# Patient Record
Sex: FEMALE | Race: WHITE | NOT HISPANIC OR LATINO | Employment: UNEMPLOYED | RURAL
[De-identification: names, ages, dates, MRNs, and addresses within clinical notes are randomized per-mention and may not be internally consistent; named-entity substitution may affect disease eponyms.]

---

## 2017-04-18 ENCOUNTER — HISTORICAL (OUTPATIENT)
Dept: ADMINISTRATIVE | Facility: HOSPITAL | Age: 47
End: 2017-04-18

## 2017-04-19 LAB
LAB AP CLINICAL INFORMATION: NORMAL
LAB AP DIAGNOSIS - HISTORICAL: NORMAL
LAB AP GROSS PATHOLOGY - HISTORICAL: NORMAL
LAB AP SPECIMEN SUBMITTED - HISTORICAL: NORMAL

## 2018-12-10 ENCOUNTER — HISTORICAL (OUTPATIENT)
Dept: ADMINISTRATIVE | Facility: HOSPITAL | Age: 48
End: 2018-12-10

## 2018-12-11 LAB
LAB AP CLINICAL INFORMATION: NORMAL
LAB AP DIAGNOSIS - HISTORICAL: NORMAL
LAB AP MICROSCOPIC PATHOLOGY - HISTORICAL: NORMAL
LAB AP SPECIMEN SUBMITTED - HISTORICAL: NORMAL

## 2021-04-08 DIAGNOSIS — R60.9 EDEMA, UNSPECIFIED TYPE: ICD-10-CM

## 2021-04-08 DIAGNOSIS — E87.6 HYPOKALEMIA: ICD-10-CM

## 2021-04-08 DIAGNOSIS — M62.838 MUSCLE SPASM: ICD-10-CM

## 2021-04-08 DIAGNOSIS — T78.40XD ALLERGY, SUBSEQUENT ENCOUNTER: ICD-10-CM

## 2021-04-08 DIAGNOSIS — J31.0 RHINITIS, UNSPECIFIED TYPE: ICD-10-CM

## 2021-04-08 DIAGNOSIS — R11.0 NAUSEA: ICD-10-CM

## 2021-04-08 DIAGNOSIS — R09.81 CONGESTION OF NASAL SINUS: ICD-10-CM

## 2021-04-08 DIAGNOSIS — K21.9 GASTROESOPHAGEAL REFLUX DISEASE, UNSPECIFIED WHETHER ESOPHAGITIS PRESENT: Primary | ICD-10-CM

## 2021-04-08 RX ORDER — FLUTICASONE PROPIONATE 50 MCG
2 SPRAY, SUSPENSION (ML) NASAL DAILY PRN
COMMUNITY
End: 2021-04-08 | Stop reason: SDUPTHER

## 2021-04-08 RX ORDER — CETIRIZINE HYDROCHLORIDE 10 MG/1
10 TABLET ORAL DAILY
Qty: 30 TABLET | Refills: 5 | Status: SHIPPED | OUTPATIENT
Start: 2021-04-08 | End: 2021-10-12 | Stop reason: SDUPTHER

## 2021-04-08 RX ORDER — POTASSIUM CHLORIDE 600 MG/1
8 TABLET, FILM COATED, EXTENDED RELEASE ORAL DAILY
COMMUNITY
End: 2021-04-08 | Stop reason: SDUPTHER

## 2021-04-08 RX ORDER — BACLOFEN 10 MG/1
10 TABLET ORAL 3 TIMES DAILY
COMMUNITY
End: 2021-04-08 | Stop reason: SDUPTHER

## 2021-04-08 RX ORDER — FUROSEMIDE 20 MG/1
20 TABLET ORAL DAILY
COMMUNITY
End: 2021-04-08 | Stop reason: SDUPTHER

## 2021-04-08 RX ORDER — BACLOFEN 10 MG/1
10 TABLET ORAL 3 TIMES DAILY
Qty: 90 TABLET | Refills: 0 | Status: SHIPPED | OUTPATIENT
Start: 2021-04-08 | End: 2021-05-11 | Stop reason: SDUPTHER

## 2021-04-08 RX ORDER — DEXLANSOPRAZOLE 60 MG/1
60 CAPSULE, DELAYED RELEASE ORAL DAILY
COMMUNITY
End: 2021-04-08 | Stop reason: SDUPTHER

## 2021-04-08 RX ORDER — POTASSIUM CHLORIDE 600 MG/1
8 TABLET, FILM COATED, EXTENDED RELEASE ORAL DAILY
Qty: 30 TABLET | Refills: 2 | Status: SHIPPED | OUTPATIENT
Start: 2021-04-08 | End: 2021-07-20 | Stop reason: SDUPTHER

## 2021-04-08 RX ORDER — PROMETHAZINE HYDROCHLORIDE 25 MG/1
25 TABLET ORAL EVERY 8 HOURS PRN
Qty: 30 TABLET | Refills: 0 | Status: SHIPPED | OUTPATIENT
Start: 2021-04-08 | End: 2021-05-11 | Stop reason: SDUPTHER

## 2021-04-08 RX ORDER — FUROSEMIDE 20 MG/1
20 TABLET ORAL DAILY
Qty: 30 TABLET | Refills: 2 | Status: SHIPPED | OUTPATIENT
Start: 2021-04-08 | End: 2021-07-20 | Stop reason: SDUPTHER

## 2021-04-08 RX ORDER — PROMETHAZINE HYDROCHLORIDE 25 MG/1
25 TABLET ORAL EVERY 8 HOURS PRN
COMMUNITY
End: 2021-04-08 | Stop reason: SDUPTHER

## 2021-04-08 RX ORDER — DEXLANSOPRAZOLE 60 MG/1
60 CAPSULE, DELAYED RELEASE ORAL DAILY
Qty: 30 CAPSULE | Refills: 5 | Status: SHIPPED | OUTPATIENT
Start: 2021-04-08 | End: 2021-10-12 | Stop reason: SDUPTHER

## 2021-04-08 RX ORDER — CETIRIZINE HYDROCHLORIDE 10 MG/1
10 TABLET ORAL DAILY
COMMUNITY
End: 2021-04-08 | Stop reason: SDUPTHER

## 2021-04-08 RX ORDER — FLUTICASONE PROPIONATE 50 MCG
2 SPRAY, SUSPENSION (ML) NASAL DAILY PRN
Qty: 16 G | Refills: 5 | Status: SHIPPED | OUTPATIENT
Start: 2021-04-08 | End: 2021-10-12 | Stop reason: SDUPTHER

## 2021-05-06 ENCOUNTER — PATIENT MESSAGE (OUTPATIENT)
Dept: RESEARCH | Facility: HOSPITAL | Age: 51
End: 2021-05-06

## 2021-05-11 DIAGNOSIS — R11.0 NAUSEA: ICD-10-CM

## 2021-05-11 DIAGNOSIS — M62.838 MUSCLE SPASM: ICD-10-CM

## 2021-05-11 RX ORDER — PROMETHAZINE HYDROCHLORIDE 25 MG/1
25 TABLET ORAL EVERY 8 HOURS PRN
Qty: 30 TABLET | Refills: 0 | Status: SHIPPED | OUTPATIENT
Start: 2021-05-11 | End: 2021-06-07 | Stop reason: SDUPTHER

## 2021-05-11 RX ORDER — BACLOFEN 10 MG/1
10 TABLET ORAL 3 TIMES DAILY
Qty: 90 TABLET | Refills: 0 | Status: SHIPPED | OUTPATIENT
Start: 2021-05-11 | End: 2021-06-07 | Stop reason: SDUPTHER

## 2021-06-07 ENCOUNTER — OFFICE VISIT (OUTPATIENT)
Dept: FAMILY MEDICINE | Facility: CLINIC | Age: 51
End: 2021-06-07
Payer: MEDICARE

## 2021-06-07 VITALS
RESPIRATION RATE: 16 BRPM | WEIGHT: 272 LBS | HEIGHT: 69 IN | SYSTOLIC BLOOD PRESSURE: 159 MMHG | HEART RATE: 97 BPM | TEMPERATURE: 99 F | BODY MASS INDEX: 40.29 KG/M2 | DIASTOLIC BLOOD PRESSURE: 89 MMHG | OXYGEN SATURATION: 97 %

## 2021-06-07 DIAGNOSIS — R11.0 NAUSEA: ICD-10-CM

## 2021-06-07 DIAGNOSIS — M62.838 MUSCLE SPASM: ICD-10-CM

## 2021-06-07 DIAGNOSIS — J30.9 ALLERGIC RHINITIS, UNSPECIFIED SEASONALITY, UNSPECIFIED TRIGGER: Primary | ICD-10-CM

## 2021-06-07 PROCEDURE — 99214 OFFICE O/P EST MOD 30 MIN: CPT | Mod: ,,, | Performed by: FAMILY MEDICINE

## 2021-06-07 PROCEDURE — 99214 PR OFFICE/OUTPT VISIT, EST, LEVL IV, 30-39 MIN: ICD-10-PCS | Mod: ,,, | Performed by: FAMILY MEDICINE

## 2021-06-07 RX ORDER — BACLOFEN 10 MG/1
10 TABLET ORAL 3 TIMES DAILY
Qty: 90 TABLET | Refills: 0 | Status: SHIPPED | OUTPATIENT
Start: 2021-06-07 | End: 2021-07-20 | Stop reason: SDUPTHER

## 2021-06-07 RX ORDER — PROMETHAZINE HYDROCHLORIDE 25 MG/1
25 TABLET ORAL EVERY 8 HOURS PRN
Qty: 30 TABLET | Refills: 0 | Status: SHIPPED | OUTPATIENT
Start: 2021-06-07 | End: 2021-08-10 | Stop reason: SDUPTHER

## 2021-07-20 ENCOUNTER — OFFICE VISIT (OUTPATIENT)
Dept: FAMILY MEDICINE | Facility: CLINIC | Age: 51
End: 2021-07-20
Payer: MEDICARE

## 2021-07-20 VITALS
RESPIRATION RATE: 20 BRPM | OXYGEN SATURATION: 96 % | DIASTOLIC BLOOD PRESSURE: 99 MMHG | BODY MASS INDEX: 40.53 KG/M2 | HEIGHT: 69 IN | WEIGHT: 273.63 LBS | SYSTOLIC BLOOD PRESSURE: 152 MMHG | HEART RATE: 101 BPM

## 2021-07-20 DIAGNOSIS — M79.7 FIBROMYALGIA: ICD-10-CM

## 2021-07-20 DIAGNOSIS — R60.9 EDEMA, UNSPECIFIED TYPE: ICD-10-CM

## 2021-07-20 DIAGNOSIS — M54.50 LOW BACK PAIN, UNSPECIFIED BACK PAIN LATERALITY, UNSPECIFIED CHRONICITY, UNSPECIFIED WHETHER SCIATICA PRESENT: Primary | ICD-10-CM

## 2021-07-20 DIAGNOSIS — M62.838 MUSCLE SPASM: ICD-10-CM

## 2021-07-20 DIAGNOSIS — E87.6 HYPOKALEMIA: ICD-10-CM

## 2021-07-20 LAB
CTP QC/QA: YES
POC (AMP) AMPHETAMINE: NEGATIVE
POC (BAR) BARBITURATES: NEGATIVE
POC (BUP) BUPRENORPHINE: NEGATIVE
POC (BZO) BENZODIAZEPINES: NEGATIVE
POC (COC) COCAINE: NEGATIVE
POC (MDMA) METHYLENEDIOXYMETHAMPHETAMINE 3,4: NEGATIVE
POC (MET) METHAMPHETAMINE: NEGATIVE
POC (MOP) OPIATES: NEGATIVE
POC (MTD) METHADONE: NEGATIVE
POC (OXY) OXYCODONE: ABNORMAL
POC (PCP) PHENCYCLIDINE: NEGATIVE
POC (TCA) TRICYCLIC ANTIDEPRESSANTS: NEGATIVE
POC TEMPERATURE (URINE): 93
POC THC: NEGATIVE

## 2021-07-20 PROCEDURE — 99214 OFFICE O/P EST MOD 30 MIN: CPT | Mod: ,,, | Performed by: INTERNAL MEDICINE

## 2021-07-20 PROCEDURE — 80305 DRUG TEST PRSMV DIR OPT OBS: CPT | Mod: RHCUB | Performed by: INTERNAL MEDICINE

## 2021-07-20 PROCEDURE — 99214 PR OFFICE/OUTPT VISIT, EST, LEVL IV, 30-39 MIN: ICD-10-PCS | Mod: ,,, | Performed by: INTERNAL MEDICINE

## 2021-07-20 RX ORDER — POTASSIUM CHLORIDE 600 MG/1
8 TABLET, FILM COATED, EXTENDED RELEASE ORAL DAILY
Qty: 90 TABLET | Refills: 1 | Status: SHIPPED | OUTPATIENT
Start: 2021-07-20 | End: 2022-01-10 | Stop reason: SDUPTHER

## 2021-07-20 RX ORDER — ZIPRASIDONE HYDROCHLORIDE 60 MG/1
1 CAPSULE ORAL DAILY
COMMUNITY
Start: 2021-07-12 | End: 2023-01-05 | Stop reason: SDUPTHER

## 2021-07-20 RX ORDER — RISPERIDONE 2 MG/1
2 TABLET ORAL DAILY
COMMUNITY
Start: 2021-06-11 | End: 2021-10-20

## 2021-07-20 RX ORDER — HYDROXYZINE PAMOATE 50 MG/1
50 CAPSULE ORAL NIGHTLY
COMMUNITY
Start: 2021-06-11 | End: 2022-01-10 | Stop reason: SDUPTHER

## 2021-07-20 RX ORDER — BACLOFEN 10 MG/1
10 TABLET ORAL 3 TIMES DAILY
Qty: 90 TABLET | Refills: 5 | Status: SHIPPED | OUTPATIENT
Start: 2021-07-20 | End: 2022-02-08 | Stop reason: SDUPTHER

## 2021-07-20 RX ORDER — PREGABALIN 150 MG/1
2 CAPSULE ORAL DAILY
COMMUNITY
Start: 2021-07-12 | End: 2023-01-05 | Stop reason: SDUPTHER

## 2021-07-20 RX ORDER — FUROSEMIDE 20 MG/1
20 TABLET ORAL DAILY
Qty: 90 TABLET | Refills: 1 | Status: SHIPPED | OUTPATIENT
Start: 2021-07-20 | End: 2022-01-10 | Stop reason: SDUPTHER

## 2021-08-10 DIAGNOSIS — R11.0 NAUSEA: ICD-10-CM

## 2021-08-10 RX ORDER — PROMETHAZINE HYDROCHLORIDE 25 MG/1
25 TABLET ORAL EVERY 8 HOURS PRN
Qty: 30 TABLET | Refills: 0 | Status: SHIPPED | OUTPATIENT
Start: 2021-08-10 | End: 2021-10-12 | Stop reason: SDUPTHER

## 2021-10-12 DIAGNOSIS — R09.81 CONGESTION OF NASAL SINUS: ICD-10-CM

## 2021-10-12 DIAGNOSIS — T78.40XD ALLERGY, SUBSEQUENT ENCOUNTER: ICD-10-CM

## 2021-10-12 DIAGNOSIS — R11.0 NAUSEA: ICD-10-CM

## 2021-10-12 DIAGNOSIS — K21.9 GASTROESOPHAGEAL REFLUX DISEASE, UNSPECIFIED WHETHER ESOPHAGITIS PRESENT: ICD-10-CM

## 2021-10-12 RX ORDER — OMEPRAZOLE 40 MG/1
40 CAPSULE, DELAYED RELEASE ORAL EVERY MORNING
Qty: 30 CAPSULE | Refills: 5 | Status: SHIPPED | OUTPATIENT
Start: 2021-10-12 | End: 2021-10-20

## 2021-10-12 RX ORDER — DEXLANSOPRAZOLE 60 MG/1
60 CAPSULE, DELAYED RELEASE ORAL DAILY
Qty: 30 CAPSULE | Refills: 0 | Status: CANCELLED | OUTPATIENT
Start: 2021-10-12

## 2021-10-12 RX ORDER — PROMETHAZINE HYDROCHLORIDE 25 MG/1
25 TABLET ORAL EVERY 8 HOURS PRN
Qty: 30 TABLET | Refills: 0 | Status: SHIPPED | OUTPATIENT
Start: 2021-10-12 | End: 2021-11-15 | Stop reason: SDUPTHER

## 2021-10-12 RX ORDER — FLUTICASONE PROPIONATE 50 MCG
2 SPRAY, SUSPENSION (ML) NASAL DAILY PRN
Qty: 16 G | Refills: 2 | Status: SHIPPED | OUTPATIENT
Start: 2021-10-12 | End: 2021-11-15 | Stop reason: SDUPTHER

## 2021-10-12 RX ORDER — CETIRIZINE HYDROCHLORIDE 10 MG/1
10 TABLET ORAL DAILY
Qty: 30 TABLET | Refills: 0 | Status: SHIPPED | OUTPATIENT
Start: 2021-10-12 | End: 2021-11-15 | Stop reason: SDUPTHER

## 2021-10-20 ENCOUNTER — CLINICAL SUPPORT (OUTPATIENT)
Dept: FAMILY MEDICINE | Facility: CLINIC | Age: 51
End: 2021-10-20
Payer: MEDICARE

## 2021-10-20 VITALS
BODY MASS INDEX: 41.47 KG/M2 | OXYGEN SATURATION: 98 % | RESPIRATION RATE: 20 BRPM | HEIGHT: 69 IN | HEART RATE: 98 BPM | WEIGHT: 280 LBS | DIASTOLIC BLOOD PRESSURE: 103 MMHG | SYSTOLIC BLOOD PRESSURE: 159 MMHG

## 2021-10-20 DIAGNOSIS — M79.7 FIBROMYALGIA: Chronic | ICD-10-CM

## 2021-10-20 DIAGNOSIS — R05.9 COUGH: ICD-10-CM

## 2021-10-20 DIAGNOSIS — R07.9 CHEST PAIN, UNSPECIFIED TYPE: Primary | ICD-10-CM

## 2021-10-20 DIAGNOSIS — G89.4 CHRONIC PAIN SYNDROME: Chronic | ICD-10-CM

## 2021-10-20 DIAGNOSIS — R19.7 DIARRHEA, UNSPECIFIED TYPE: ICD-10-CM

## 2021-10-20 DIAGNOSIS — R11.2 NAUSEA AND VOMITING, INTRACTABILITY OF VOMITING NOT SPECIFIED, UNSPECIFIED VOMITING TYPE: ICD-10-CM

## 2021-10-20 DIAGNOSIS — K58.9 IRRITABLE BOWEL SYNDROME, UNSPECIFIED TYPE: Chronic | ICD-10-CM

## 2021-10-20 LAB
ANION GAP SERPL CALCULATED.3IONS-SCNC: 6 MMOL/L (ref 7–16)
BASOPHILS # BLD AUTO: 0.05 K/UL (ref 0–0.2)
BASOPHILS NFR BLD AUTO: 0.7 % (ref 0–1)
BUN SERPL-MCNC: 10 MG/DL (ref 7–18)
BUN/CREAT SERPL: 13 (ref 6–20)
CALCIUM SERPL-MCNC: 9.1 MG/DL (ref 8.5–10.1)
CHLORIDE SERPL-SCNC: 107 MMOL/L (ref 98–107)
CO2 SERPL-SCNC: 31 MMOL/L (ref 21–32)
CREAT SERPL-MCNC: 0.77 MG/DL (ref 0.55–1.02)
DIFFERENTIAL METHOD BLD: ABNORMAL
EOSINOPHIL # BLD AUTO: 0.22 K/UL (ref 0–0.5)
EOSINOPHIL NFR BLD AUTO: 3 % (ref 1–4)
ERYTHROCYTE [DISTWIDTH] IN BLOOD BY AUTOMATED COUNT: 14.4 % (ref 11.5–14.5)
GLUCOSE SERPL-MCNC: 127 MG/DL (ref 74–106)
HCT VFR BLD AUTO: 42.4 % (ref 38–47)
HGB BLD-MCNC: 13.5 G/DL (ref 12–16)
IMM GRANULOCYTES # BLD AUTO: 0.02 K/UL (ref 0–0.04)
IMM GRANULOCYTES NFR BLD: 0.3 % (ref 0–0.4)
LYMPHOCYTES # BLD AUTO: 2.28 K/UL (ref 1–4.8)
LYMPHOCYTES NFR BLD AUTO: 31.3 % (ref 27–41)
MCH RBC QN AUTO: 26.7 PG (ref 27–31)
MCHC RBC AUTO-ENTMCNC: 31.8 G/DL (ref 32–36)
MCV RBC AUTO: 83.8 FL (ref 80–96)
MONOCYTES # BLD AUTO: 0.64 K/UL (ref 0–0.8)
MONOCYTES NFR BLD AUTO: 8.8 % (ref 2–6)
MPC BLD CALC-MCNC: 9.4 FL (ref 9.4–12.4)
NEUTROPHILS # BLD AUTO: 4.08 K/UL (ref 1.8–7.7)
NEUTROPHILS NFR BLD AUTO: 55.9 % (ref 53–65)
NRBC # BLD AUTO: 0 X10E3/UL
NRBC, AUTO (.00): 0 %
PLATELET # BLD AUTO: 357 K/UL (ref 150–400)
POTASSIUM SERPL-SCNC: 3.8 MMOL/L (ref 3.5–5.1)
RBC # BLD AUTO: 5.06 M/UL (ref 4.2–5.4)
SODIUM SERPL-SCNC: 140 MMOL/L (ref 136–145)
WBC # BLD AUTO: 7.29 K/UL (ref 4.5–11)

## 2021-10-20 PROCEDURE — 99214 PR OFFICE/OUTPT VISIT, EST, LEVL IV, 30-39 MIN: ICD-10-PCS | Mod: ,,, | Performed by: INTERNAL MEDICINE

## 2021-10-20 PROCEDURE — 99214 OFFICE O/P EST MOD 30 MIN: CPT | Mod: ,,, | Performed by: INTERNAL MEDICINE

## 2021-10-20 PROCEDURE — 93005 ELECTROCARDIOGRAM TRACING: CPT | Mod: RHCUB | Performed by: INTERNAL MEDICINE

## 2021-10-20 PROCEDURE — 80048 BASIC METABOLIC PNL TOTAL CA: CPT | Mod: ,,, | Performed by: CLINICAL MEDICAL LABORATORY

## 2021-10-20 PROCEDURE — 85025 CBC WITH DIFFERENTIAL: ICD-10-PCS | Mod: ,,, | Performed by: CLINICAL MEDICAL LABORATORY

## 2021-10-20 PROCEDURE — 85025 COMPLETE CBC W/AUTO DIFF WBC: CPT | Mod: ,,, | Performed by: CLINICAL MEDICAL LABORATORY

## 2021-10-20 PROCEDURE — 80048 BASIC METABOLIC PANEL: ICD-10-PCS | Mod: ,,, | Performed by: CLINICAL MEDICAL LABORATORY

## 2021-10-20 RX ORDER — DEXLANSOPRAZOLE 60 MG/1
60 CAPSULE, DELAYED RELEASE ORAL DAILY
COMMUNITY
End: 2021-10-20 | Stop reason: SDUPTHER

## 2021-10-20 RX ORDER — DEXLANSOPRAZOLE 60 MG/1
60 CAPSULE, DELAYED RELEASE ORAL DAILY
Qty: 30 CAPSULE | Refills: 5 | Status: SHIPPED | OUTPATIENT
Start: 2021-10-20 | End: 2022-06-02 | Stop reason: SDUPTHER

## 2021-10-20 RX ORDER — OLANZAPINE 5 MG/1
TABLET ORAL
COMMUNITY
Start: 2021-10-13 | End: 2022-06-02

## 2021-10-20 RX ORDER — LAMOTRIGINE 200 MG/1
TABLET ORAL
COMMUNITY
Start: 2021-10-13

## 2021-10-26 ENCOUNTER — HOSPITAL ENCOUNTER (OUTPATIENT)
Dept: CARDIOLOGY | Facility: HOSPITAL | Age: 51
Discharge: HOME | End: 2021-10-26
Attending: INTERNAL MEDICINE
Payer: MEDICARE

## 2021-10-26 VITALS — BODY MASS INDEX: 41.47 KG/M2 | WEIGHT: 280 LBS | HEIGHT: 69 IN

## 2021-10-26 DIAGNOSIS — R07.9 CHEST PAIN, UNSPECIFIED TYPE: ICD-10-CM

## 2021-10-26 LAB
AORTIC VALVE CUSP SEPERATION: 2.06 CM
AV INDEX (PROSTH): 0.88
AV VALVE AREA: 2.92 CM2
BSA FOR ECHO PROCEDURE: 2.49 M2
CV ECHO LV RWT: 0.47 CM
DOP CALC AO VTI: 24.24 CM
DOP CALC LVOT AREA: 3.3 CM2
DOP CALC LVOT DIAMETER: 2.06 CM
DOP CALC LVOT STROKE VOLUME: 70.76 CM3
DOP CALCLVOT PEAK VEL VTI: 21.24 CM
E WAVE DECELERATION TIME: 192 MSEC
E/A RATIO: 0.98
ECHO LV POSTERIOR WALL: 1 CM (ref 0.6–1.1)
EJECTION FRACTION: 60 %
FRACTIONAL SHORTENING: 53 % (ref 28–44)
INTERVENTRICULAR SEPTUM: 1.04 CM (ref 0.6–1.1)
LEFT ATRIUM SIZE: 3.6 CM
LEFT INTERNAL DIMENSION IN SYSTOLE: 2.03 CM (ref 2.1–4)
LEFT VENTRICLE DIASTOLIC VOLUME INDEX: 28.4 ML/M2
LEFT VENTRICLE DIASTOLIC VOLUME: 67.6 ML
LEFT VENTRICLE MASS INDEX: 61 G/M2
LEFT VENTRICLE SYSTOLIC VOLUME INDEX: 10.9 ML/M2
LEFT VENTRICLE SYSTOLIC VOLUME: 25.9 ML
LEFT VENTRICULAR INTERNAL DIMENSION IN DIASTOLE: 4.28 CM (ref 3.5–6)
LEFT VENTRICULAR MASS: 145.4 G
LVOT MG: 2 MMHG
MV PEAK A VEL: 0.81 M/S
MV PEAK E VEL: 0.79 M/S
RA WIDTH: 3.08 CM
RIGHT VENTRICULAR END-DIASTOLIC DIMENSION: 2.93 CM
RIGHT VENTRICULAR LENGTH IN DIASTOLE (APICAL 4-CHAMBER VIEW): 6.29 CM
RV MID DIAMA: 2.24 CM
TRICUSPID ANNULAR PLANE SYSTOLIC EXCURSION: 2.04 CM

## 2021-10-26 PROCEDURE — 93306 ECHO (CUPID ONLY): ICD-10-PCS | Mod: 26,,, | Performed by: INTERNAL MEDICINE

## 2021-10-26 PROCEDURE — C8929 TTE W OR WO FOL WCON,DOPPLER: HCPCS

## 2021-10-26 PROCEDURE — 93306 TTE W/DOPPLER COMPLETE: CPT | Mod: 26,,, | Performed by: INTERNAL MEDICINE

## 2021-10-26 PROCEDURE — 25500020 PHARM REV CODE 255: Performed by: INTERNAL MEDICINE

## 2021-10-26 RX ADMIN — PERFLUTREN 1.5 ML: 6.52 INJECTION, SUSPENSION INTRAVENOUS at 01:10

## 2021-10-27 ENCOUNTER — APPOINTMENT (OUTPATIENT)
Dept: RADIOLOGY | Facility: CLINIC | Age: 51
End: 2021-10-27
Attending: INTERNAL MEDICINE
Payer: MEDICARE

## 2021-10-27 ENCOUNTER — OFFICE VISIT (OUTPATIENT)
Dept: FAMILY MEDICINE | Facility: CLINIC | Age: 51
End: 2021-10-27
Payer: MEDICARE

## 2021-10-27 VITALS
RESPIRATION RATE: 20 BRPM | HEIGHT: 69 IN | BODY MASS INDEX: 40.34 KG/M2 | OXYGEN SATURATION: 97 % | DIASTOLIC BLOOD PRESSURE: 97 MMHG | HEART RATE: 85 BPM | WEIGHT: 272.38 LBS | SYSTOLIC BLOOD PRESSURE: 137 MMHG

## 2021-10-27 DIAGNOSIS — R10.9 ABDOMINAL PAIN, UNSPECIFIED ABDOMINAL LOCATION: Chronic | ICD-10-CM

## 2021-10-27 DIAGNOSIS — K21.9 GASTROESOPHAGEAL REFLUX DISEASE, UNSPECIFIED WHETHER ESOPHAGITIS PRESENT: Chronic | ICD-10-CM

## 2021-10-27 DIAGNOSIS — Z12.31 ENCOUNTER FOR SCREENING MAMMOGRAM FOR MALIGNANT NEOPLASM OF BREAST: ICD-10-CM

## 2021-10-27 DIAGNOSIS — R11.0 NAUSEA: ICD-10-CM

## 2021-10-27 DIAGNOSIS — Z23 ENCOUNTER FOR IMMUNIZATION: ICD-10-CM

## 2021-10-27 DIAGNOSIS — M17.11 OSTEOARTHRITIS OF RIGHT KNEE, UNSPECIFIED OSTEOARTHRITIS TYPE: Primary | ICD-10-CM

## 2021-10-27 DIAGNOSIS — M17.11 OSTEOARTHRITIS OF RIGHT KNEE, UNSPECIFIED OSTEOARTHRITIS TYPE: ICD-10-CM

## 2021-10-27 PROCEDURE — 90686 IIV4 VACC NO PRSV 0.5 ML IM: CPT | Mod: ,,, | Performed by: INTERNAL MEDICINE

## 2021-10-27 PROCEDURE — 90686 FLU VACCINE (QUAD) GREATER THAN OR EQUAL TO 3YO PRESERVATIVE FREE IM: ICD-10-PCS | Mod: ,,, | Performed by: INTERNAL MEDICINE

## 2021-10-27 PROCEDURE — 73562 X-RAY EXAM OF KNEE 3: CPT | Mod: TC,RHCUB,RT | Performed by: INTERNAL MEDICINE

## 2021-10-27 PROCEDURE — G0008 ADMIN INFLUENZA VIRUS VAC: HCPCS | Mod: ,,, | Performed by: INTERNAL MEDICINE

## 2021-10-27 PROCEDURE — G0008 FLU VACCINE (QUAD) GREATER THAN OR EQUAL TO 3YO PRESERVATIVE FREE IM: ICD-10-PCS | Mod: ,,, | Performed by: INTERNAL MEDICINE

## 2021-10-27 PROCEDURE — 99214 PR OFFICE/OUTPT VISIT, EST, LEVL IV, 30-39 MIN: ICD-10-PCS | Mod: ,,, | Performed by: INTERNAL MEDICINE

## 2021-10-27 PROCEDURE — 99214 OFFICE O/P EST MOD 30 MIN: CPT | Mod: ,,, | Performed by: INTERNAL MEDICINE

## 2021-11-10 ENCOUNTER — OFFICE VISIT (OUTPATIENT)
Dept: GASTROENTEROLOGY | Facility: CLINIC | Age: 51
End: 2021-11-10
Payer: MEDICARE

## 2021-11-10 VITALS
WEIGHT: 270 LBS | DIASTOLIC BLOOD PRESSURE: 112 MMHG | OXYGEN SATURATION: 97 % | SYSTOLIC BLOOD PRESSURE: 172 MMHG | HEART RATE: 95 BPM | HEIGHT: 69 IN | RESPIRATION RATE: 14 BRPM | BODY MASS INDEX: 39.99 KG/M2

## 2021-11-10 DIAGNOSIS — R19.7 DIARRHEA, UNSPECIFIED TYPE: Primary | ICD-10-CM

## 2021-11-10 DIAGNOSIS — R11.14 BILIOUS VOMITING WITH NAUSEA: ICD-10-CM

## 2021-11-10 DIAGNOSIS — R14.0 BLOATING: ICD-10-CM

## 2021-11-10 DIAGNOSIS — K21.9 GASTROESOPHAGEAL REFLUX DISEASE, UNSPECIFIED WHETHER ESOPHAGITIS PRESENT: ICD-10-CM

## 2021-11-10 DIAGNOSIS — R10.9 ABDOMINAL PAIN, UNSPECIFIED ABDOMINAL LOCATION: ICD-10-CM

## 2021-11-10 PROCEDURE — 99204 PR OFFICE/OUTPT VISIT, NEW, LEVL IV, 45-59 MIN: ICD-10-PCS | Mod: ,,, | Performed by: NURSE PRACTITIONER

## 2021-11-10 PROCEDURE — 99204 OFFICE O/P NEW MOD 45 MIN: CPT | Mod: ,,, | Performed by: NURSE PRACTITIONER

## 2021-11-10 RX ORDER — HYDROXYZINE PAMOATE 50 MG/1
CAPSULE ORAL
COMMUNITY
Start: 2021-09-13 | End: 2022-06-02

## 2021-11-15 DIAGNOSIS — R11.0 NAUSEA: ICD-10-CM

## 2021-11-15 DIAGNOSIS — T78.40XD ALLERGY, SUBSEQUENT ENCOUNTER: ICD-10-CM

## 2021-11-15 DIAGNOSIS — R09.81 CONGESTION OF NASAL SINUS: ICD-10-CM

## 2021-11-15 RX ORDER — FLUTICASONE PROPIONATE 50 MCG
2 SPRAY, SUSPENSION (ML) NASAL DAILY PRN
Qty: 16 G | Refills: 2 | Status: SHIPPED | OUTPATIENT
Start: 2021-11-15 | End: 2022-02-08 | Stop reason: SDUPTHER

## 2021-11-15 RX ORDER — PROMETHAZINE HYDROCHLORIDE 25 MG/1
25 TABLET ORAL EVERY 8 HOURS PRN
Qty: 30 TABLET | Refills: 0 | Status: SHIPPED | OUTPATIENT
Start: 2021-11-15 | End: 2022-01-10 | Stop reason: SDUPTHER

## 2021-11-15 RX ORDER — CETIRIZINE HYDROCHLORIDE 10 MG/1
10 TABLET ORAL DAILY
Qty: 30 TABLET | Refills: 2 | Status: SHIPPED | OUTPATIENT
Start: 2021-11-15 | End: 2022-02-08 | Stop reason: SDUPTHER

## 2021-11-17 ENCOUNTER — HOSPITAL ENCOUNTER (OUTPATIENT)
Dept: RADIOLOGY | Facility: HOSPITAL | Age: 51
Discharge: HOME | End: 2021-11-17
Attending: NURSE PRACTITIONER
Payer: MEDICARE

## 2021-11-17 DIAGNOSIS — R14.0 BLOATING: ICD-10-CM

## 2021-11-17 DIAGNOSIS — R11.14 BILIOUS VOMITING WITH NAUSEA: ICD-10-CM

## 2021-11-17 DIAGNOSIS — K21.9 GASTROESOPHAGEAL REFLUX DISEASE, UNSPECIFIED WHETHER ESOPHAGITIS PRESENT: ICD-10-CM

## 2021-11-17 DIAGNOSIS — R10.9 ABDOMINAL PAIN, UNSPECIFIED ABDOMINAL LOCATION: ICD-10-CM

## 2021-11-17 DIAGNOSIS — R19.7 DIARRHEA, UNSPECIFIED TYPE: ICD-10-CM

## 2021-11-17 PROCEDURE — 76700 US ABDOMEN COMPLETE: ICD-10-PCS | Mod: 26,,, | Performed by: RADIOLOGY

## 2021-11-17 PROCEDURE — 76700 US EXAM ABDOM COMPLETE: CPT | Mod: 26,,, | Performed by: RADIOLOGY

## 2021-11-17 PROCEDURE — 76700 US EXAM ABDOM COMPLETE: CPT | Mod: TC

## 2021-11-18 ENCOUNTER — TELEPHONE (OUTPATIENT)
Dept: GASTROENTEROLOGY | Facility: CLINIC | Age: 51
End: 2021-11-18
Payer: MEDICARE

## 2022-01-10 DIAGNOSIS — R60.9 EDEMA, UNSPECIFIED TYPE: ICD-10-CM

## 2022-01-10 DIAGNOSIS — E87.6 HYPOKALEMIA: ICD-10-CM

## 2022-01-10 DIAGNOSIS — R11.0 NAUSEA: ICD-10-CM

## 2022-01-10 NOTE — TELEPHONE ENCOUNTER
----- Message from Lacey Rosas V sent at 1/6/2022 10:44 AM CST -----  REFILLS [phone]     pt says she needs refills on meds phenergan, hydroxyzine, furosemide, and potassium. She says she has nausea all the time and has an appt for feb. With a stomach doctor

## 2022-01-11 RX ORDER — POTASSIUM CHLORIDE 600 MG/1
8 TABLET, FILM COATED, EXTENDED RELEASE ORAL DAILY
Qty: 90 TABLET | Refills: 1 | Status: SHIPPED | OUTPATIENT
Start: 2022-01-11 | End: 2022-03-02 | Stop reason: SDUPTHER

## 2022-01-11 RX ORDER — HYDROXYZINE PAMOATE 50 MG/1
50 CAPSULE ORAL NIGHTLY
Qty: 30 CAPSULE | Refills: 0 | Status: SHIPPED | OUTPATIENT
Start: 2022-01-11 | End: 2022-02-08 | Stop reason: SDUPTHER

## 2022-01-11 RX ORDER — FUROSEMIDE 20 MG/1
20 TABLET ORAL DAILY
Qty: 90 TABLET | Refills: 1 | Status: SHIPPED | OUTPATIENT
Start: 2022-01-11 | End: 2022-06-02 | Stop reason: SDUPTHER

## 2022-01-11 RX ORDER — PROMETHAZINE HYDROCHLORIDE 25 MG/1
25 TABLET ORAL EVERY 8 HOURS PRN
Qty: 30 TABLET | Refills: 0 | Status: SHIPPED | OUTPATIENT
Start: 2022-01-11 | End: 2022-02-08 | Stop reason: SDUPTHER

## 2022-02-08 ENCOUNTER — OFFICE VISIT (OUTPATIENT)
Dept: FAMILY MEDICINE | Facility: CLINIC | Age: 52
End: 2022-02-08
Payer: MEDICARE

## 2022-02-08 DIAGNOSIS — R11.0 NAUSEA: ICD-10-CM

## 2022-02-08 DIAGNOSIS — M62.838 MUSCLE SPASM: ICD-10-CM

## 2022-02-08 DIAGNOSIS — T78.40XD ALLERGY, SUBSEQUENT ENCOUNTER: ICD-10-CM

## 2022-02-08 DIAGNOSIS — Z11.52 ENCOUNTER FOR SCREENING FOR COVID-19: Primary | ICD-10-CM

## 2022-02-08 DIAGNOSIS — R09.81 CONGESTION OF NASAL SINUS: ICD-10-CM

## 2022-02-08 LAB
CTP QC/QA: YES
SARS-COV-2 AG RESP QL IA.RAPID: NEGATIVE

## 2022-02-08 PROCEDURE — 99499 UNLISTED E&M SERVICE: CPT | Mod: ,,, | Performed by: INTERNAL MEDICINE

## 2022-02-08 PROCEDURE — 87426 SARSCOV CORONAVIRUS AG IA: CPT | Mod: RHCUB,CR | Performed by: INTERNAL MEDICINE

## 2022-02-08 PROCEDURE — 99499 NO LOS: ICD-10-PCS | Mod: ,,, | Performed by: INTERNAL MEDICINE

## 2022-02-08 RX ORDER — FLUTICASONE PROPIONATE 50 MCG
2 SPRAY, SUSPENSION (ML) NASAL DAILY PRN
Qty: 16 G | Refills: 0 | Status: SHIPPED | OUTPATIENT
Start: 2022-02-08 | End: 2022-03-02 | Stop reason: SDUPTHER

## 2022-02-08 RX ORDER — BACLOFEN 10 MG/1
10 TABLET ORAL 3 TIMES DAILY
Qty: 90 TABLET | Refills: 0 | Status: SHIPPED | OUTPATIENT
Start: 2022-02-08 | End: 2022-03-02 | Stop reason: SDUPTHER

## 2022-02-08 RX ORDER — HYDROXYZINE PAMOATE 50 MG/1
50 CAPSULE ORAL NIGHTLY
Qty: 30 CAPSULE | Refills: 0 | Status: SHIPPED | OUTPATIENT
Start: 2022-02-08 | End: 2022-03-02 | Stop reason: SDUPTHER

## 2022-02-08 RX ORDER — PROMETHAZINE HYDROCHLORIDE 25 MG/1
25 TABLET ORAL EVERY 8 HOURS PRN
Qty: 30 TABLET | Refills: 0 | Status: SHIPPED | OUTPATIENT
Start: 2022-02-08 | End: 2022-03-02 | Stop reason: SDUPTHER

## 2022-02-08 RX ORDER — CETIRIZINE HYDROCHLORIDE 10 MG/1
10 TABLET ORAL DAILY
Qty: 30 TABLET | Refills: 0 | Status: SHIPPED | OUTPATIENT
Start: 2022-02-08 | End: 2022-03-02 | Stop reason: SDUPTHER

## 2022-02-08 NOTE — PROGRESS NOTES
Did not see physician. Was tested for COVID with negative results and stated will come back in month for office visit.

## 2022-02-08 NOTE — TELEPHONE ENCOUNTER
Informed patient of negative covid results and that dr has refilled meds as requested. Also informed her it will be one month supply and she needs to make a one month follow up. Voiced understanding.

## 2022-02-09 NOTE — TELEPHONE ENCOUNTER
----- Message from Nae Hamilton sent at 2/9/2022  3:10 PM CST -----  Patient called and said one of her medications were not sent in. Patient prefers the pharmacy [facility] in [place].      The missing prescription is Baclofen     [phone]     Informed patient baclofen was received by her pharmacy at 1643 on 2-8-22. Voiced understanding.

## 2022-02-10 ENCOUNTER — TELEPHONE (OUTPATIENT)
Dept: FAMILY MEDICINE | Facility: CLINIC | Age: 52
End: 2022-02-10
Payer: MEDICARE

## 2022-02-10 NOTE — TELEPHONE ENCOUNTER
----- Message from Juany Mayberry sent at 2/10/2022  4:19 PM CST -----  PT SAID HER ESCRIPT ISN'T AT THE PHARMACY..CALL HER BACK AT  P#[phone]        Patient says she spoke with someone at her pharmacy and said they didn't receive her baclofen. Informed patient pharmacy received med script on 2-8-22 at 1643    1650-called, spoke with pharmacy and called in the prescription for baclofen 10mg TID-30 day supply, no refills.    1654-called patient back to inform her med-baclofen was called in. Voiced understanding

## 2022-03-02 ENCOUNTER — OFFICE VISIT (OUTPATIENT)
Dept: FAMILY MEDICINE | Facility: CLINIC | Age: 52
End: 2022-03-02
Payer: MEDICARE

## 2022-03-02 VITALS
OXYGEN SATURATION: 96 % | BODY MASS INDEX: 39.69 KG/M2 | HEART RATE: 87 BPM | TEMPERATURE: 98 F | WEIGHT: 268 LBS | SYSTOLIC BLOOD PRESSURE: 133 MMHG | RESPIRATION RATE: 20 BRPM | HEIGHT: 69 IN | DIASTOLIC BLOOD PRESSURE: 89 MMHG

## 2022-03-02 DIAGNOSIS — E87.6 HYPOKALEMIA: ICD-10-CM

## 2022-03-02 DIAGNOSIS — J30.89 SEASONAL ALLERGIC RHINITIS DUE TO OTHER ALLERGIC TRIGGER: ICD-10-CM

## 2022-03-02 DIAGNOSIS — G47.09 OTHER INSOMNIA: ICD-10-CM

## 2022-03-02 DIAGNOSIS — M62.838 MUSCLE SPASM: Primary | ICD-10-CM

## 2022-03-02 DIAGNOSIS — T78.40XD ALLERGY, SUBSEQUENT ENCOUNTER: ICD-10-CM

## 2022-03-02 DIAGNOSIS — R11.0 NAUSEA: ICD-10-CM

## 2022-03-02 DIAGNOSIS — R09.81 CONGESTION OF NASAL SINUS: ICD-10-CM

## 2022-03-02 PROCEDURE — 99214 OFFICE O/P EST MOD 30 MIN: CPT | Mod: ,,, | Performed by: INTERNAL MEDICINE

## 2022-03-02 PROCEDURE — 99214 PR OFFICE/OUTPT VISIT, EST, LEVL IV, 30-39 MIN: ICD-10-PCS | Mod: ,,, | Performed by: INTERNAL MEDICINE

## 2022-03-02 NOTE — PROGRESS NOTES
Subjective:       Patient ID: Shayy Hunter is a 51 y.o. female.    Chief Complaint: Follow-up, Spasms (C/o of spasms in back, buttocks, and legs. ), Eye Drainage (Lt. Eye - started today), and Medication Refill    Patient is here today for check up evaluation. Patient complains of muscle spasms in her lower back, bottom, and bilateral legs. Patient also complaining of left eye drainage. States feels it is her allergies. Has a slight headache and nose running. Will order Tobradex eye drops, 1 drop in left eye QD x 5 days. Patient states she is having surgery on her right wrist tomorrow for arthritis and tendinitis. Will follow in 2 months.       Current Medications:    Current Outpatient Medications:     baclofen (LIORESAL) 10 MG tablet, Take 1 tablet (10 mg total) by mouth 3 (three) times daily., Disp: 90 tablet, Rfl: 0    cetirizine (ZYRTEC) 10 MG tablet, Take 1 tablet (10 mg total) by mouth once daily., Disp: 30 tablet, Rfl: 0    dexlansoprazole (DEXILANT) 60 mg capsule, Take 1 capsule (60 mg total) by mouth once daily., Disp: 30 capsule, Rfl: 5    fluticasone propionate (FLONASE) 50 mcg/actuation nasal spray, 2 sprays (100 mcg total) by Each Nostril route daily as needed for Rhinitis., Disp: 16 g, Rfl: 0    furosemide (LASIX) 20 MG tablet, Take 1 tablet (20 mg total) by mouth once daily., Disp: 90 tablet, Rfl: 1    hydrOXYzine pamoate (VISTARIL) 50 MG Cap, , Disp: , Rfl:     hydrOXYzine pamoate (VISTARIL) 50 MG Cap, Take 1 capsule (50 mg total) by mouth every evening., Disp: 30 capsule, Rfl: 0    lamoTRIgine (LAMICTAL) 200 MG tablet, , Disp: , Rfl:     OLANZapine (ZYPREXA) 5 MG tablet, , Disp: , Rfl:     oxycodone-acetaminophen  mg (PERCOCET)  mg per tablet, Take 1 tablet by mouth daily as needed., Disp: , Rfl:     potassium chloride (KLOR-CON) 8 MEQ TbSR, Take 1 tablet (8 mEq total) by mouth once daily., Disp: 90 tablet, Rfl: 1    pregabalin (LYRICA) 150 MG capsule, Take 2 capsules by  mouth once daily., Disp: , Rfl:     promethazine (PHENERGAN) 25 MG tablet, Take 1 tablet (25 mg total) by mouth every 8 (eight) hours as needed for Nausea., Disp: 30 tablet, Rfl: 0    topiramate (TOPAMAX) 100 MG tablet, Take 100 mg by mouth every evening., Disp: , Rfl:     ziprasidone (GEODON) 60 MG Cap, Take 1 capsule by mouth once daily., Disp: , Rfl:     Current Facility-Administered Medications:     triamcinolone acetonide injection 80 mg, 80 mg, INTRABURSAL, 1 time in Clinic/HOD, Bertram Gibson MD    Last Labs:     Office Visit on 02/08/2022   Component Date Value    SARS Coronavirus 2 Antig* 02/08/2022 Negative      Acceptab* 02/08/2022 Yes        Last Imaging:  US Abdomen Complete  Narrative: EXAMINATION:  US ABDOMEN COMPLETE    CLINICAL HISTORY:  Diarrhea, unspecified    COMPARISON:  Abdominal ultrasound July 10, 2008    TECHNIQUE:  Multiple longitudinal and transverse real-time sonographic images of the abdomen are obtained.    FINDINGS:  The liver measures 16.9 cm and demonstrates increased echogenicity without focal abnormality on submitted images. Prior cholecystectomy.  The common duct measures 0.85 cm in diameter. There is no evidence of intrahepatic ductal dilatation.    The right and left kidneys measure 11.2 cm and 11.1 cm, respectively. There is no hydronephrosis.    The spleen measures 10.7 cm without focal abnormality.    Evaluation of the pancreas limited secondary to bowel gas.    IVC and aorta: Visualized portions grossly unremarkable.    No evidence of ascites.  Impression: Increased echogenicity of the liver suggestive of steatosis.  Prior cholecystectomy.    Point of Service: [facility]    Electronically signed by: Tyrone Asher  Date:    11/17/2021  Time:    08:54         Review of Systems   Musculoskeletal: Positive for arthralgias and back pain.   All other systems reviewed and are negative.        Objective:      Physical Exam  Vitals reviewed.    Constitutional:       Appearance: Normal appearance.   Cardiovascular:      Rate and Rhythm: Normal rate and regular rhythm.      Pulses: Normal pulses.      Heart sounds: Normal heart sounds.   Pulmonary:      Effort: Pulmonary effort is normal.      Breath sounds: Normal breath sounds.   Abdominal:      General: Abdomen is flat. Bowel sounds are normal.      Palpations: Abdomen is soft.   Musculoskeletal:         General: Normal range of motion.      Cervical back: Normal range of motion and neck supple.   Skin:     General: Skin is warm and dry.   Neurological:      General: No focal deficit present.      Mental Status: She is alert and oriented to person, place, and time. Mental status is at baseline.         Assessment:       1. Other insomnia     2. Muscle spasm     3. Allergy, subsequent encounter     4. Congestion of nasal sinus     5. Hypokalemia     6. Nausea     7. Seasonal allergic rhinitis due to other allergic trigger          Plan:         Shayy was seen today for follow-up, spasms, eye drainage and medication refill.    Diagnoses and all orders for this visit:    Other insomnia    Muscle spasm  The following orders have not been finalized:  -     baclofen (LIORESAL) 10 MG tablet    Allergy, subsequent encounter  The following orders have not been finalized:  -     cetirizine (ZYRTEC) 10 MG tablet    Congestion of nasal sinus  The following orders have not been finalized:  -     fluticasone propionate (FLONASE) 50 mcg/actuation nasal spray    Hypokalemia  The following orders have not been finalized:  -     potassium chloride (KLOR-CON) 8 MEQ TbSR    Nausea  The following orders have not been finalized:  -     promethazine (PHENERGAN) 25 MG tablet    Seasonal allergic rhinitis due to other allergic trigger    Other orders  The following orders have not been finalized:  -     hydrOXYzine pamoate (VISTARIL) 50 MG Cap  -     tobramycin-dexamethasone 0.3-0.1% (TOBRADEX) 0.3-0.1 % DrpS

## 2022-03-03 RX ORDER — POTASSIUM CHLORIDE 600 MG/1
8 TABLET, FILM COATED, EXTENDED RELEASE ORAL DAILY
Qty: 90 TABLET | Refills: 1 | Status: SHIPPED | OUTPATIENT
Start: 2022-03-03 | End: 2022-07-11 | Stop reason: SDUPTHER

## 2022-03-03 RX ORDER — BACLOFEN 10 MG/1
10 TABLET ORAL 3 TIMES DAILY
Qty: 90 TABLET | Refills: 0 | Status: SHIPPED | OUTPATIENT
Start: 2022-03-03 | End: 2022-04-08 | Stop reason: SDUPTHER

## 2022-03-03 RX ORDER — CETIRIZINE HYDROCHLORIDE 10 MG/1
10 TABLET ORAL DAILY
Qty: 30 TABLET | Refills: 0 | Status: SHIPPED | OUTPATIENT
Start: 2022-03-03 | End: 2022-04-08 | Stop reason: SDUPTHER

## 2022-03-03 RX ORDER — BACLOFEN 10 MG/1
10 TABLET ORAL 3 TIMES DAILY
Qty: 90 TABLET | Refills: 0 | Status: SHIPPED | OUTPATIENT
Start: 2022-03-03 | End: 2022-03-03 | Stop reason: SDUPTHER

## 2022-03-03 RX ORDER — TOBRAMYCIN AND DEXAMETHASONE 3; 1 MG/ML; MG/ML
1 SUSPENSION/ DROPS OPHTHALMIC DAILY
Qty: 2.5 ML | Refills: 0 | Status: SHIPPED | OUTPATIENT
Start: 2022-03-03 | End: 2022-03-08

## 2022-03-03 RX ORDER — PROMETHAZINE HYDROCHLORIDE 25 MG/1
25 TABLET ORAL EVERY 8 HOURS PRN
Qty: 30 TABLET | Refills: 0 | Status: SHIPPED | OUTPATIENT
Start: 2022-03-03 | End: 2022-04-08 | Stop reason: SDUPTHER

## 2022-03-03 RX ORDER — HYDROXYZINE PAMOATE 50 MG/1
50 CAPSULE ORAL NIGHTLY
Qty: 30 CAPSULE | Refills: 0 | Status: SHIPPED | OUTPATIENT
Start: 2022-03-03 | End: 2022-06-02

## 2022-03-03 RX ORDER — FLUTICASONE PROPIONATE 50 MCG
2 SPRAY, SUSPENSION (ML) NASAL DAILY PRN
Qty: 16 G | Refills: 0 | Status: SHIPPED | OUTPATIENT
Start: 2022-03-03 | End: 2022-05-10 | Stop reason: SDUPTHER

## 2022-03-03 NOTE — PATIENT INSTRUCTIONS
Shayy was seen today for follow-up, spasms, eye drainage and medication refill.    Diagnoses and all orders for this visit:    Other insomnia    Muscle spasm  The following orders have not been finalized:  -     baclofen (LIORESAL) 10 MG tablet    Allergy, subsequent encounter  The following orders have not been finalized:  -     cetirizine (ZYRTEC) 10 MG tablet    Congestion of nasal sinus  The following orders have not been finalized:  -     fluticasone propionate (FLONASE) 50 mcg/actuation nasal spray    Hypokalemia  The following orders have not been finalized:  -     potassium chloride (KLOR-CON) 8 MEQ TbSR    Nausea  The following orders have not been finalized:  -     promethazine (PHENERGAN) 25 MG tablet    Seasonal allergic rhinitis due to other allergic trigger    Other orders  The following orders have not been finalized:  -     hydrOXYzine pamoate (VISTARIL) 50 MG Cap  -     tobramycin-dexamethasone 0.3-0.1% (TOBRADEX) 0.3-0.1 % pS

## 2022-03-11 DIAGNOSIS — Z71.89 COMPLEX CARE COORDINATION: ICD-10-CM

## 2022-04-08 DIAGNOSIS — T78.40XD ALLERGY, SUBSEQUENT ENCOUNTER: ICD-10-CM

## 2022-04-08 DIAGNOSIS — R11.0 NAUSEA: ICD-10-CM

## 2022-04-08 DIAGNOSIS — M62.838 MUSCLE SPASM: ICD-10-CM

## 2022-04-08 RX ORDER — PROMETHAZINE HYDROCHLORIDE 25 MG/1
25 TABLET ORAL EVERY 8 HOURS PRN
Qty: 30 TABLET | Refills: 0 | Status: SHIPPED | OUTPATIENT
Start: 2022-04-08 | End: 2022-05-10 | Stop reason: SDUPTHER

## 2022-04-08 RX ORDER — BACLOFEN 10 MG/1
10 TABLET ORAL 3 TIMES DAILY
Qty: 90 TABLET | Refills: 0 | Status: SHIPPED | OUTPATIENT
Start: 2022-04-08 | End: 2022-05-10 | Stop reason: SDUPTHER

## 2022-04-08 RX ORDER — CETIRIZINE HYDROCHLORIDE 10 MG/1
10 TABLET ORAL DAILY
Qty: 30 TABLET | Refills: 0 | Status: SHIPPED | OUTPATIENT
Start: 2022-04-08 | End: 2022-05-10 | Stop reason: SDUPTHER

## 2022-04-08 NOTE — TELEPHONE ENCOUNTER
----- Message from Devaughn Humphrey sent at 4/7/2022  2:40 PM CDT -----  Refill on baclofen (LIORESAL) 10 MG tablet,promethazine (PHENERGAN) 25 MG tablet,furosemide (LASIX) 20 MG tablet,potassium chloride (KLOR-CON) 8 MEQ TbSR and cetirizine (ZYRTEC) 10 MG tablet. [facility] - [place] - [address] [facility]     Spoke with patient about refills...  Informed her that she has refill on lasix and potassium-to check with her pharmacy. Zyrtec, phenergan, baclofen (not enough sent-pt takes med TID) can be sent to doctor to send to pharmacy. Voiced understanding

## 2022-04-15 DIAGNOSIS — Z98.890 STATUS POST DE QUERVAIN'S RELEASE SURGERY: Primary | ICD-10-CM

## 2022-04-18 ENCOUNTER — CLINICAL SUPPORT (OUTPATIENT)
Dept: REHABILITATION | Facility: HOSPITAL | Age: 52
End: 2022-04-18
Payer: MEDICARE

## 2022-04-18 DIAGNOSIS — M25.631 DECREASED RANGE OF MOTION OF RIGHT WRIST: ICD-10-CM

## 2022-04-18 DIAGNOSIS — R27.8 DECREASED COORDINATION: ICD-10-CM

## 2022-04-18 DIAGNOSIS — M25.531 PAIN IN RIGHT WRIST: ICD-10-CM

## 2022-04-18 DIAGNOSIS — Z98.890 STATUS POST DE QUERVAIN'S RELEASE SURGERY: ICD-10-CM

## 2022-04-18 DIAGNOSIS — R29.898 RIGHT HAND WEAKNESS: ICD-10-CM

## 2022-04-18 PROCEDURE — 97165 OT EVAL LOW COMPLEX 30 MIN: CPT

## 2022-04-18 NOTE — PLAN OF CARE
Patient: Shayy Hunter  Date of Evaluation: 04/18/2022  Referring Physician: Syd Piña MD  Diagnosis:   Encounter Diagnoses   Name Primary?    Status post de Quervain's release surgery     Right hand weakness     Decreased coordination     Decreased range of motion of right wrist     Pain in right wrist        Referral Orders: Eval and treat    Start Time: 1100  End Time: 1124  Total Time: 24 min  Group Time: 0    Age: 51 y.o.  Sex: female  Hand dominance: right    Occupation: Disabled  Working presently: No  Last time worked: N/A  Workmen's Compensation: No    Date of Injury: September 2021  Date of Surgery: 3/30/22  Involved areas: right wrist    Past Medical History:   Diagnosis Date    Depression 7/31/2012    GERD (gastroesophageal reflux disease)     Herniation of intervertebral disc between L4 and L5     L4, L5, S1    LBP (low back pain) 7/31/2012    Sciatica      Current Outpatient Medications   Medication Sig    baclofen (LIORESAL) 10 MG tablet Take 1 tablet (10 mg total) by mouth 3 (three) times daily.    cetirizine (ZYRTEC) 10 MG tablet Take 1 tablet (10 mg total) by mouth once daily.    dexlansoprazole (DEXILANT) 60 mg capsule Take 1 capsule (60 mg total) by mouth once daily.    fluticasone propionate (FLONASE) 50 mcg/actuation nasal spray 2 sprays (100 mcg total) by Each Nostril route daily as needed for Rhinitis.    furosemide (LASIX) 20 MG tablet Take 1 tablet (20 mg total) by mouth once daily.    hydrOXYzine pamoate (VISTARIL) 50 MG Cap     hydrOXYzine pamoate (VISTARIL) 50 MG Cap Take 1 capsule (50 mg total) by mouth every evening.    lamoTRIgine (LAMICTAL) 200 MG tablet     OLANZapine (ZYPREXA) 5 MG tablet     oxycodone-acetaminophen  mg (PERCOCET)  mg per tablet Take 1 tablet by mouth daily as needed.    potassium chloride (KLOR-CON) 8 MEQ TbSR Take 1 tablet (8 mEq total) by mouth once daily.    pregabalin (LYRICA) 150 MG capsule Take 2 capsules by mouth once  "daily.    promethazine (PHENERGAN) 25 MG tablet Take 1 tablet (25 mg total) by mouth every 8 (eight) hours as needed for Nausea.    topiramate (TOPAMAX) 100 MG tablet Take 100 mg by mouth every evening.    ziprasidone (GEODON) 60 MG Cap Take 1 capsule by mouth once daily.     Current Facility-Administered Medications   Medication    triamcinolone acetonide injection 80 mg     Review of patient's allergies indicates:   Allergen Reactions    Definity [perflutren lipid microspheres] Shortness Of Breath, Nausea Only and Rash     Red faced, short of breath and nauseated.     Amoxicillin Rash     X-Ray Results: See chart.   MRI Results: See chart.   EMG Results: See chart.       Subjective  Shayy reports "I've just been hurting for a while, and it got to the point that I couldn't stand it so I had surgery. It's finally starting to feel some better now."     Functional Pain Scale Rating 0-10: 8/10 with movement and functional use of dominant R hand  Location: R wrist  Description: Aching and Throbbing  Activities which increase pain: Squeezing, grasping, pinching, forearm/wrist mobility.   Activities which decrease pain: rest    Shayy's goals for therapy are: Decrease pain, Improve ROM, Improve strenght, Improve , Improve pinch and Improve functional hand use      Objective    Observation: Skin intact and Hypersensitive scarring    Sensation: Intact R hand    Wound Assessment: No wound present, scar healed at surgical site.     Range of Motion: Right Active  1st Digit: WFL  2nd Digit: WFL  3rd Digit: WFL  4th Digit: WFL  Thumb IP Flexion/Extension: WFL  Thumb MC Flexion/Extension: 10*/Neutral  Thumb Opposition: Limited, 5 cm from full contact of palm for full ROM opposition  Palmar Abduction: Limited, contact-30*  Radial Abduction: Limited, contact-35*    Wrist Ext/Flex: 35*/50*  Wrist RD/UD: Neutral/15*  Supination/Pronation: WFL/WFL  Elbow extension/flexion: WFL/WFL     Strength: (MARY KATE Dynamometer in " lbs.) Average 3 trials, Position II  Right: 0#  Left: 45#    Pinch Strength: (Pinch Gauge in lbs), Average 3 trials  Portillo Pinch R) 4#    L) 9#  3pt Pinch   R) 4#   L) 10#  2pt Pinch   R) 3#   L) 10#    Functional Limitations: Patient presents with the following functional Limitations:   Self Care / ADL: Dressing, Bathing, Grooming, Hygiene, Tying shoes and Buttons/Fastners    Work/Activities: Lifting, Carrying, Handwriting, Grasping, Pinching, Squeezing    Leisure: Crafts    Treatment included: OT evaluation and instruction in HEP including (Hand Therapy/Finger Exercises) Radial Deviation, Ulnar Deviation, Thumb Flexion, Thumb Palmar AB, Thumb Radial AB, Wrist flexion/extension Active, Opposition    Repetitions 1 set x 10 reps each   Frequency 1 per day      Assessment  Treatment Diagnosis/ Problem List RUE Decreased ROM, Decreased  strength, Decreased pinch strength, Decreased muscle strength, Decreased functional hand use, Increased pain and Diminished/Impaired Coordination    Short Term Goals: Patient to be IND with HEP and modalities for pain/edema managment., Increase ROM 3-5 degrees to increase functional hand use for ADLs/work/leisure activities., Increase  strength 3-5 lbs. to improve functional grasp for ADLs/work/leisure activities. , Increase pinch 1-3 psi's to increase IND wiht button and FM Coordination. and Decrease complaints of pain to  4 out of 10 in R wrist/hand to increase functional hand use for ADL/work/leisure activities.    Long Term Goals: Patient will perform ADLs and IADLs including fasteners, opening containers/bottles/jars Tammy with dominant R hand with pain rated 2/10 or less.       Plan  Shayy Hunter to be treated by Occupational Therapy 2 times per week for 6 weeks during the certification period from 4/18/22 to 5/30/22 to achieve the established goals.     Treatment to include: Modalities for pain management, Therapeutic exercises/activities. and Strengthening, as well as any  other treatments deemed necessary based on the patient's needs or progress.     I certify the need for these services furnished under this plan of treatment and while under my care.     ____________________________________                         __________________  Physician/Referring Practitioner                                               Date of Signature

## 2022-04-20 ENCOUNTER — CLINICAL SUPPORT (OUTPATIENT)
Dept: REHABILITATION | Facility: HOSPITAL | Age: 52
End: 2022-04-20
Payer: MEDICARE

## 2022-04-20 DIAGNOSIS — R27.8 DECREASED COORDINATION: ICD-10-CM

## 2022-04-20 DIAGNOSIS — M25.531 PAIN IN RIGHT WRIST: ICD-10-CM

## 2022-04-20 DIAGNOSIS — R29.898 RIGHT HAND WEAKNESS: Primary | ICD-10-CM

## 2022-04-20 DIAGNOSIS — M25.631 DECREASED RANGE OF MOTION OF RIGHT WRIST: ICD-10-CM

## 2022-04-20 PROCEDURE — 97110 THERAPEUTIC EXERCISES: CPT

## 2022-04-20 NOTE — PROGRESS NOTES
Patient:  Shayy Hunter  Clinic #:  [id number]   Date of Note: 04/20/2022   Referring Physician:  Syd Olmos MD  Diagnosis:    Encounter Diagnoses   Name Primary?    Right hand weakness Yes    Decreased coordination     Decreased range of motion of right wrist     Pain in right wrist         Start Time: 1107  End Time: 1157  1157Total Time: 50 mins  Visit #: 2/13      Subjective:   Pt reported significant anxiety on this day, unable to tolerate sitting in therapy gym, with OTR offering private treatment room to ease anxiety. Per pt report, pt becomes increasingly anxious with other people around/increased noise level. Pt agreeable to participation in private treatment room. Pt reported pain in R wrist 4/10.     Pain: 4 out of 10     Objective:   Patient seen by OT this session. Treatment  consist of the following:  Therapeutic exercises    Treatment: Therapeutic exercises x 50 min  - R wrist flexion AROM 3 sets x 10 reps, pain rated 4/10.   - R wrist extension AROM 2 sets x 10 reps, pain rated 5/10  - R  strengthening with yellow putty, gross grasp 1 set x 10 reps, pain rated 4/10.   - R pinch strengthening with yellow putty, pincer & tripod, 1 set x 10 reps, pain rated 5/10.   - Ice to R wrist x 10 mins at end of therex due to reported pain/discomfort rated 4-5/10.      Assessment:  Pt will continue to benefit from skilled OT intervention.    Patient continues to demonstrate limitation  with  ROM, Joint mobility, Stiffness, Decreased fine motor coordination, Decreased gross motor coordination, Decreased functional use, Decreased strength and Continued pain. Pt limited significantly this treatment session by burning discomfort at site of surgery R wrist. Pt reported having Fibromyalgia, potentially contributing to level of burning discomfort. Reported decreased discomfort with use of ice. Educated pt on home ice use, verbalizing good understanding.       New/Revised Goals: Continue POC  1.   2.   3.   4.         Plan:  Continue 2x week x 6 weeks  during the certification period from  4/18/22 to 5/30/22  in pursuit of  OT goals.      Gena Banerjee, OTKARISSA, OTR/L  4/20/2022

## 2022-04-26 ENCOUNTER — CLINICAL SUPPORT (OUTPATIENT)
Dept: REHABILITATION | Facility: HOSPITAL | Age: 52
End: 2022-04-26
Payer: MEDICARE

## 2022-04-26 DIAGNOSIS — R29.898 RIGHT HAND WEAKNESS: Primary | ICD-10-CM

## 2022-04-26 DIAGNOSIS — M25.631 DECREASED RANGE OF MOTION OF RIGHT WRIST: ICD-10-CM

## 2022-04-26 DIAGNOSIS — M25.531 PAIN IN RIGHT WRIST: ICD-10-CM

## 2022-04-26 DIAGNOSIS — R27.8 DECREASED COORDINATION: ICD-10-CM

## 2022-04-26 PROCEDURE — 97110 THERAPEUTIC EXERCISES: CPT

## 2022-04-26 NOTE — PROGRESS NOTES
"Patient:  Shayy Hunter  Clinic #:  [id number]   Date of Note: 04/26/2022   Referring Physician:  Syd Olmos MD  Diagnosis:    Encounter Diagnoses   Name Primary?    Right hand weakness Yes    Decreased coordination     Decreased range of motion of right wrist     Pain in right wrist         Start Time: 1104  End Time: 1155  1157Total Time: 51 mins  Visit #: 3/13      Subjective:   Pt reported "it's feeling a little better. I was hurting pretty good over the weekend, but it's better now." Reports pain 3/10 L wrist/thumb. Pt reports scar has "a little bump" with pt reporting sensitivity to touch. Described sensation as burning/uncomfortable.     Pain: 3 out of 10     Objective:   Patient seen by OT this session. Treatment  consist of the following:  Therapeutic exercises    Treatment: Therapeutic exercises x 50 min  - R wrist flexion AROM 3 sets x 10 reps, pain rated 3/10.   - R wrist extension AROM 3 sets x 10 reps, pain rated 4/10  - R  strengthening with green putty, gross grasp with pincer grasp x 5 mins, pain rated 4/10.   - R pinch strengthening with green putty, tripod, 1 set x 10 reps, pain rated 5/10.   - Provided extensive education in pathology of DeQuervain's Tendonitis, surgical repair, and possible neurological effects associated with nerve healing including sensitivity to surgical site. Also educated extensively in need for scar desensitization within pt's realm of comfort due to h/o high anxiety as well as fibromyalgia, further complicating healing/pain management.   - Educated also in HEP including addition of pinching with theraputty at home, scar desensitization and scar massage to pt's comfort level.       Assessment:  Pt will continue to benefit from skilled OT intervention.    Patient continues to demonstrate limitation  with  ROM, Joint mobility, Stiffness, Decreased fine motor coordination, Decreased gross motor coordination, Decreased functional use, Decreased strength and " Continued pain. Pt presents with significant improvement in ROM and strength of R wrist and grasp/pinch. Pt reported less discomfort this tx day in comparison to previous tx. Demonstrated good carryover with HEP from previous session, evident in improved ROM and strength as well as comfort level with movement of R hand/wrist. Pt continues to report discomfort and sensitivity on surgical site, describing pain as burning. Provided limited scar desensitization, educating pt in how to perform own desensitization to her tolerance at home. Demonstrated good carryover. Pt has high anxiety and fibromyalgia, benefiting most from self-led desensitization. Addition of pinching exercise with yellow theraputty at home. Good understanding demonstrated.       New/Revised Goals: Continue POC  1.   2.   3.   4.        Plan:  Continue 2x week x 6 weeks  during the certification period from  4/18/22 to 5/30/22  in pursuit of  OT goals.      FELICIA Taylor, OTR/L  4/26/2022

## 2022-04-28 ENCOUNTER — CLINICAL SUPPORT (OUTPATIENT)
Dept: REHABILITATION | Facility: HOSPITAL | Age: 52
End: 2022-04-28
Payer: MEDICARE

## 2022-04-28 DIAGNOSIS — M25.631 DECREASED RANGE OF MOTION OF RIGHT WRIST: ICD-10-CM

## 2022-04-28 DIAGNOSIS — M25.531 PAIN IN RIGHT WRIST: ICD-10-CM

## 2022-04-28 DIAGNOSIS — R29.898 RIGHT HAND WEAKNESS: Primary | ICD-10-CM

## 2022-04-28 DIAGNOSIS — R27.8 DECREASED COORDINATION: ICD-10-CM

## 2022-04-28 PROCEDURE — 97110 THERAPEUTIC EXERCISES: CPT

## 2022-04-28 PROCEDURE — 97124 MASSAGE THERAPY: CPT

## 2022-04-28 NOTE — PROGRESS NOTES
"Patient:  Shayy Hunter  [facility] #:  [id number]   Date of Note: 04/28/2022   Referring Physician:  Syd Olmos MD  Diagnosis:    Encounter Diagnoses   Name Primary?    Right hand weakness Yes    Decreased coordination     Decreased range of motion of right wrist     Pain in right wrist         Start Time: 1020  End Time: 1100  1157Total Time: 40 mins  Visit #: 4/13      Subjective:   Pt reported "it's feeling better and stronger but I'm still having a little hump on the scar." Reports pain 3/10 L wrist/thumb. Described sensation as burning/uncomfortable.     Pain: 3 out of 10     Objective:   Patient seen by OT this session. Treatment  consist of the following: Massage and Therapeutic exercises     Treatment:   Massage  - Performed retrograde massage of R wrist, hand, and forearm due to slight edema at surgical site. Tolerated volar/dorsal massage, avoiding massage of actual surgical site due to significant discomfort/sensitivity. With massage, edema decreased significantly.     Therapeutic exercises   - R wrist flexion AROM 3 sets x 10 reps, pain rated 3/10.   - R wrist extension AROM 1 set x 10 reps, pain rated 5/10  - Ice application at end x 10 minutes, tolerating well with pain 5/10.   - Educated also in HEP including limiting pinching/gripping with theraputty at home, continue scar desensitization and scar massage to pt's comfort level as well as ice application for edema.       Assessment:  Pt will continue to benefit from skilled OT intervention.    Patient continues to demonstrate limitation  with  ROM, Joint mobility, Stiffness, Decreased fine motor coordination, Decreased gross motor coordination, Decreased functional use, Decreased strength and Continued pain. Pt presents with significant improvement in ROM and strength of R wrist and grasp/pinch. Demonstrated good carryover with HEP from previous session, evident in improved ROM and strength as well as comfort level with movement of R hand/wrist. " Pt continues to report discomfort and sensitivity on surgical site, describing pain as burning. Provided limited scar desensitization, educating pt in how to perform own desensitization to her tolerance at home. Demonstrated good carryover.    New/Revised Goals: Continue POC  1.   2.   3.   4.        Plan:  Continue 2x week x 6 weeks  during the certification period from  4/18/22 to 5/30/22  in pursuit of  OT goals.      Gena Banerjee, FELICIA, OTR/L  4/28/2022

## 2022-05-06 ENCOUNTER — CLINICAL SUPPORT (OUTPATIENT)
Dept: REHABILITATION | Facility: HOSPITAL | Age: 52
End: 2022-05-06
Payer: MEDICARE

## 2022-05-06 DIAGNOSIS — M25.631 DECREASED RANGE OF MOTION OF RIGHT WRIST: ICD-10-CM

## 2022-05-06 DIAGNOSIS — M25.531 PAIN IN RIGHT WRIST: ICD-10-CM

## 2022-05-06 DIAGNOSIS — R27.8 DECREASED COORDINATION: ICD-10-CM

## 2022-05-06 DIAGNOSIS — R29.898 RIGHT HAND WEAKNESS: Primary | ICD-10-CM

## 2022-05-06 PROCEDURE — 97110 THERAPEUTIC EXERCISES: CPT

## 2022-05-06 NOTE — PROGRESS NOTES
"Patient:  Shayy Hunter  Clinic #:  [id number]   Date of Note: 05/06/2022   Referring Physician:  Syd Olmos MD  Diagnosis:    Encounter Diagnoses   Name Primary?    Right hand weakness Yes    Decreased coordination     Decreased range of motion of right wrist     Pain in right wrist         Start Time: 1100  End Time: 1139  1157Total Time: 39 mins  Visit #: 5/13      Subjective:   Pt reported "I haven't been feeling too good, so I haven't really focused much on my hand." Pt reports having GI illness resulting in need to cancel previous OT appt this week. Pt willing to participate but "take it slow."     Pain: 4 out of 10     Objective:   Patient seen by OT this session. Treatment  consist of the following: Therapeutic exercises     Treatment:     Therapeutic exercises   - R wrist flexion with 1# weight, 3 sets x 12 reps, pain rated 4/10.   - R wrist extension with 1# weight, 3 sets x 12 reps, pain rated 4/10.  - R supination/pronation AROM, 3 sets x 12 reps, pain rated 4/10.  - R  strengthening with 3# digiflex, 3 sets x 12 reps, pain rated 4/10.   - R tripod pinch strengthening with 1.5# resistive clothespin, 3 sets x 12 reps, pain rated 3/10.     Assessment:  Pt will continue to benefit from skilled OT intervention.    Patient continues to demonstrate limitation  with  ROM, Joint mobility, Stiffness, Decreased fine motor coordination, Decreased gross motor coordination, Decreased functional use, Decreased strength and Continued pain. Pt presents with significant improvement in ROM and strength of R wrist and grasp/pinch. Demonstrated good carryover with HEP from previous session, evident in improved ROM and strength as well as comfort level with movement of R hand/wrist. Tolerated increased strength resistance on this day, greatest discomfort with supination/pronation, unable to tolerate weighted resistance of that motion. Good performance noted. Continue HEP with theraputty.     New/Revised Goals: " Continue POC  1.   2.   3.   4.        Plan:  Continue 2x week x 6 weeks  during the certification period from  4/18/22 to 5/30/22  in pursuit of  OT goals.      FELICIA Taylor, OTR/L  5/6/2022

## 2022-05-10 DIAGNOSIS — R11.0 NAUSEA: ICD-10-CM

## 2022-05-10 DIAGNOSIS — M62.838 MUSCLE SPASM: ICD-10-CM

## 2022-05-10 DIAGNOSIS — R09.81 CONGESTION OF NASAL SINUS: ICD-10-CM

## 2022-05-10 DIAGNOSIS — T78.40XD ALLERGY, SUBSEQUENT ENCOUNTER: ICD-10-CM

## 2022-05-11 ENCOUNTER — CLINICAL SUPPORT (OUTPATIENT)
Dept: REHABILITATION | Facility: HOSPITAL | Age: 52
End: 2022-05-11
Payer: MEDICARE

## 2022-05-11 DIAGNOSIS — M25.531 PAIN IN RIGHT WRIST: ICD-10-CM

## 2022-05-11 DIAGNOSIS — R29.898 RIGHT HAND WEAKNESS: Primary | ICD-10-CM

## 2022-05-11 DIAGNOSIS — M25.631 DECREASED RANGE OF MOTION OF RIGHT WRIST: ICD-10-CM

## 2022-05-11 DIAGNOSIS — R27.8 DECREASED COORDINATION: ICD-10-CM

## 2022-05-11 PROCEDURE — 97110 THERAPEUTIC EXERCISES: CPT

## 2022-05-11 NOTE — PROGRESS NOTES
"Patient:  Shayy Hunter  [facility] #:  [id number]   Date of Note: 05/11/2022   Referring Physician:  Syd Olmos MD  Diagnosis:    No diagnosis found.     Start Time: 1315  End Time: 1402  Total Time: 47 mins  Visit #: 6/13      Subjective:   Pt reported "I'm feeling much better. My thumb and wrist aren't nearly as painful." Pt also reports trying to use R hand at home more with improved discomfort. Reports exercises are going well at home. No increase pain, noted improved function.      Pain: 3 out of 10     Objective:   Patient seen by OT this session. Treatment  consist of the following: Therapeutic exercises     Treatment:     Therapeutic exercises   - R wrist flexion with 1# weight, 3 sets x 12 reps, pain rated 3/10.   - R wrist extension with 1# weight, 3 sets x 12 reps, pain rated 6/10 initially, improving to 4/10.  - R wrist ulnar/radial deviation AROM, 3 sets x 10 reps, pain rated 6/10.   - R supination/pronation AROM, 2 sets x 12 reps, pain rated 3/10.  - R  strengthening with 3# digiflex, 2 sets x 12 reps, pain rated 5/10.   - R tripod pinch strengthening with 1.5# resistive clothespin, 3 sets x 12 reps, pain rated 0/10.  - R wrist ice application x 5 mins, pain 5/10 at end of tx.      Assessment:  Pt will continue to benefit from skilled OT intervention.  Patient continues to demonstrate limitation  with  ROM, Joint mobility, Stiffness, Decreased fine motor coordination, Decreased gross motor coordination, Decreased functional use, Decreased strength and Continued pain. Pt presents with significant improvement in ROM and strength of R wrist and grasp/pinch. Demonstrated good carryover with HEP from previous session, evident in improved ROM and strength as well as comfort level with movement of R hand/wrist. Tolerated increased strength resistance on this day, greatest discomfort with supination/pronation and ulnar/radial deviation. Pain 5/10 at end of tx after ice application. Pt reports "it's sore " "but it's good." Good performance noted. Continue HEP with theraputty.     New/Revised Goals: Continue POC  1.   2.   3.   4.        Plan:  Continue 2x week x 6 weeks  during the certification period from  4/18/22 to 5/30/22  in pursuit of  OT goals.      FELICIA Taylor, OTR/L  5/11/2022    "

## 2022-05-11 NOTE — TELEPHONE ENCOUNTER
----- Message from Nae Hamilton sent at 5/11/2022 12:25 PM CDT -----  PT called requesting a refill Cetirizine, baclofen, FLONASE, promethazine    [facility]   [phone]

## 2022-05-12 RX ORDER — BACLOFEN 10 MG/1
10 TABLET ORAL 3 TIMES DAILY
Qty: 90 TABLET | Refills: 0 | Status: SHIPPED | OUTPATIENT
Start: 2022-05-12 | End: 2022-06-02 | Stop reason: SDUPTHER

## 2022-05-12 RX ORDER — PROMETHAZINE HYDROCHLORIDE 25 MG/1
25 TABLET ORAL EVERY 8 HOURS PRN
Qty: 30 TABLET | Refills: 0 | Status: SHIPPED | OUTPATIENT
Start: 2022-05-12 | End: 2022-06-02 | Stop reason: SDUPTHER

## 2022-05-12 RX ORDER — FLUTICASONE PROPIONATE 50 MCG
2 SPRAY, SUSPENSION (ML) NASAL DAILY PRN
Qty: 16 G | Refills: 0 | Status: SHIPPED | OUTPATIENT
Start: 2022-05-12 | End: 2022-07-11 | Stop reason: SDUPTHER

## 2022-05-12 RX ORDER — CETIRIZINE HYDROCHLORIDE 10 MG/1
10 TABLET ORAL DAILY
Qty: 30 TABLET | Refills: 0 | Status: SHIPPED | OUTPATIENT
Start: 2022-05-12 | End: 2022-06-02 | Stop reason: SDUPTHER

## 2022-05-13 ENCOUNTER — CLINICAL SUPPORT (OUTPATIENT)
Dept: REHABILITATION | Facility: HOSPITAL | Age: 52
End: 2022-05-13
Payer: MEDICARE

## 2022-05-13 DIAGNOSIS — M25.631 DECREASED RANGE OF MOTION OF RIGHT WRIST: ICD-10-CM

## 2022-05-13 DIAGNOSIS — R27.8 DECREASED COORDINATION: ICD-10-CM

## 2022-05-13 DIAGNOSIS — M25.531 PAIN IN RIGHT WRIST: ICD-10-CM

## 2022-05-13 DIAGNOSIS — R29.898 RIGHT HAND WEAKNESS: Primary | ICD-10-CM

## 2022-05-13 PROCEDURE — 97110 THERAPEUTIC EXERCISES: CPT

## 2022-05-13 NOTE — PROGRESS NOTES
"Patient:  Shayy Hunter  Clinic #:  [id number]   Date of Note: 05/13/2022   Referring Physician:  Syd Olmos MD  Diagnosis:    No diagnosis found.     Start Time: 1317  End Time: 1357  Total Time: 40 mins  Visit #: 7/13      Subjective:   Pt reported "I'm pretty good. I was pretty sore after the last session but I still did my exercises at home and it feels good." My thumb and wrist aren't nearly as painful." Pt also reports trying to use R hand at home more with improved discomfort. Reports exercises are going well at home. No increase pain, noted improved function.      Pain: 3 out of 10     Objective:   Patient seen by OT this session. Treatment  consist of the following: Therapeutic exercises     Treatment:     Therapeutic exercises   - R wrist flexion with 1# weight, 3 sets x 12 reps, pain rated 4/10.   - R wrist extension with 1# weight, 3 sets x 12 reps, pain rated 3/10.  - R wrist ulnar/radial deviation AROM, 3 sets x 10 reps, pain rated 4/10.   - R supination/pronation AROM, 3 sets x 12 reps, pain rated 0/10.  - R  strengthening with 3# digiflex, 2 sets x 12 reps, pain rated 3/10.   - R wrist gentle rotation 1 set x 5 reps clockwise and counterclockwise, pain 0/10 increasing to 4/10 when rotating counterclockwise.      Assessment:  Pt will continue to benefit from skilled OT intervention.  Patient continues to demonstrate limitation  with  ROM, Joint mobility, Stiffness, Decreased fine motor coordination, Decreased gross motor coordination, Decreased functional use, Decreased strength and Continued pain. Pt presents with significant improvement in ROM and strength of R wrist and grasp/pinch. Demonstrated good carryover with HEP from previous session, evident in improved ROM and strength as well as comfort level with movement of R hand/wrist. Tolerated addition of R wrist rotation to exercises, as pt reports significant difficulty at home with ADLs requiring wrist rotation. Educated in addition of " gentle rotation in HEP, specifically in warm water, verbalizing good understanding. Continue HEP with theraputty, also.     New/Revised Goals: Continue POC  1.   2.   3.   4.        Plan:  Continue 2x week x 6 weeks  during the certification period from  4/18/22 to 5/30/22  in pursuit of  OT goals.      Gena Banerjee, FELICIA, OTR/L  5/13/2022

## 2022-05-18 ENCOUNTER — CLINICAL SUPPORT (OUTPATIENT)
Dept: REHABILITATION | Facility: HOSPITAL | Age: 52
End: 2022-05-18
Payer: MEDICARE

## 2022-05-18 DIAGNOSIS — R27.8 DECREASED COORDINATION: ICD-10-CM

## 2022-05-18 DIAGNOSIS — R29.898 RIGHT HAND WEAKNESS: Primary | ICD-10-CM

## 2022-05-18 DIAGNOSIS — M25.531 PAIN IN RIGHT WRIST: ICD-10-CM

## 2022-05-18 DIAGNOSIS — M25.631 DECREASED RANGE OF MOTION OF RIGHT WRIST: ICD-10-CM

## 2022-05-18 PROCEDURE — 97110 THERAPEUTIC EXERCISES: CPT

## 2022-05-18 NOTE — PROGRESS NOTES
"Patient:  Shayy Hunter  [facility] #:  [id number]   Date of Note: 05/18/2022   Referring Physician:  Syd Olmos MD  Diagnosis:    Encounter Diagnoses   Name Primary?    Right hand weakness Yes    Decreased coordination     Decreased range of motion of right wrist     Pain in right wrist         Start Time: 1100  End Time: 1200  Total Time:  60 mins  Visit #: 8/13      Subjective:   Pt reported "It's definitely getting better. I tried the rotating exercises and could do that better with less pain." Pt also reports trying to use R hand at home more with improved performance. Reports exercises are going well at home. No increase pain, noted improved function.      Pain: 2 out of 10     Objective:   Patient seen by OT this session. Treatment  consist of the following: Therapeutic exercises     Treatment:     Therapeutic exercises   - R heat pack x 10 mins  - R wrist gentle rotation 2 set x 10 reps clockwise and counterclockwise, pain 2/10.  - R wrist flexion with 1# weight, 3 sets x 12 reps, pain rated 2/10.   - R wrist extension with 1# weight, 3 sets x 12 reps, pain rated 2/10.  - R wrist ulnar/radial deviation AROM, 3 sets x 10 reps, pain rated /10.   - R supination/pronation AROM, 3 sets x 12 reps, pain rated 2/10.  - R  strengthening with 3# digiflex, 2 sets x 12 reps, pain rated 3/10.     Measurements:   Thumb IP Flexion/Extension: WFL  Thumb MC Flexion/Extension: 40*  Thumb Opposition: Limited, 3 cm from full contact of palm for full ROM opposition  Palmar Abduction: Limited, contact-50*  Radial Abduction: Limited, contact-60*     Wrist Ext/Flex: 45*/75*  Wrist RD/UD: Neutral/15*  Supination/Pronation: WFL/WFL  Elbow extension/flexion: WFL/WFL      Strength: (MARY KATE Dynamometer in lbs.) Average 3 trials, Position II  Right: 10#  Left: 45#     Pinch Strength: (Pinch Gauge in lbs), Average 3 trials  Portillo Pinch R) 6#    L) 9#  3pt Pinch   R) 7#   L) 10#  2pt Pinch   R) 4#   L) 10#    Assessment:  Pt will " continue to benefit from skilled OT intervention.  Patient continues to demonstrate limitation  with  ROM, Joint mobility, Stiffness, Decreased fine motor coordination, Decreased gross motor coordination, Decreased functional use, Decreased strength and Continued pain. Pt presents with significant improvement in ROM and strength of R wrist and grasp/pinch. Demonstrated good carryover with HEP from previous session, evident in improved ROM and strength as well as comfort level with movement of R hand/wrist. Tolerated addition of R wrist rotation to exercises. Significant improvement noted in ROM and strength since SOC. Educated in continued gentle rotation in HEP, specifically in warm water, verbalizing good understanding. Continue HEP with theraputty, also.     New/Revised Goals: Continue POC  1.   2.   3.   4.        Plan:  Continue 2x week x 6 weeks  during the certification period from  4/18/22 to 5/30/22  in pursuit of  OT goals.      Gena Banerjee, FELICIA, OTR/L  5/18/2022

## 2022-05-19 NOTE — PLAN OF CARE
Occupational Therapy Upper Extremity Progress Note    Patient:  Shayy Hunter  [facility] #:  [id number]   Date of Note: 05/18/2022   Referring Physician:  Syd Olmos MD  Diagnosis:    Encounter Diagnoses   Name Primary?    Right hand weakness Yes    Decreased coordination      Decreased range of motion of right wrist      Pain in right wrist         Subjective   Pt reports pain 2/10 R wrist/thumb.   Patient's response to therapy: Pt reports significant improvement in use of R hand and decrease in pain/discomfort with use and movement. Pt reports improvement in functional use during ADLs and IADLs.       Objective     Test measurements:      Thumb IP Flexion/Extension: WFL  Thumb MC Flexion/Extension: 40*  Thumb Opposition: Limited, 3 cm from full contact of palm for full ROM opposition  Palmar Abduction: Limited, contact-50*  Radial Abduction: Limited, contact-60*     Wrist Ext/Flex: 45*/75*  Wrist RD/UD: Neutral/15*  Supination/Pronation: WFL/WFL  Elbow extension/flexion: WFL/WFL      Strength: (MARY KATE Dynamometer in lbs.) Average 3 trials, Position II  Right: 10#  Left: 45#     Pinch Strength: (Pinch Gauge in lbs), Average 3 trials  Portillo Pinch R) 6#    L) 9#  3pt Pinch   R) 7#   L) 10#  2pt Pinch   R) 4#   L) 10#    Assessment     - Summary: Pt progressing well. Significant improvement in R thumb/wrist ROM and strength evident in measurements stated above. Pt demonstrated ability to open bottles and small packages with decreased pain. Continued OT services needed to improve R hand strength and function to return to dominant R hand use. Pt continues to have difficulty and unable to perform hair grooming due to pain and limited strength as well as difficulty with toileting saúl care due to limited strength, ROM and pain in dominant R hand.     Progress toward previous goals: Continue STG/LTG      Plan     TREATMENT  Principal of treatment/treatment today: Instructed patient in:  HEP for improved R wrist/thumb  ROM and strength  Therapeutic Exercises: see daily note  Range of Motion Exercises: see daily note  Strengthening Exercises: see daily note    PLAN  Recommendations: Continue current OT POC.           Gena Banerjee, FELICIA, OTR/L  5/18/22    *No MD signature required.*

## 2022-05-20 ENCOUNTER — CLINICAL SUPPORT (OUTPATIENT)
Dept: REHABILITATION | Facility: HOSPITAL | Age: 52
End: 2022-05-20
Payer: MEDICARE

## 2022-05-20 DIAGNOSIS — R29.898 RIGHT HAND WEAKNESS: Primary | ICD-10-CM

## 2022-05-20 DIAGNOSIS — M25.631 DECREASED RANGE OF MOTION OF RIGHT WRIST: ICD-10-CM

## 2022-05-20 DIAGNOSIS — M25.531 PAIN IN RIGHT WRIST: ICD-10-CM

## 2022-05-20 DIAGNOSIS — R27.8 DECREASED COORDINATION: ICD-10-CM

## 2022-05-20 PROCEDURE — 97110 THERAPEUTIC EXERCISES: CPT

## 2022-05-20 NOTE — PROGRESS NOTES
"Patient:  Shayy Hunter  [facility] #:  [id number]   Date of Note: 05/20/2022   Referring Physician:  Syd Olmos MD  Diagnosis:    Encounter Diagnoses   Name Primary?    Right hand weakness Yes    Decreased coordination     Decreased range of motion of right wrist     Pain in right wrist         Start Time: 1101  End Time: 1142  Total Time:  41 mins  Visit #: 9/13      Subjective:   Pt reported "It's definitely doing better." Pt saw surgeon 5/19/22 with pt reporting surgeon was happy with results, indicating pt to continue therapy services. Pt reports pain rated 3/10 R thumb/wrist.     Pain: 3 out of 10     Objective:   Patient seen by OT this session. Treatment  consist of the following: Therapeutic exercises     Treatment:     Therapeutic exercises   - R heat pack x 10 mins  - R wrist gentle rotation 2 set x 10 reps clockwise and counterclockwise, pain 3-4/10.  - R wrist flexion with 1# weight, 3 sets x 12 reps, pain rated 3/10.   - R wrist extension with 1# weight, 3 sets x 12 reps, pain rated 3/10.   - R  strengthening with 3# digiflex, 2 sets x 12 reps, pain rated 3/10.     Assessment:  Pt will continue to benefit from skilled OT intervention.  Patient continues to demonstrate limitation  with  ROM, Joint mobility, Stiffness, Decreased fine motor coordination, Decreased gross motor coordination, Decreased functional use, Decreased strength and Continued pain. Pt presents with significant improvement in ROM and strength of R wrist and grasp/pinch. Demonstrated good carryover with HEP from previous session, evident in improved ROM and strength as well as comfort level with movement of R hand/wrist.  Educated in continued gentle rotation in HEP, specifically in warm water, verbalizing good understanding. Continue HEP with theraputty, also.     New/Revised Goals: Continue POC  1.   2.   3.   4.        Plan:  Continue 2x week x 6 weeks  during the certification period from  4/18/22 to 5/30/22  in pursuit of  " OT goals.      Gena Banerjee, FELICIA, OTR/L  5/20/2022

## 2022-05-25 ENCOUNTER — CLINICAL SUPPORT (OUTPATIENT)
Dept: REHABILITATION | Facility: HOSPITAL | Age: 52
End: 2022-05-25
Payer: MEDICARE

## 2022-05-25 DIAGNOSIS — M25.631 DECREASED RANGE OF MOTION OF RIGHT WRIST: ICD-10-CM

## 2022-05-25 DIAGNOSIS — R27.8 DECREASED COORDINATION: ICD-10-CM

## 2022-05-25 DIAGNOSIS — M25.531 PAIN IN RIGHT WRIST: ICD-10-CM

## 2022-05-25 DIAGNOSIS — R29.898 RIGHT HAND WEAKNESS: Primary | ICD-10-CM

## 2022-05-25 PROCEDURE — 97110 THERAPEUTIC EXERCISES: CPT

## 2022-05-25 NOTE — PROGRESS NOTES
"Patient:  Shayy Hunter  [facility] #:  [id number]   Date of Note: 05/25/2022   Referring Physician:  Syd Olmos MD  Diagnosis:    Encounter Diagnoses   Name Primary?    Right hand weakness Yes    Decreased coordination     Decreased range of motion of right wrist     Pain in right wrist         Start Time: 1121  End Time: 1155  Total Time: 34 mins  Visit #: 10/13      Subjective:   Pt reported "It's doing better." Pt not wearing brace as much at home and trying to use it more with good success.     Pain: 3 out of 10     Objective:   Patient seen by OT this session. Treatment  consist of the following: Therapeutic exercises     Treatment:     Therapeutic exercises   - R wrist gentle rotation 3 set x 10 reps clockwise and counterclockwise, pain 3/10.  - R wrist flexion with 1# weight, 3 sets x 12 reps, pain rated 3/10.   - R wrist extension with 1# weight, 3 sets x 12 reps, pain rated 3/10.   - R  strengthening with 5# digiflex, 3 sets x 12 reps, pain rated 3/10.   - R pincer strengthening with 5# clothespin, 3 sets x 12 reps, pain rated 3/10.  - Ice pack applied x 5 mins at end of tx, tolerating well.     Assessment:  Pt will continue to benefit from skilled OT intervention.  Patient continues to demonstrate limitation  with  ROM, Joint mobility, Stiffness, Decreased fine motor coordination, Decreased gross motor coordination, Decreased functional use, Decreased strength and Continued pain. Pt presents with significant improvement in ROM and strength of R wrist and grasp/pinch. Tolerated increase in resistance this day, no increase in pain. Demonstrated good carryover with HEP from previous session, evident in improved ROM and strength as well as comfort level with movement of R hand/wrist.  Educated in continued gentle rotation in HEP, specifically in warm water, verbalizing good understanding. Continue HEP with theraputty, also.     New/Revised Goals: Continue POC  1.   2.   3.   4.        Plan:  Continue " 2x week x 6 weeks  during the certification period from  4/18/22 to 5/30/22  in pursuit of  OT goals.      Gena Banerjee, FELICIA, OTR/L  5/25/2022

## 2022-05-27 ENCOUNTER — CLINICAL SUPPORT (OUTPATIENT)
Dept: REHABILITATION | Facility: HOSPITAL | Age: 52
End: 2022-05-27
Payer: MEDICARE

## 2022-05-27 DIAGNOSIS — M25.631 DECREASED RANGE OF MOTION OF RIGHT WRIST: ICD-10-CM

## 2022-05-27 DIAGNOSIS — R27.8 DECREASED COORDINATION: ICD-10-CM

## 2022-05-27 DIAGNOSIS — R29.898 RIGHT HAND WEAKNESS: Primary | ICD-10-CM

## 2022-05-27 DIAGNOSIS — M25.531 PAIN IN RIGHT WRIST: ICD-10-CM

## 2022-05-27 PROCEDURE — 97110 THERAPEUTIC EXERCISES: CPT

## 2022-05-27 NOTE — PROGRESS NOTES
"Patient:  Shayy Hunter  [facility] #:  [id number]   Date of Note: 05/27/2022   Referring Physician:  Syd Olmos MD  Diagnosis:    Encounter Diagnoses   Name Primary?    Right hand weakness Yes    Decreased coordination     Decreased range of motion of right wrist     Pain in right wrist         Start Time: 1106  End Time: 1144  Total Time: 38 mins  Visit #: 11/13      Subjective:   Pt reported "It's doing ok today, but Wednesday night after I left here, I was hurting pretty good, probably 8 or 9/10." Pt reported more pain the next day but more improved on this day.     Pain: 3 out of 10     Objective:   Patient seen by OT this session. Treatment  consist of the following: Therapeutic exercises     Treatment:     Therapeutic exercises   - R wrist flexion with 1# weight, 2 sets x 10 reps, pain rated 3/10.   - R wrist extension with 1# weight, 2 sets x 10 reps, pain rated 3/10.   - R  strengthening with 5# digiflex, 2 sets x 10 reps, pain rated 0/10.   - R pincer strengthening with 5# clothespin, 2 sets x 10 reps, pain rated 3/10.  - Ice pack applied x 10 mins at end of tx, tolerating well.     Assessment:  Pt will continue to benefit from skilled OT intervention.  Patient continues to demonstrate limitation  with  ROM, Joint mobility, Stiffness, Decreased fine motor coordination, Decreased gross motor coordination, Decreased functional use, Decreased strength and Continued pain. Pt presents with significant improvement in ROM and strength of R wrist and grasp/pinch, but presents with increased pain after previous tx session. OTR adjusted therex this day, decreasing repetitions while maintaining same level of resistance. Tolerated well with manageable discomfort. Pain 3/10 at end of tx after ice application. Educated in continued gentle rotation in R wrist, specifically in warm water, verbalizing good understanding. Continue HEP with theraputty.     New/Revised Goals: Continue POC  1.   2.   3.   4.  "       Plan:  Continue 2x week x 6 weeks  during the certification period from  4/18/22 to 5/30/22  in pursuit of  OT goals.      Gena Banerjee, OTKARISSA, OTR/L  5/27/2022

## 2022-06-01 ENCOUNTER — CLINICAL SUPPORT (OUTPATIENT)
Dept: REHABILITATION | Facility: HOSPITAL | Age: 52
End: 2022-06-01
Payer: MEDICARE

## 2022-06-01 DIAGNOSIS — M25.631 DECREASED RANGE OF MOTION OF RIGHT WRIST: ICD-10-CM

## 2022-06-01 DIAGNOSIS — R27.8 DECREASED COORDINATION: ICD-10-CM

## 2022-06-01 DIAGNOSIS — M25.531 PAIN IN RIGHT WRIST: ICD-10-CM

## 2022-06-01 DIAGNOSIS — R29.898 RIGHT HAND WEAKNESS: Primary | ICD-10-CM

## 2022-06-01 PROCEDURE — 97110 THERAPEUTIC EXERCISES: CPT

## 2022-06-01 NOTE — PROGRESS NOTES
"Patient:  Shayy Hunter  Clinic #:  [id number]   Date of Note: 06/01/2022   Referring Physician:  Syd Olmos MD  Diagnosis:    Encounter Diagnoses   Name Primary?    Right hand weakness Yes    Decreased coordination     Decreased range of motion of right wrist     Pain in right wrist         Start Time: 1106  End Time: 1148  Total Time: 42 mins  Visit #: 12/13      Subjective:   Pt reported "It's doing ok today, but it was hurting pretty bad again after the last session. I had to take a Meloxicam again." Pt describes pain as throbbing and aching at base of thumb, mainly at night.     Pain: 4 out of 10     Objective:   Patient seen by OT this session. Treatment  consist of the following: Therapeutic exercises     Treatment:     Therapeutic exercises   - R moist heat pack x 10 mins at start of tx, pain decreased to 0/10.   - R wrist flexion with 1# weight, 2 sets x 10 reps, pain rated 3/10.   - R wrist extension with 1# weight, 2 sets x 10 reps, pain rated 3/10.   - R  strengthening with 3# digiflex, 2 sets x 10 reps, pain rated 0/10.   - R pincer strengthening with 3# clothespin, 1 set x 5 reps, pain rated 6/10.  - R thumb AROM extension 1 set x 10 reps, pain 0/10.   - R thumb IP AROM flexion 1 set x 10 reps, pain 0/10.  - R thumb opposition AROM with hand in fisted position, pain 0/10.  - Pt refused ice pack at end of tx this day stating "no I think it's fine, I don't need any ice." Pain rated 4/10 at end of tx.      Assessment:  Pt will continue to benefit from skilled OT intervention.  Patient continues to demonstrate limitation  with  ROM, Joint mobility, Stiffness, Decreased fine motor coordination, Decreased gross motor coordination, Decreased functional use, Decreased strength and Continued pain. Pt presents with significant improvement in ROM and strength of R wrist and grasp/pinch, but presents with increased pain after previous 2 tx sessions. OTR continued to adjust therex this day, decreasing " repetitions and resistance. Tolerated well with manageable discomfort. Pain 4/10 at end of tx, with pt refusing use of ice application. Educated in continued gentle rotation in R wrist, specifically in warm water, verbalizing good understanding. Instructed pt to discontinue theraputty use at this time and focus mainly on wrist ROM in warm water and AROM thumb. Verbalized and demonstrated good understanding/carryover.      New/Revised Goals: Continue POC  1.   2.   3.   4.        Plan:  Continue 2x week x 6 weeks  during the certification period from  4/18/22 to 5/30/22  in pursuit of  OT goals.      FELICIA Taylor, OTR/L  6/1/2022

## 2022-06-02 ENCOUNTER — OFFICE VISIT (OUTPATIENT)
Dept: FAMILY MEDICINE | Facility: CLINIC | Age: 52
End: 2022-06-02
Payer: MEDICARE

## 2022-06-02 VITALS
DIASTOLIC BLOOD PRESSURE: 90 MMHG | HEIGHT: 69 IN | OXYGEN SATURATION: 99 % | WEIGHT: 271.81 LBS | HEART RATE: 93 BPM | RESPIRATION RATE: 20 BRPM | TEMPERATURE: 97 F | BODY MASS INDEX: 40.26 KG/M2 | SYSTOLIC BLOOD PRESSURE: 140 MMHG

## 2022-06-02 DIAGNOSIS — R09.81 CONGESTION OF NASAL SINUS: ICD-10-CM

## 2022-06-02 DIAGNOSIS — R60.9 EDEMA, UNSPECIFIED TYPE: ICD-10-CM

## 2022-06-02 DIAGNOSIS — M62.838 MUSCLE SPASM: ICD-10-CM

## 2022-06-02 DIAGNOSIS — M47.892 OTHER OSTEOARTHRITIS OF SPINE, CERVICAL REGION: Primary | ICD-10-CM

## 2022-06-02 DIAGNOSIS — F33.0 MILD EPISODE OF RECURRENT MAJOR DEPRESSIVE DISORDER: ICD-10-CM

## 2022-06-02 DIAGNOSIS — T78.40XD ALLERGY, SUBSEQUENT ENCOUNTER: ICD-10-CM

## 2022-06-02 DIAGNOSIS — R11.0 NAUSEA: ICD-10-CM

## 2022-06-02 PROCEDURE — 99214 OFFICE O/P EST MOD 30 MIN: CPT | Mod: ,,, | Performed by: INTERNAL MEDICINE

## 2022-06-02 PROCEDURE — 99214 PR OFFICE/OUTPT VISIT, EST, LEVL IV, 30-39 MIN: ICD-10-PCS | Mod: ,,, | Performed by: INTERNAL MEDICINE

## 2022-06-02 RX ORDER — PROMETHAZINE HYDROCHLORIDE 25 MG/1
25 TABLET ORAL EVERY 8 HOURS PRN
Qty: 30 TABLET | Refills: 0 | Status: SHIPPED | OUTPATIENT
Start: 2022-06-02 | End: 2022-07-11 | Stop reason: SDUPTHER

## 2022-06-02 RX ORDER — BACLOFEN 10 MG/1
10 TABLET ORAL 3 TIMES DAILY
Qty: 90 TABLET | Refills: 0 | Status: SHIPPED | OUTPATIENT
Start: 2022-06-02 | End: 2022-07-11 | Stop reason: SDUPTHER

## 2022-06-02 RX ORDER — CETIRIZINE HYDROCHLORIDE 10 MG/1
10 TABLET ORAL DAILY
Qty: 90 TABLET | Refills: 1 | Status: SHIPPED | OUTPATIENT
Start: 2022-06-02 | End: 2022-12-12 | Stop reason: SDUPTHER

## 2022-06-02 RX ORDER — FLUTICASONE PROPIONATE 50 MCG
2 SPRAY, SUSPENSION (ML) NASAL DAILY PRN
Qty: 16 G | Refills: 2 | Status: CANCELLED | OUTPATIENT
Start: 2022-06-02

## 2022-06-02 RX ORDER — QUETIAPINE FUMARATE 100 MG/1
100-200 TABLET, FILM COATED ORAL NIGHTLY
COMMUNITY
Start: 2022-05-12 | End: 2022-07-11 | Stop reason: SDUPTHER

## 2022-06-02 RX ORDER — DEXLANSOPRAZOLE 60 MG/1
60 CAPSULE, DELAYED RELEASE ORAL DAILY
Qty: 90 CAPSULE | Refills: 1 | Status: SHIPPED | OUTPATIENT
Start: 2022-06-02 | End: 2023-01-05 | Stop reason: SDUPTHER

## 2022-06-02 RX ORDER — FUROSEMIDE 20 MG/1
20 TABLET ORAL DAILY
Qty: 90 TABLET | Refills: 1 | Status: SHIPPED | OUTPATIENT
Start: 2022-06-02 | End: 2023-01-05 | Stop reason: SDUPTHER

## 2022-06-02 RX ORDER — MELOXICAM 15 MG/1
15 TABLET ORAL DAILY
Qty: 30 TABLET | Refills: 3 | Status: SHIPPED | OUTPATIENT
Start: 2022-06-02 | End: 2022-10-05 | Stop reason: SDUPTHER

## 2022-06-02 RX ORDER — MELOXICAM 15 MG/1
TABLET ORAL
COMMUNITY
Start: 2022-05-11 | End: 2022-06-02 | Stop reason: SDUPTHER

## 2022-06-02 NOTE — PATIENT INSTRUCTIONS
Shayy was seen today for follow-up, hypertension and medication refill.    Diagnoses and all orders for this visit:    Mild episode of recurrent major depressive disorder    Muscle spasm  The following orders have not been finalized:  -     baclofen (LIORESAL) 10 MG tablet    Allergy, subsequent encounter  The following orders have not been finalized:  -     cetirizine (ZYRTEC) 10 MG tablet    Congestion of nasal sinus  The following orders have not been finalized:  -     fluticasone propionate (FLONASE) 50 mcg/actuation nasal spray    Edema, unspecified type  The following orders have not been finalized:  -     furosemide (LASIX) 20 MG tablet    Nausea  The following orders have not been finalized:  -     promethazine (PHENERGAN) 25 MG tablet    Other osteoarthritis of spine, cervical region    Other orders  The following orders have not been finalized:  -     meloxicam (MOBIC) 15 MG tablet  -     dexlansoprazole (DEXILANT) 60 mg capsule

## 2022-06-02 NOTE — PROGRESS NOTES
Subjective:       Patient ID: Shayy Hunter is a 51 y.o. female.    Chief Complaint: Follow-up, Hypertension, and Medication Refill    Patient is here today for a follow up evaluation. Patient pressure is increased today on intake. Patient states she has back and neck pain. Patient states the pain is 9/10. Will order Mobic 15mg PO qAM #30 RF3. Will refer to Ortho. Will refill medications today. Will follow in 3 months.       Current Medications:    Current Outpatient Medications:     baclofen (LIORESAL) 10 MG tablet, Take 1 tablet (10 mg total) by mouth 3 (three) times daily., Disp: 90 tablet, Rfl: 0    cetirizine (ZYRTEC) 10 MG tablet, Take 1 tablet (10 mg total) by mouth once daily., Disp: 30 tablet, Rfl: 0    dexlansoprazole (DEXILANT) 60 mg capsule, Take 1 capsule (60 mg total) by mouth once daily., Disp: 30 capsule, Rfl: 5    fluticasone propionate (FLONASE) 50 mcg/actuation nasal spray, 2 sprays (100 mcg total) by Each Nostril route daily as needed for Rhinitis., Disp: 16 g, Rfl: 0    furosemide (LASIX) 20 MG tablet, Take 1 tablet (20 mg total) by mouth once daily., Disp: 90 tablet, Rfl: 1    lamoTRIgine (LAMICTAL) 200 MG tablet, , Disp: , Rfl:     meloxicam (MOBIC) 15 MG tablet, , Disp: , Rfl:     oxycodone-acetaminophen  mg (PERCOCET)  mg per tablet, Take 1 tablet by mouth daily as needed., Disp: , Rfl:     potassium chloride (KLOR-CON) 8 MEQ TbSR, Take 1 tablet (8 mEq total) by mouth once daily., Disp: 90 tablet, Rfl: 1    pregabalin (LYRICA) 150 MG capsule, Take 2 capsules by mouth once daily., Disp: , Rfl:     promethazine (PHENERGAN) 25 MG tablet, Take 1 tablet (25 mg total) by mouth every 8 (eight) hours as needed for Nausea., Disp: 30 tablet, Rfl: 0    SEROQUEL 100 mg Tab, Take 100-200 mg by mouth nightly., Disp: , Rfl:     topiramate (TOPAMAX) 100 MG tablet, Take 100 mg by mouth every evening., Disp: , Rfl:     ziprasidone (GEODON) 60 MG Cap, Take 1 capsule by mouth once  daily., Disp: , Rfl:     Current Facility-Administered Medications:     triamcinolone acetonide injection 80 mg, 80 mg, INTRABURSAL, 1 time in Clinic/HOD, Bertram Gibson MD    Last Labs:     No visits with results within 1 Month(s) from this visit.   Latest known visit with results is:   Office Visit on 02/08/2022   Component Date Value    SARS Coronavirus 2 Antig* 02/08/2022 Negative      Acceptab* 02/08/2022 Yes        Last Imaging:  US Abdomen Complete  Narrative: EXAMINATION:  US ABDOMEN COMPLETE    CLINICAL HISTORY:  Diarrhea, unspecified    COMPARISON:  Abdominal ultrasound July 10, 2008    TECHNIQUE:  Multiple longitudinal and transverse real-time sonographic images of the abdomen are obtained.    FINDINGS:  The liver measures 16.9 cm and demonstrates increased echogenicity without focal abnormality on submitted images. Prior cholecystectomy.  The common duct measures 0.85 cm in diameter. There is no evidence of intrahepatic ductal dilatation.    The right and left kidneys measure 11.2 cm and 11.1 cm, respectively. There is no hydronephrosis.    The spleen measures 10.7 cm without focal abnormality.    Evaluation of the pancreas limited secondary to bowel gas.    IVC and aorta: Visualized portions grossly unremarkable.    No evidence of ascites.  Impression: Increased echogenicity of the liver suggestive of steatosis.  Prior cholecystectomy.    Point of Service: [facility]    Electronically signed by: Tyrone Asher  Date:    11/17/2021  Time:    08:54         Review of Systems   Musculoskeletal: Positive for back pain and neck pain.   All other systems reviewed and are negative.        Objective:      Physical Exam  Vitals reviewed.   Constitutional:       Appearance: Normal appearance. She is normal weight.   Cardiovascular:      Rate and Rhythm: Normal rate and regular rhythm.      Pulses: Normal pulses.      Heart sounds: Normal heart sounds.   Pulmonary:      Effort:  Pulmonary effort is normal.      Breath sounds: Normal breath sounds.   Abdominal:      General: Abdomen is flat. Bowel sounds are normal.      Palpations: Abdomen is soft.   Musculoskeletal:         General: Normal range of motion.      Cervical back: Normal range of motion and neck supple.   Skin:     General: Skin is warm and dry.   Neurological:      General: No focal deficit present.      Mental Status: She is alert and oriented to person, place, and time. Mental status is at baseline.         Assessment:       1. Mild episode of recurrent major depressive disorder     2. Muscle spasm     3. Allergy, subsequent encounter     4. Congestion of nasal sinus     5. Edema, unspecified type Active    6. Nausea     7. Other osteoarthritis of spine, cervical region          Plan:         Shayy was seen today for follow-up, hypertension and medication refill.    Diagnoses and all orders for this visit:    Mild episode of recurrent major depressive disorder    Muscle spasm  The following orders have not been finalized:  -     baclofen (LIORESAL) 10 MG tablet    Allergy, subsequent encounter  The following orders have not been finalized:  -     cetirizine (ZYRTEC) 10 MG tablet    Congestion of nasal sinus  The following orders have not been finalized:  -     fluticasone propionate (FLONASE) 50 mcg/actuation nasal spray    Edema, unspecified type  The following orders have not been finalized:  -     furosemide (LASIX) 20 MG tablet    Nausea  The following orders have not been finalized:  -     promethazine (PHENERGAN) 25 MG tablet    Other osteoarthritis of spine, cervical region    Other orders  The following orders have not been finalized:  -     meloxicam (MOBIC) 15 MG tablet  -     dexlansoprazole (DEXILANT) 60 mg capsule

## 2022-06-06 ENCOUNTER — CLINICAL SUPPORT (OUTPATIENT)
Dept: REHABILITATION | Facility: HOSPITAL | Age: 52
End: 2022-06-06
Payer: MEDICARE

## 2022-06-06 DIAGNOSIS — R27.8 DECREASED COORDINATION: ICD-10-CM

## 2022-06-06 DIAGNOSIS — R29.898 RIGHT HAND WEAKNESS: Primary | ICD-10-CM

## 2022-06-06 DIAGNOSIS — M25.531 PAIN IN RIGHT WRIST: ICD-10-CM

## 2022-06-06 DIAGNOSIS — M25.631 DECREASED RANGE OF MOTION OF RIGHT WRIST: ICD-10-CM

## 2022-06-06 PROCEDURE — 97110 THERAPEUTIC EXERCISES: CPT

## 2022-06-06 NOTE — PROGRESS NOTES
"Patient:  Shayy Hunter  Clinic #:  [id number]   Date of Note: 06/06/2022   Referring Physician:  Syd Olmos MD  Diagnosis:    Encounter Diagnoses   Name Primary?    Right hand weakness Yes    Decreased coordination     Decreased range of motion of right wrist     Pain in right wrist         Start Time: 1102  End Time: 1150  Total Time: 48 mins  Visit #: 13/21      Subjective:   Pt reported "I haven't had that bad pain anymore, just achy and throbbing at times." Pt describes pain as throbbing and aching at base of thumb, mainly at night. Pt reports hurting "all over," attributed to Fibromyalgia pain/discomfort. Pt reported taken pain meds prior to coming to each therapy appointment for back pain but the pain meds "don't affect the thumb or wrist pain." Pt reports having to take Meloxicam for wrist/thumb pain.     Pain: 4 out of 10     Objective:   Patient seen by OT this session. Treatment  consist of the following: Therapeutic exercises     Treatment:     Therapeutic exercises   - R moist heat pack x 10 mins at start of tx, pain decreased to 3/10.   - R wrist flexion with 1# weight, 3 sets x 10 reps, pain rated 3/10.   - R wrist extension with 1# weight, 3 sets x 10 reps, pain rated 4/10.   - R  strengthening with 3# digiflex, 3 sets x 12 reps, pain rated 0/10.    Measurements Assessed:    Thumb IP Flexion/Extension: WFL  Thumb MC Flexion/Extension: 43*  Thumb Opposition: Limited, 3 cm from full contact of palm for full ROM opposition  Palmar Abduction: Limited, contact-70*  Radial Abduction: Limited, contact-65*     Wrist Ext/Flex: 52*/76*  Wrist RD/UD: 10*/20*  Supination/Pronation: WFL/WFL  Elbow extension/flexion: WFL/WFL      Strength: (MARY KATE Dynamometer in lbs.) Average 3 trials, Position II  Right: 15#  Left: 35#     Pinch Strength: (Pinch Gauge in lbs), Average 3 trials  Portillo Pinch R) 6#    L) 13#  3pt Pinch   R) 8#   L) 10#  2pt Pinch   R) 5#   L) 10#      Assessment:  Pt will continue to " benefit from skilled OT intervention.  Patient continues to demonstrate limitation  with  ROM, Joint mobility, Stiffness, Decreased fine motor coordination, Decreased gross motor coordination, Decreased functional use, Decreased strength and Continued pain. Pt presents with significant improvement in ROM and strength of R wrist and grasp/pinch as well as improved pain in comparison to previous tx sessions. Educated in continue gentle rotation in R wrist, specifically in warm water, verbalizing good understanding. Instructed pt to discontinue theraputty use at this time and focus mainly on wrist ROM in warm water and AROM thumb. Verbalized and demonstrated good understanding/carryover.      New/Revised Goals: Continue POC  1.   2.   3.   4.        Plan:  Continue 2x week x 4 weeks  during the certification period from  6/6/22 to 7/4/22  in pursuit of  OT goals.      FELICIA Taylor, OTR/L  6/6/2022

## 2022-06-06 NOTE — PLAN OF CARE
OCCUPATIONAL THERAPY UPDATED PLAN OF TREATMENT    Patient name: Shayy Hunter  Onset Date:  [birth year]30[birth year]  SOC Date:  4/18/22  Primary Diagnosis:  S/p De Quervain's release surgery  Treatment Diagnosis:  R hand weakness, Pain in R wrist  Certification Period:  6/6/22 to 7/4/22  Precautions:  Universal  Visits from SOC:  13  Functional Level Prior to SOC:  Independent with h/o TB pain associated with Fibromyalgia    Updated Assessment:    Thumb IP Flexion/Extension: WFL  Thumb MC Flexion/Extension: 43*  Thumb Opposition: Limited, 3 cm from full contact of palm for full ROM opposition  Palmar Abduction: Limited, contact-70*  Radial Abduction: Limited, contact-65*     Wrist Ext/Flex: 52*/76*  Wrist RD/UD: 10*/20*  Supination/Pronation: WFL/WFL  Elbow extension/flexion: WFL/WFL      Strength: (MARY KATE Dynamometer in lbs.) Average 3 trials, Position II  Right: 15#  Left: 35#     Pinch Strength: (Pinch Gauge in lbs), Average 3 trials  Portillo Pinch R) 6#    L) 13#  3pt Pinch   R) 8#   L) 10#  2pt Pinch   R) 5#   L) 10#    Previous Short Term Goals Status:   Patient to be IND with HEP and modalities for pain/edema managment. Ongoing, Progressing.   Increase ROM 3-5 degrees to increase functional hand use for ADLs/work/leisure activities.Ongoing, Progressing.   Increase  strength 3-5 lbs. to improve functional grasp for ADLs/work/leisure activities.Ongoing, Progressing.   Increase pinch 1-3 lbs to increase IND with button and FM Coordination. Ongoing, Progressing.   Decrease complaints of pain to  4 out of 10 in R wrist/hand to increase functional hand use for ADL/work/leisure activities. Met.    New Short Term Goals (to be achieved by end of cert. Period):    Continue current STGs with upgrade to pain goal.   Decrease complaints of pain to 2 out of 10 in R wrist/hand to increase functional hand use for ADL/work/leisure activities.     Long Term Goal Status: Continue per initial plan of care.     Reasons for Recertification of  Therapy:  Pt requires continued skilled OT services to improve dominant R hand strength, ROM, and functional use. Pt continues to demonstrate significant difficulty with hair grooming and posterior saúl care directly related to pain, weakness, and ROM of R hand.     Recommended Treatment Plan: Therapeutic Exercise, Functional Activities, ADL Training and Moist Heat/Ice/Paraffin    Other recommendations:  NA    Therapist's Name: FELICIA Taylor, OTR/L       Date: 06/06/2022     I CERTIFY THE NEED FOR THESE SERVICES FURNISHED UNDER THIS PLAN OF TREATMENT AND WHILE UNDER MY CARE    Physician's comments: ________________________________________________________________________________________________________________________________________________      Physician's Name (print): ___________________________________    Physician's Signature: _______________________________________________    Date: ________________________

## 2022-06-08 ENCOUNTER — CLINICAL SUPPORT (OUTPATIENT)
Dept: REHABILITATION | Facility: HOSPITAL | Age: 52
End: 2022-06-08
Payer: MEDICARE

## 2022-06-08 DIAGNOSIS — M25.631 DECREASED RANGE OF MOTION OF RIGHT WRIST: ICD-10-CM

## 2022-06-08 DIAGNOSIS — R27.8 DECREASED COORDINATION: ICD-10-CM

## 2022-06-08 DIAGNOSIS — M25.531 PAIN IN RIGHT WRIST: ICD-10-CM

## 2022-06-08 DIAGNOSIS — R29.898 RIGHT HAND WEAKNESS: Primary | ICD-10-CM

## 2022-06-08 PROCEDURE — 97530 THERAPEUTIC ACTIVITIES: CPT

## 2022-06-08 PROCEDURE — 97535 SELF CARE MNGMENT TRAINING: CPT

## 2022-06-08 NOTE — PROGRESS NOTES
"Patient:  Shayy Hunter  [facility] #:  [id number]   Date of Note: 06/08/2022   Referring Physician:  Syd Olmos MD  Diagnosis:    No diagnosis found.     Start Time: 1403  End Time: 1500  Total Time: 57 mins  Visit #: 14/21      Subjective:   Pt reported "I haven't had that excruciating pain anymore, just achy and throbbing at times," at base of thumb, mainly at night. Pt reported having to take Meloxicam for wrist/thumb pain again.     Pain: 4 out of 10     Objective:   Patient seen by OT this session. Treatment  consist of the following: Therapeutic exercises     Treatment:     Therapeutic exercises   - R moist heat pack x 10 mins at start of tx, pain decreased to 3/10.   - R wrist flexion with 1# weight, 3 sets x 10 reps, pain rated 3/10.   - R wrist extension with 1# weight, 3 sets x 10 reps, pain rated 4/10.   - R  strengthening with 3# digiflex, 3 sets x 12 reps, pain rated 2/10.     Self care:  - Assessment of posterior saúl care performance, assessing R hand mobility, strength, and pain associated with task. Pt rated pain 6/10 when attempting to perform posterior saúl care. Pt successfully completes posterior saúl care with R UE, but reports significant pain associated with wrist flexion, rotation and thumb opposition. Educated in addition of HEP exercises for addressing ongoing mobility and strength of wrist and thumb while encouraging decreased pain. Demonstrated good understanding and carryover.     Therapeutic exercises:  - Ended tx with further R hand exercise challenging AROM thumb opposition x 5, pain rated 4/10.   - AROM thumb flexion/extension x 5, pain rated 4/10.       Assessment:  Pt will continue to benefit from skilled OT intervention.  Patient continues to demonstrate limitation  with  ROM, Joint mobility, Stiffness, Decreased fine motor coordination, Decreased gross motor coordination, Decreased functional use, Decreased strength and Continued pain. Pt presents with significant " improvement in ROM and strength of R wrist and grasp/pinch as well as improved pain in comparison to previous tx sessions. Educated in continue gentle rotation in R wrist, specifically in warm water, verbalizing good understanding. Instructed pt to perform HEP of AROM wrist/thumb in water only. Addition of wrist circumduction with hand is in pincer tripod pinch position.. Verbalized and demonstrated good understanding/carryover.      New/Revised Goals: Continue POC  1.   2.   3.   4.        Plan:  Continue 2x week x 4 weeks  during the certification period from  6/6/22 to 7/4/22  in pursuit of  OT goals.      Gena Banerjee, FELICIA, OTR/L  6/8/2022

## 2022-06-13 ENCOUNTER — CLINICAL SUPPORT (OUTPATIENT)
Dept: REHABILITATION | Facility: HOSPITAL | Age: 52
End: 2022-06-13
Payer: MEDICARE

## 2022-06-13 DIAGNOSIS — R29.898 RIGHT HAND WEAKNESS: Primary | ICD-10-CM

## 2022-06-13 DIAGNOSIS — M25.631 DECREASED RANGE OF MOTION OF RIGHT WRIST: ICD-10-CM

## 2022-06-13 DIAGNOSIS — M25.531 PAIN IN RIGHT WRIST: ICD-10-CM

## 2022-06-13 DIAGNOSIS — R27.8 DECREASED COORDINATION: ICD-10-CM

## 2022-06-13 PROCEDURE — 97110 THERAPEUTIC EXERCISES: CPT

## 2022-06-13 NOTE — PROGRESS NOTES
"Patient:  Shayy Hunter  [facility] #:  [id number]   Date of Note: 06/13/2022   Referring Physician:  Syd Olmos MD  Diagnosis:    Encounter Diagnoses   Name Primary?    Right hand weakness Yes    Decreased coordination     Decreased range of motion of right wrist     Pain in right wrist         Start Time: 1315  End Time: 1357  Total Time: 42 mins  Visit #: 15/21      Subjective:   Pt reported "For the first time, I've felt like I can use my hand so much better with much less pain." Pt reported being able to use R hand better, little pain throughout the day.      Pain: 2 out of 10     Objective:   Patient seen by OT this session. Treatment  consist of the following: Therapeutic exercises     Treatment:     Therapeutic exercises   - R moist heat pack x 10 mins at start of tx, pain decreased to 0/10.   - R wrist flexion with 1# weight, 3 sets x 10 reps, pain rated 2/10.   - R wrist extension with 1# weight, 3 sets x 10 reps, pain rated 3/10.   - R wrist circumduction clockwise and counter clockwise AROM, 3 sets x 10 reps, pain rated 2/10.  - R  AROM thumb adduction, opposition, and extension 2 sets x 10 reps, pain rated 2/10.      Assessment:  Pt will continue to benefit from skilled OT intervention.  Patient continues to demonstrate limitation  with  ROM, Joint mobility, Stiffness, Decreased fine motor coordination, Decreased gross motor coordination, Decreased functional use, Decreased strength and Continued pain. Pt presents with significant improvement in ROM and strength of R wrist and grasp/pinch as well as improved pain in comparison to previous tx sessions. Reported improved function with R hand at home, indicating recent improvement in self care tasks with significantly less pain. Tolerating addition of R wrist circumduction with hand in tripod position while in warm water. Educated to continue gentle rotation in R wrist, thumb AROM including abduction, opposition, and extension. Verbalized good " "understanding and carryover. Pt tearful at end of tx due to "feeling grateful" for the progress she has attained thus far. Encouraged pt to continue working hard at home with HEP to continue consistent progress and decreased pain. Verbalized good understanding.     New/Revised Goals: Continue POC  1.   2.   3.   4.        Plan:  Continue 2x week x 4 weeks  during the certification period from  6/6/22 to 7/4/22  in pursuit of  OT goals.      Gena Banerjee, OTKARISSA, OTR/L  6/13/2022    "

## 2022-06-23 ENCOUNTER — CLINICAL SUPPORT (OUTPATIENT)
Dept: REHABILITATION | Facility: HOSPITAL | Age: 52
End: 2022-06-23
Payer: MEDICARE

## 2022-06-23 DIAGNOSIS — R27.8 DECREASED COORDINATION: ICD-10-CM

## 2022-06-23 DIAGNOSIS — M25.531 PAIN IN RIGHT WRIST: ICD-10-CM

## 2022-06-23 DIAGNOSIS — R29.898 RIGHT HAND WEAKNESS: Primary | ICD-10-CM

## 2022-06-23 DIAGNOSIS — M25.631 DECREASED RANGE OF MOTION OF RIGHT WRIST: ICD-10-CM

## 2022-06-23 PROCEDURE — 97530 THERAPEUTIC ACTIVITIES: CPT

## 2022-06-23 PROCEDURE — 97110 THERAPEUTIC EXERCISES: CPT

## 2022-06-23 NOTE — PROGRESS NOTES
"Patient:  Shayy Hunter  [facility] #:  [id number]   Date of Note: 06/23/2022   Referring Physician:  Syd Olmos MD  Diagnosis:    Encounter Diagnoses   Name Primary?    Right hand weakness Yes    Decreased coordination     Decreased range of motion of right wrist     Pain in right wrist         Start Time: 1319  End Time: 1402  Total Time: 43 mins  Visit #: 16/21      Subjective:   Pt reported a "big jolt of pain" in R thumb web space when brushing hair recently. Pain described as sharp and radiating up R arm, with pain lingering " a few hours."       Pain: 0 out of 10 at rest    Objective:   Patient seen by OT this session. Treatment  consist of the following: Therapeutic exercises and Therapeutic activity     Treatment:     Therapeutic exercises   - R moist heat pack x 10 mins at start of tx, pain decreased to 0/10.   - R wrist flexion/extension while maintaining tripod grasp of yellow clothespin, with forearm in neutral position on table top. Performed 3 sets x 10 reps. Pain 1/10. Attempted strengthening with forearm on wedge with pt unable to tolerate d/t wrist pain rated 5/10. With grading of exercise, pt tolerated well.    - R wrist circumduction clockwise and counter clockwise AROM, 1 sets x 10 reps, pain rated 2/10.    Therapeutic activity  - Performed writing task, attempting use of pen with pain rated 5/10. Adjusted task to use of large marker, with pain decreasing to /110. Performed writing task with improved ability to grasp writing utensil and legibly wrist/sign name. Instructed pt to continue writing practice with large marker at home to improve grasp, wrist/thumb ROM and manipulation while decreasing pain. Verbalized good understanding.         Assessment:  Pt will continue to benefit from skilled OT intervention.  Patient continues to demonstrate limitation  with  ROM, Joint mobility, Stiffness, Decreased fine motor coordination, Decreased gross motor coordination, Decreased functional use, " "Decreased strength and Continued pain. Pt presents with significant improvement in ROM and strength of R wrist and grasp/pinch as well as improved pain in comparison to previous tx sessions. Reported improved function with R hand at home, indicating continued improvement in self care tasks with significantly less pain. Pt continues to report intermittent difficuklty with uncontrolled movements of B hands/fingers when performing various tasks at home. Pt reports fingers "lock up" or "straighten out and don't want to bend." Pt reports she has discussed with her physician with no diagnosis or assessment at this time. Encouraged pt to research her medications and possible side effects including tardive dyskinesia then discuss with her physician. Tolerating addition of R wrist circumduction with hand in tripod position while in warm water. Educated to continue gentle rotation in R wrist, thumb AROM including abduction, opposition, and extension; addition of writing tasks at home. Verbalized good understanding and carryover.     New/Revised Goals: Continue POC  1.   2.   3.   4.        Plan:  Continue 2x week x 4 weeks  during the certification period from  6/6/22 to 7/4/22  in pursuit of  OT goals.      Gena Banerjee, FELICIA, OTR/L  6/23/2022    "

## 2022-06-30 ENCOUNTER — CLINICAL SUPPORT (OUTPATIENT)
Dept: REHABILITATION | Facility: HOSPITAL | Age: 52
End: 2022-06-30
Payer: MEDICARE

## 2022-06-30 DIAGNOSIS — R29.898 RIGHT HAND WEAKNESS: Primary | ICD-10-CM

## 2022-06-30 DIAGNOSIS — M25.631 DECREASED RANGE OF MOTION OF RIGHT WRIST: ICD-10-CM

## 2022-06-30 DIAGNOSIS — R27.8 DECREASED COORDINATION: ICD-10-CM

## 2022-06-30 DIAGNOSIS — M25.531 PAIN IN RIGHT WRIST: ICD-10-CM

## 2022-06-30 PROCEDURE — 97110 THERAPEUTIC EXERCISES: CPT

## 2022-06-30 NOTE — PROGRESS NOTES
"Patient:  Shayy Hunter  Clinic #:  [id number]   Date of Note: 06/30/2022   Referring Physician:  Syd Olmos MD  Diagnosis:    Encounter Diagnoses   Name Primary?    Right hand weakness Yes    Decreased coordination     Decreased range of motion of right wrist     Pain in right wrist         Start Time: 1318  End Time: 1402  Total Time: 44 mins  Visit #: 17/21      Subjective:   Pt reported "I've seen a big difference. I don't hurt near as badly anymore. I even poured water from a pitcher with my R hand." Pt reports no longer having the "jolts" of pain in R wrist/thumb any longer and has reported improved functional use of R hand with BADL/IADLs.       Pain: 0 out of 10 at rest    Objective:   Patient seen by OT this session. Treatment  consist of the following: Therapeutic exercises     Treatment:     Therapeutic exercises   - R moist heat pack x 10 mins at start of tx to warm up wrist/hand to prepare for putty exercise.   - Green putty R hand strengthening: gripping, pinching, pulling, twisting, rolling, pushing. Tolerated well with pt reporting pain no greater than 2/10 throughout tx. Extended time needed for putty due to weakness in R hand, multiple rest breaks needed.      Assessment:  Pt will continue to benefit from skilled OT intervention.  Patient continues to demonstrate limitation  with  ROM, Joint mobility, Stiffness, Decreased fine motor coordination, Decreased gross motor coordination, Decreased functional use, Decreased strength and Continued pain. Pt presents with significant improvement in ROM and strength of R wrist and grasp/pinch as well as improved pain in comparison to previous tx sessions. Reported improved function with R hand at home, indicating continued improvement in self care tasks with significantly less pain. Pt continues to report intermittent difficulty with uncontrolled movements of B hands/fingers when performing various tasks at home. Pt reports fingers "lock up" or " ""straighten out and don't want to bend." Tolerating R wrist circumduction with hand in tripod position while in warm water. Pt reports improved wristing ability at home, educated to continue writing as part of HEP. Educated to continue gentle rotation in R wrist, thumb AROM including abduction, opposition, and extension, as well as continue use of yellow theraputty. Verbalized good understanding and carryover.     New/Revised Goals: Continue POC  1.   2.   3.   4.        Plan:  Continue 2x week x 4 weeks  during the certification period from  6/6/22 to 7/4/22  in pursuit of  OT goals.      Gena Banerjee, FELICIA, OTR/L  6/30/2022    "

## 2022-07-06 ENCOUNTER — CLINICAL SUPPORT (OUTPATIENT)
Dept: REHABILITATION | Facility: HOSPITAL | Age: 52
End: 2022-07-06
Payer: MEDICARE

## 2022-07-06 DIAGNOSIS — R27.8 DECREASED COORDINATION: ICD-10-CM

## 2022-07-06 DIAGNOSIS — M25.631 DECREASED RANGE OF MOTION OF RIGHT WRIST: ICD-10-CM

## 2022-07-06 DIAGNOSIS — M25.531 PAIN IN RIGHT WRIST: ICD-10-CM

## 2022-07-06 DIAGNOSIS — R29.898 RIGHT HAND WEAKNESS: Primary | ICD-10-CM

## 2022-07-06 PROCEDURE — 97110 THERAPEUTIC EXERCISES: CPT

## 2022-07-06 PROCEDURE — 97530 THERAPEUTIC ACTIVITIES: CPT

## 2022-07-06 PROCEDURE — 97010 HOT OR COLD PACKS THERAPY: CPT

## 2022-07-06 NOTE — PROGRESS NOTES
"Patient:  Shayy Hunter  Clinic #:  [id number]   Date of Note: 07/06/2022   Referring Physician:  Syd Olmos MD  Diagnosis:    Encounter Diagnoses   Name Primary?    Right hand weakness Yes    Decreased coordination     Decreased range of motion of right wrist     Pain in right wrist         Start Time: 1405  End Time: 1455  Total Time: 50 mins  Visit #: 18/21      Subjective:   Pt reported "I've seen a big difference. I don't hurt near as badly anymore. I even poured water from a pitcher with my R hand." Pt reports no longer having the "jolts" of pain in R wrist/thumb any longer and has reported improved functional use of R hand with BADL/IADLs.       Pain: 0 out of 10 at rest    Objective:   Patient seen by OT this session. Treatment  consist of the following: Therapeutic exercises     Treatment:   Modalities:  - R moist heat pack x 10 mins at start of tx to warm up wrist/hand to prepare for putty exercise.   - Cold pack to Right wrist/hand x 10 min following Tx due to swelling during green t-putty activity.    Therapeutic exercises: Right UE  - Thumb abd/add x 20 reps  - Thumb opposition x 20 reps  -  Supination/pronation x 20 reps    Therapeutic activities:  - Green putty R hand strengthening: gripping, pinching, pulling, twisting, rolling, pushing. Tolerated well with pt reporting pain no greater than 2/10 throughout tx. Extended time needed for putty due to weakness in R hand, multiple rest breaks needed.    - Clothes pin tree: patient donned 1#/2# pinch pins using tip and tripod grasp and no difficulty.    -   Assessment:  Pt will continue to benefit from skilled OT intervention.  Patient continues to demonstrate limitation  with  ROM, Joint mobility, Stiffness, Decreased fine motor coordination, Decreased gross motor coordination, Decreased functional use, Decreased strength and Continued pain. Pt presents with significant improvement in ROM and strength of R wrist and grasp/pinch as well as improved " "pain in comparison to previous tx sessions. Reported improved function with R hand at home, indicating continued improvement in self care tasks with significantly less pain. Pt continues to report intermittent difficulty with uncontrolled movements of B hands/fingers when performing various tasks at home. Pt reports fingers "lock up" or "straighten out and don't want to bend." Tolerating R wrist circumduction with hand in tripod position while in warm water. Pt reports improved wristing ability at home, educated to continue writing as part of HEP. Educated to continue gentle rotation in R wrist, thumb AROM including abduction, opposition, and extension, as well as continue use of yellow theraputty. Verbalized good understanding and carryover.     New/Revised Goals: Continue POC  1.  2.   3.   4.        Plan:  Continue 2x week x 4 weeks  during the certification period from  6/6/22 to 7/4/22  in pursuit of  OT goals.      Lea Murray, KATHRYNR/L, CSRS  7/6/2022      "

## 2022-07-11 DIAGNOSIS — R09.81 CONGESTION OF NASAL SINUS: ICD-10-CM

## 2022-07-11 DIAGNOSIS — M62.838 MUSCLE SPASM: ICD-10-CM

## 2022-07-11 DIAGNOSIS — E87.6 HYPOKALEMIA: ICD-10-CM

## 2022-07-11 DIAGNOSIS — R11.0 NAUSEA: ICD-10-CM

## 2022-07-11 RX ORDER — FLUTICASONE PROPIONATE 50 MCG
2 SPRAY, SUSPENSION (ML) NASAL DAILY PRN
Qty: 16 G | Refills: 0 | Status: SHIPPED | OUTPATIENT
Start: 2022-07-11 | End: 2022-08-08 | Stop reason: SDUPTHER

## 2022-07-11 RX ORDER — QUETIAPINE FUMARATE 100 MG/1
100-200 TABLET, FILM COATED ORAL NIGHTLY
Qty: 90 TABLET | Refills: 1 | Status: SHIPPED | OUTPATIENT
Start: 2022-07-11 | End: 2022-09-08

## 2022-07-11 RX ORDER — POTASSIUM CHLORIDE 600 MG/1
8 TABLET, FILM COATED, EXTENDED RELEASE ORAL DAILY
Qty: 90 TABLET | Refills: 1 | Status: SHIPPED | OUTPATIENT
Start: 2022-07-11 | End: 2023-01-05 | Stop reason: SDUPTHER

## 2022-07-11 RX ORDER — BACLOFEN 10 MG/1
10 TABLET ORAL 3 TIMES DAILY
Qty: 90 TABLET | Refills: 0 | Status: SHIPPED | OUTPATIENT
Start: 2022-07-11 | End: 2022-08-08 | Stop reason: SDUPTHER

## 2022-07-11 RX ORDER — PROMETHAZINE HYDROCHLORIDE 25 MG/1
25 TABLET ORAL EVERY 8 HOURS PRN
Qty: 30 TABLET | Refills: 0 | Status: SHIPPED | OUTPATIENT
Start: 2022-07-11 | End: 2022-08-08 | Stop reason: SDUPTHER

## 2022-07-11 NOTE — TELEPHONE ENCOUNTER
----- Message from Heather Triplett sent at 7/6/2022  2:03 PM CDT -----  Pt would like to get these medicines refilled     baclofen (LIORESAL) 10 MG tablet  dexlansoprazole (DEXILANT) 60 mg capsule  fluticasone propionate (FLONASE) 50 mcg/actuation nasal spray  potassium chloride (KLOR-CON) 8 MEQ TbSR  promethazine (PHENERGAN) 25 MG tablet  SEROQUEL 100 mg Tab

## 2022-07-11 NOTE — TELEPHONE ENCOUNTER
----- Message from Heather Triplett sent at 7/11/2022  1:24 PM CDT -----  Pt would like a call back about refills

## 2022-07-11 NOTE — TELEPHONE ENCOUNTER
Attempt to call patient no answer. Left vm to call back. Sent refill request to provider.  Rox Tnog LPN

## 2022-07-14 ENCOUNTER — TELEPHONE (OUTPATIENT)
Dept: FAMILY MEDICINE | Facility: CLINIC | Age: 52
End: 2022-07-14
Payer: MEDICARE

## 2022-07-14 NOTE — TELEPHONE ENCOUNTER
Call returned to pt. Pt stated that she started having Covid symptoms this past weekend. She is has a sore throat, productive cough, periodic episodes of shortness of breath. Pt has been advised to go to the ED since she is experiencing respiratory symptoms that are not improving. Pt verbalized understanding and will call back if she has any other questions or concerns.

## 2022-07-14 NOTE — TELEPHONE ENCOUNTER
----- Message from Luis Lou sent at 7/11/2022  3:20 PM CDT -----  Pt would like a call back about her refills and she said she tested positive from a home test and wanted to know what she needed to do.      Call back number    [phone]

## 2022-08-08 DIAGNOSIS — R09.81 CONGESTION OF NASAL SINUS: ICD-10-CM

## 2022-08-08 DIAGNOSIS — R11.0 NAUSEA: ICD-10-CM

## 2022-08-08 DIAGNOSIS — M62.838 MUSCLE SPASM: ICD-10-CM

## 2022-08-08 NOTE — TELEPHONE ENCOUNTER
----- Message from Edmond Cade sent at 8/8/2022 10:54 AM CDT -----      Patient called wanting a refill on the following prescriptions baclofen (LIORESAL) 10 MG tablet, fluticasone propionate, & promethazine 25mg tablet

## 2022-08-09 RX ORDER — PROMETHAZINE HYDROCHLORIDE 25 MG/1
25 TABLET ORAL EVERY 8 HOURS PRN
Qty: 30 TABLET | Refills: 0 | Status: SHIPPED | OUTPATIENT
Start: 2022-08-09 | End: 2022-09-08 | Stop reason: SDUPTHER

## 2022-08-09 RX ORDER — BACLOFEN 10 MG/1
10 TABLET ORAL 3 TIMES DAILY
Qty: 90 TABLET | Refills: 0 | Status: SHIPPED | OUTPATIENT
Start: 2022-08-09 | End: 2022-09-08 | Stop reason: SDUPTHER

## 2022-08-09 RX ORDER — FLUTICASONE PROPIONATE 50 MCG
2 SPRAY, SUSPENSION (ML) NASAL DAILY PRN
Qty: 16 G | Refills: 0 | Status: SHIPPED | OUTPATIENT
Start: 2022-08-09 | End: 2022-09-08 | Stop reason: SDUPTHER

## 2022-09-08 ENCOUNTER — OFFICE VISIT (OUTPATIENT)
Dept: FAMILY MEDICINE | Facility: CLINIC | Age: 52
End: 2022-09-08
Payer: MEDICARE

## 2022-09-08 ENCOUNTER — APPOINTMENT (OUTPATIENT)
Dept: RADIOLOGY | Facility: CLINIC | Age: 52
End: 2022-09-08
Attending: INTERNAL MEDICINE
Payer: MEDICARE

## 2022-09-08 VITALS
SYSTOLIC BLOOD PRESSURE: 140 MMHG | DIASTOLIC BLOOD PRESSURE: 92 MMHG | HEIGHT: 68 IN | WEIGHT: 250 LBS | TEMPERATURE: 96 F | BODY MASS INDEX: 37.89 KG/M2 | HEART RATE: 110 BPM | OXYGEN SATURATION: 97 % | RESPIRATION RATE: 12 BRPM

## 2022-09-08 DIAGNOSIS — J40 BRONCHITIS: Primary | ICD-10-CM

## 2022-09-08 DIAGNOSIS — M62.838 MUSCLE SPASM: ICD-10-CM

## 2022-09-08 DIAGNOSIS — R09.81 CONGESTION OF NASAL SINUS: ICD-10-CM

## 2022-09-08 DIAGNOSIS — J40 BRONCHITIS: ICD-10-CM

## 2022-09-08 DIAGNOSIS — R11.0 NAUSEA: ICD-10-CM

## 2022-09-08 PROCEDURE — 71046 X-RAY EXAM CHEST 2 VIEWS: CPT | Mod: TC,RHCUB | Performed by: INTERNAL MEDICINE

## 2022-09-08 PROCEDURE — 99214 PR OFFICE/OUTPT VISIT, EST, LEVL IV, 30-39 MIN: ICD-10-PCS | Mod: ,,, | Performed by: INTERNAL MEDICINE

## 2022-09-08 PROCEDURE — 99214 OFFICE O/P EST MOD 30 MIN: CPT | Mod: ,,, | Performed by: INTERNAL MEDICINE

## 2022-09-08 RX ORDER — PROMETHAZINE HYDROCHLORIDE 25 MG/1
25 TABLET ORAL EVERY 8 HOURS PRN
Qty: 30 TABLET | Refills: 0 | Status: SHIPPED | OUTPATIENT
Start: 2022-09-08 | End: 2022-10-05

## 2022-09-08 RX ORDER — CETIRIZINE HYDROCHLORIDE 10 MG/1
1 TABLET ORAL
COMMUNITY
End: 2022-11-03 | Stop reason: SDUPTHER

## 2022-09-08 RX ORDER — PROMETHAZINE HYDROCHLORIDE 25 MG/1
TABLET ORAL
COMMUNITY
End: 2022-10-05

## 2022-09-08 RX ORDER — PRAZOSIN HYDROCHLORIDE 1 MG/1
1 CAPSULE ORAL NIGHTLY
COMMUNITY
Start: 2022-08-08 | End: 2022-11-03

## 2022-09-08 RX ORDER — POTASSIUM CHLORIDE 600 MG/1
CAPSULE, EXTENDED RELEASE ORAL
COMMUNITY
End: 2022-11-03 | Stop reason: SDUPTHER

## 2022-09-08 RX ORDER — BACLOFEN 10 MG/1
10 TABLET ORAL 3 TIMES DAILY
Qty: 90 TABLET | Refills: 2 | Status: SHIPPED | OUTPATIENT
Start: 2022-09-08 | End: 2022-12-12 | Stop reason: SDUPTHER

## 2022-09-08 RX ORDER — FLUTICASONE PROPIONATE 50 MCG
2 SPRAY, SUSPENSION (ML) NASAL DAILY PRN
Qty: 16 G | Refills: 2 | Status: SHIPPED | OUTPATIENT
Start: 2022-09-08 | End: 2022-12-12 | Stop reason: SDUPTHER

## 2022-09-08 RX ORDER — AZITHROMYCIN 250 MG/1
TABLET, FILM COATED ORAL
Qty: 6 TABLET | Refills: 0 | Status: SHIPPED | OUTPATIENT
Start: 2022-09-08 | End: 2022-09-21

## 2022-09-08 RX ORDER — BENZONATATE 100 MG/1
100 CAPSULE ORAL 3 TIMES DAILY PRN
Qty: 90 CAPSULE | Refills: 1 | Status: SHIPPED | OUTPATIENT
Start: 2022-09-08 | End: 2022-09-18

## 2022-09-08 RX ORDER — MIRTAZAPINE 15 MG/1
15 TABLET, FILM COATED ORAL NIGHTLY
COMMUNITY
Start: 2022-08-08 | End: 2023-01-05 | Stop reason: SDUPTHER

## 2022-09-12 NOTE — PATIENT INSTRUCTIONS
Shayy was seen today for cough and medication refill.    Diagnoses and all orders for this visit:    Bronchitis  -     X-Ray Chest PA And Lateral; Future    Muscle spasm  -     baclofen (LIORESAL) 10 MG tablet; Take 1 tablet (10 mg total) by mouth 3 (three) times daily.    Congestion of nasal sinus  -     fluticasone propionate (FLONASE) 50 mcg/actuation nasal spray; 2 sprays (100 mcg total) by Each Nostril route daily as needed for Rhinitis.    Nausea  -     promethazine (PHENERGAN) 25 MG tablet; Take 1 tablet (25 mg total) by mouth every 8 (eight) hours as needed for Nausea.    Other orders  -     azithromycin (Z-GIANA) 250 MG tablet; Take 2 tablets by mouth on day 1; Take 1 tablet by mouth on days 2-5  -     benzonatate (TESSALON) 100 MG capsule; Take 1 capsule (100 mg total) by mouth 3 (three) times daily as needed for Cough.

## 2022-09-12 NOTE — PROGRESS NOTES
Subjective:       Patient ID: Shayy Hunter is a 51 y.o. female.    Chief Complaint: Cough (Cough persistent, coughing up mucus, (patient had covid)) and Medication Refill    Patient is here today for check up evaluation. Patient reports she recently had COVID a few months ago. States she still has a productive cough. Will order chest xray and Zpack. Will also order Tessalon Perles 100 mg PO TID prn cough. Will follow in 2 weeks.     Cough    Medication Refill  Associated symptoms include coughing.     Current Medications:    Current Outpatient Medications:     azithromycin (Z-GIANA) 250 MG tablet, Take 2 tablets by mouth on day 1; Take 1 tablet by mouth on days 2-5, Disp: 6 tablet, Rfl: 0    baclofen (LIORESAL) 10 MG tablet, Take 1 tablet (10 mg total) by mouth 3 (three) times daily., Disp: 90 tablet, Rfl: 2    benzonatate (TESSALON) 100 MG capsule, Take 1 capsule (100 mg total) by mouth 3 (three) times daily as needed for Cough., Disp: 90 capsule, Rfl: 1    cetirizine (ZYRTEC) 10 MG tablet, Take 1 tablet (10 mg total) by mouth once daily., Disp: 90 tablet, Rfl: 1    cetirizine (ZYRTEC) 10 MG tablet, 1 tablet., Disp: , Rfl:     dexlansoprazole (DEXILANT) 60 mg capsule, Take 1 capsule (60 mg total) by mouth once daily., Disp: 90 capsule, Rfl: 1    fluticasone propionate (FLONASE) 50 mcg/actuation nasal spray, 2 sprays (100 mcg total) by Each Nostril route daily as needed for Rhinitis., Disp: 16 g, Rfl: 2    furosemide (LASIX) 20 MG tablet, Take 1 tablet (20 mg total) by mouth once daily., Disp: 90 tablet, Rfl: 1    lamoTRIgine (LAMICTAL) 200 MG tablet, , Disp: , Rfl:     meloxicam (MOBIC) 15 MG tablet, Take 1 tablet (15 mg total) by mouth once daily., Disp: 30 tablet, Rfl: 3    MINIPRESS 1 mg Cap, Take 1 mg by mouth every evening., Disp: , Rfl:     mirtazapine (REMERON) 15 MG tablet, Take 15 mg by mouth every evening., Disp: , Rfl:     oxycodone-acetaminophen  mg (PERCOCET)  mg per tablet, Take 1 tablet by  mouth daily as needed., Disp: , Rfl:     potassium chloride (KLOR-CON) 8 MEQ TbSR, Take 1 tablet (8 mEq total) by mouth once daily., Disp: 90 tablet, Rfl: 1    potassium chloride (MICRO-K) 8 mEq CpSR, 2 capsules with food, Disp: , Rfl:     pregabalin (LYRICA) 150 MG capsule, Take 2 capsules by mouth once daily., Disp: , Rfl:     promethazine (PHENERGAN) 25 MG tablet, 1 tablet as needed, Disp: , Rfl:     promethazine (PHENERGAN) 25 MG tablet, Take 1 tablet (25 mg total) by mouth every 8 (eight) hours as needed for Nausea., Disp: 30 tablet, Rfl: 0    topiramate (TOPAMAX) 100 MG tablet, Take 100 mg by mouth every evening., Disp: , Rfl:     ziprasidone (GEODON) 60 MG Cap, Take 1 capsule by mouth once daily., Disp: , Rfl:     Current Facility-Administered Medications:     triamcinolone acetonide injection 80 mg, 80 mg, INTRABURSAL, 1 time in Clinic/HOD, Bertram Gibson MD    Last Labs:     No visits with results within 1 Month(s) from this visit.   Latest known visit with results is:   Office Visit on 02/08/2022   Component Date Value    SARS Coronavirus 2 Antig* 02/08/2022 Negative      Acceptab* 02/08/2022 Yes        Last Imaging:  X-Ray Chest PA And Lateral  Narrative: EXAMINATION:  XR CHEST PA AND LATERAL    CLINICAL HISTORY:  Bronchitis, not specified as acute or chronic    COMPARISON:  20 October 2021    FINDINGS:  The heart and mediastinum are normal in size and configuration.  The pulmonary vascularity is normal in caliber.  No lung infiltrates, effusions, pneumothorax or other abnormality is demonstrated.  Impression: No evidence of cardiopulmonary disease.    Electronically signed by: Sha Rios  Date:    09/08/2022  Time:    16:11         Review of Systems   Respiratory:  Positive for cough.    All other systems reviewed and are negative.      Objective:      Physical Exam  Vitals reviewed.   Constitutional:       Appearance: Normal appearance.   Cardiovascular:      Rate and Rhythm: Normal  rate and regular rhythm.      Pulses: Normal pulses.      Heart sounds: Normal heart sounds.   Pulmonary:      Effort: Pulmonary effort is normal.      Breath sounds: Normal breath sounds.   Abdominal:      General: Abdomen is flat. Bowel sounds are normal.      Palpations: Abdomen is soft.   Musculoskeletal:         General: Normal range of motion.      Cervical back: Normal range of motion and neck supple.   Skin:     General: Skin is warm and dry.   Neurological:      General: No focal deficit present.      Mental Status: She is alert and oriented to person, place, and time. Mental status is at baseline.       Assessment:       1. Bronchitis  X-Ray Chest PA And Lateral      2. Muscle spasm  baclofen (LIORESAL) 10 MG tablet      3. Congestion of nasal sinus  fluticasone propionate (FLONASE) 50 mcg/actuation nasal spray      4. Nausea  promethazine (PHENERGAN) 25 MG tablet           Plan:         Shayy was seen today for cough and medication refill.    Diagnoses and all orders for this visit:    Bronchitis  -     X-Ray Chest PA And Lateral; Future    Muscle spasm  -     baclofen (LIORESAL) 10 MG tablet; Take 1 tablet (10 mg total) by mouth 3 (three) times daily.    Congestion of nasal sinus  -     fluticasone propionate (FLONASE) 50 mcg/actuation nasal spray; 2 sprays (100 mcg total) by Each Nostril route daily as needed for Rhinitis.    Nausea  -     promethazine (PHENERGAN) 25 MG tablet; Take 1 tablet (25 mg total) by mouth every 8 (eight) hours as needed for Nausea.    Other orders  -     azithromycin (Z-GIANA) 250 MG tablet; Take 2 tablets by mouth on day 1; Take 1 tablet by mouth on days 2-5  -     benzonatate (TESSALON) 100 MG capsule; Take 1 capsule (100 mg total) by mouth 3 (three) times daily as needed for Cough.

## 2022-09-21 ENCOUNTER — OFFICE VISIT (OUTPATIENT)
Dept: FAMILY MEDICINE | Facility: CLINIC | Age: 52
End: 2022-09-21
Payer: MEDICARE

## 2022-09-21 VITALS
OXYGEN SATURATION: 99 % | SYSTOLIC BLOOD PRESSURE: 140 MMHG | WEIGHT: 246.38 LBS | RESPIRATION RATE: 20 BRPM | HEART RATE: 102 BPM | BODY MASS INDEX: 37.34 KG/M2 | TEMPERATURE: 98 F | HEIGHT: 68 IN | DIASTOLIC BLOOD PRESSURE: 86 MMHG

## 2022-09-21 DIAGNOSIS — J30.9 ALLERGIC RHINITIS, UNSPECIFIED SEASONALITY, UNSPECIFIED TRIGGER: ICD-10-CM

## 2022-09-21 DIAGNOSIS — K21.9 GASTROESOPHAGEAL REFLUX DISEASE, UNSPECIFIED WHETHER ESOPHAGITIS PRESENT: Primary | ICD-10-CM

## 2022-09-21 DIAGNOSIS — R05.3 CHRONIC COUGH: ICD-10-CM

## 2022-09-21 DIAGNOSIS — J06.9 UPPER RESPIRATORY TRACT INFECTION, UNSPECIFIED TYPE: ICD-10-CM

## 2022-09-21 PROCEDURE — 99214 PR OFFICE/OUTPT VISIT, EST, LEVL IV, 30-39 MIN: ICD-10-PCS | Mod: ,,, | Performed by: INTERNAL MEDICINE

## 2022-09-21 PROCEDURE — 99214 OFFICE O/P EST MOD 30 MIN: CPT | Mod: ,,, | Performed by: INTERNAL MEDICINE

## 2022-09-21 RX ORDER — BENZONATATE 100 MG/1
100 CAPSULE ORAL 3 TIMES DAILY
Qty: 15 CAPSULE | Refills: 2 | Status: SHIPPED | OUTPATIENT
Start: 2022-09-21 | End: 2022-11-03 | Stop reason: SDUPTHER

## 2022-09-21 RX ORDER — AZITHROMYCIN 250 MG/1
TABLET, FILM COATED ORAL
Qty: 6 TABLET | Refills: 0 | Status: SHIPPED | OUTPATIENT
Start: 2022-09-21 | End: 2022-09-26

## 2022-09-21 NOTE — PATIENT INSTRUCTIONS
Shayy was seen today for follow-up and cough.    Diagnoses and all orders for this visit:    Gastroesophageal reflux disease, unspecified whether esophagitis present    Allergic rhinitis, unspecified seasonality, unspecified trigger    Chronic cough    Upper respiratory tract infection, unspecified type    Other orders  The following orders have not been finalized:  -     azithromycin (Z-GIANA) 250 MG tablet  -     benzonatate (TESSALON) 100 MG capsule

## 2022-09-21 NOTE — PROGRESS NOTES
Subjective:       Patient ID: Shayy Hunter is a 51 y.o. female.    Chief Complaint: Follow-up and Cough (Says cough persistent, wasn't able to get tessalon perles)    Patient is here today for a follow up evaluation. Patient blood pressure is slightly increased today on intake, 140/86. Patient has a complaint of constant cough. Patient states she does not cough up phlegm. Recent chest x-ray reviewed, results show no abnormalities. Will order Tessalon Pearls 100mg PO TID #15 RF2. Will order Z-pack. Will follow in 2 months.     Current Medications:    Current Outpatient Medications:     baclofen (LIORESAL) 10 MG tablet, Take 1 tablet (10 mg total) by mouth 3 (three) times daily., Disp: 90 tablet, Rfl: 2    cetirizine (ZYRTEC) 10 MG tablet, Take 1 tablet (10 mg total) by mouth once daily., Disp: 90 tablet, Rfl: 1    cetirizine (ZYRTEC) 10 MG tablet, 1 tablet., Disp: , Rfl:     dexlansoprazole (DEXILANT) 60 mg capsule, Take 1 capsule (60 mg total) by mouth once daily., Disp: 90 capsule, Rfl: 1    fluticasone propionate (FLONASE) 50 mcg/actuation nasal spray, 2 sprays (100 mcg total) by Each Nostril route daily as needed for Rhinitis., Disp: 16 g, Rfl: 2    furosemide (LASIX) 20 MG tablet, Take 1 tablet (20 mg total) by mouth once daily., Disp: 90 tablet, Rfl: 1    lamoTRIgine (LAMICTAL) 200 MG tablet, , Disp: , Rfl:     meloxicam (MOBIC) 15 MG tablet, Take 1 tablet (15 mg total) by mouth once daily., Disp: 30 tablet, Rfl: 3    MINIPRESS 1 mg Cap, Take 1 mg by mouth every evening., Disp: , Rfl:     mirtazapine (REMERON) 15 MG tablet, Take 15 mg by mouth every evening., Disp: , Rfl:     oxycodone-acetaminophen  mg (PERCOCET)  mg per tablet, Take 1 tablet by mouth daily as needed., Disp: , Rfl:     potassium chloride (KLOR-CON) 8 MEQ TbSR, Take 1 tablet (8 mEq total) by mouth once daily., Disp: 90 tablet, Rfl: 1    potassium chloride (MICRO-K) 8 mEq CpSR, 2 capsules with food, Disp: , Rfl:     pregabalin  (LYRICA) 150 MG capsule, Take 2 capsules by mouth once daily., Disp: , Rfl:     promethazine (PHENERGAN) 25 MG tablet, 1 tablet as needed, Disp: , Rfl:     promethazine (PHENERGAN) 25 MG tablet, Take 1 tablet (25 mg total) by mouth every 8 (eight) hours as needed for Nausea., Disp: 30 tablet, Rfl: 0    topiramate (TOPAMAX) 100 MG tablet, Take 100 mg by mouth every evening., Disp: , Rfl:     ziprasidone (GEODON) 60 MG Cap, Take 1 capsule by mouth once daily., Disp: , Rfl:     Current Facility-Administered Medications:     triamcinolone acetonide injection 80 mg, 80 mg, INTRABURSAL, 1 time in Clinic/HOD, Bertram Gibson MD    Last Labs:     No visits with results within 1 Month(s) from this visit.   Latest known visit with results is:   Office Visit on 02/08/2022   Component Date Value    SARS Coronavirus 2 Antig* 02/08/2022 Negative      Acceptab* 02/08/2022 Yes        Last Imaging:  X-Ray Chest PA And Lateral  Narrative: EXAMINATION:  XR CHEST PA AND LATERAL    CLINICAL HISTORY:  Bronchitis, not specified as acute or chronic    COMPARISON:  20 October 2021    FINDINGS:  The heart and mediastinum are normal in size and configuration.  The pulmonary vascularity is normal in caliber.  No lung infiltrates, effusions, pneumothorax or other abnormality is demonstrated.  Impression: No evidence of cardiopulmonary disease.    Electronically signed by: Sha Rios  Date:    09/08/2022  Time:    16:11         Review of Systems   Respiratory:  Positive for cough.    All other systems reviewed and are negative.      Objective:      Physical Exam  Vitals reviewed.   Constitutional:       Appearance: Normal appearance. She is normal weight.   Cardiovascular:      Rate and Rhythm: Normal rate and regular rhythm.      Pulses: Normal pulses.      Heart sounds: Normal heart sounds.   Pulmonary:      Effort: Pulmonary effort is normal.      Breath sounds: Normal breath sounds.   Abdominal:      General: Abdomen is  flat. Bowel sounds are normal.      Palpations: Abdomen is soft.   Musculoskeletal:         General: Normal range of motion.      Cervical back: Normal range of motion and neck supple.   Skin:     General: Skin is warm and dry.   Neurological:      General: No focal deficit present.      Mental Status: She is alert and oriented to person, place, and time. Mental status is at baseline.       Assessment:       1. Gastroesophageal reflux disease, unspecified whether esophagitis present        2. Allergic rhinitis, unspecified seasonality, unspecified trigger        3. Chronic cough        4. Upper respiratory tract infection, unspecified type             Plan:         Shayy was seen today for follow-up and cough.    Diagnoses and all orders for this visit:    Gastroesophageal reflux disease, unspecified whether esophagitis present    Allergic rhinitis, unspecified seasonality, unspecified trigger    Chronic cough    Upper respiratory tract infection, unspecified type    Other orders  The following orders have not been finalized:  -     azithromycin (Z-GIANA) 250 MG tablet  -     benzonatate (TESSALON) 100 MG capsule

## 2022-10-05 DIAGNOSIS — R11.0 NAUSEA: ICD-10-CM

## 2022-10-05 NOTE — TELEPHONE ENCOUNTER
----- Message from Luis Lou sent at 10/5/2022 11:46 AM CDT -----  Pt called and asked for a refill on    meloxicam (MOBIC) 15 MG tablet    promethazine (PHENERGAN) 25 MG tablet    Call back number [phone]

## 2022-10-06 RX ORDER — PROMETHAZINE HYDROCHLORIDE 25 MG/1
25 TABLET ORAL EVERY 8 HOURS PRN
Qty: 30 TABLET | Refills: 0 | Status: SHIPPED | OUTPATIENT
Start: 2022-10-06 | End: 2022-11-03 | Stop reason: SDUPTHER

## 2022-10-06 RX ORDER — MELOXICAM 15 MG/1
15 TABLET ORAL DAILY
Qty: 30 TABLET | Refills: 1 | Status: SHIPPED | OUTPATIENT
Start: 2022-10-06 | End: 2022-12-12 | Stop reason: SDUPTHER

## 2022-10-09 DIAGNOSIS — Z71.89 COMPLEX CARE COORDINATION: ICD-10-CM

## 2022-11-03 ENCOUNTER — OFFICE VISIT (OUTPATIENT)
Dept: FAMILY MEDICINE | Facility: CLINIC | Age: 52
End: 2022-11-03
Payer: MEDICARE

## 2022-11-03 VITALS
HEART RATE: 96 BPM | RESPIRATION RATE: 18 BRPM | OXYGEN SATURATION: 99 % | TEMPERATURE: 98 F | SYSTOLIC BLOOD PRESSURE: 128 MMHG | WEIGHT: 244 LBS | HEIGHT: 68 IN | DIASTOLIC BLOOD PRESSURE: 82 MMHG | BODY MASS INDEX: 36.98 KG/M2

## 2022-11-03 DIAGNOSIS — J06.9 UPPER RESPIRATORY TRACT INFECTION, UNSPECIFIED TYPE: ICD-10-CM

## 2022-11-03 DIAGNOSIS — R11.0 NAUSEA: ICD-10-CM

## 2022-11-03 DIAGNOSIS — R51.9 SINUS HEADACHE: Primary | ICD-10-CM

## 2022-11-03 DIAGNOSIS — J30.89 SEASONAL ALLERGIC RHINITIS DUE TO OTHER ALLERGIC TRIGGER: ICD-10-CM

## 2022-11-03 DIAGNOSIS — H04.203 WATERY EYES: ICD-10-CM

## 2022-11-03 LAB
CTP QC/QA: YES
CTP QC/QA: YES
FLUAV AG NPH QL: NEGATIVE
FLUBV AG NPH QL: NEGATIVE
S PYO RRNA THROAT QL PROBE: NEGATIVE
SARS-COV-2 AG RESP QL IA.RAPID: NEGATIVE

## 2022-11-03 PROCEDURE — 99214 OFFICE O/P EST MOD 30 MIN: CPT | Mod: ,,, | Performed by: INTERNAL MEDICINE

## 2022-11-03 PROCEDURE — 96372 PR INJECTION,THERAP/PROPH/DIAG2ST, IM OR SUBCUT: ICD-10-PCS | Mod: ,,, | Performed by: INTERNAL MEDICINE

## 2022-11-03 PROCEDURE — 87428 SARSCOV & INF VIR A&B AG IA: CPT | Mod: RHCUB | Performed by: INTERNAL MEDICINE

## 2022-11-03 PROCEDURE — 99214 PR OFFICE/OUTPT VISIT, EST, LEVL IV, 30-39 MIN: ICD-10-PCS | Mod: ,,, | Performed by: INTERNAL MEDICINE

## 2022-11-03 PROCEDURE — 87880 STREP A ASSAY W/OPTIC: CPT | Mod: RHCUB | Performed by: INTERNAL MEDICINE

## 2022-11-03 PROCEDURE — 96372 THER/PROPH/DIAG INJ SC/IM: CPT | Mod: ,,, | Performed by: INTERNAL MEDICINE

## 2022-11-03 RX ORDER — DEXAMETHASONE SODIUM PHOSPHATE 4 MG/ML
1 INJECTION, SOLUTION INTRA-ARTICULAR; INTRALESIONAL; INTRAMUSCULAR; INTRAVENOUS; SOFT TISSUE
Status: COMPLETED | OUTPATIENT
Start: 2022-11-03 | End: 2022-11-03

## 2022-11-03 RX ORDER — PROMETHAZINE HYDROCHLORIDE 25 MG/1
25 TABLET ORAL EVERY 8 HOURS PRN
Qty: 30 TABLET | Refills: 0 | Status: SHIPPED | OUTPATIENT
Start: 2022-11-03 | End: 2022-12-12 | Stop reason: SDUPTHER

## 2022-11-03 RX ORDER — BENZONATATE 100 MG/1
100 CAPSULE ORAL 3 TIMES DAILY
Qty: 20 CAPSULE | Refills: 2 | Status: SHIPPED | OUTPATIENT
Start: 2022-11-03 | End: 2023-06-20

## 2022-11-03 RX ORDER — TOPIRAMATE 200 MG/1
1 TABLET ORAL 2 TIMES DAILY
COMMUNITY
End: 2023-01-05 | Stop reason: SDUPTHER

## 2022-11-03 RX ADMIN — DEXAMETHASONE SODIUM PHOSPHATE 1 MG: 4 INJECTION, SOLUTION INTRA-ARTICULAR; INTRALESIONAL; INTRAMUSCULAR; INTRAVENOUS; SOFT TISSUE at 02:11

## 2022-11-03 NOTE — PATIENT INSTRUCTIONS
Shayy was seen today for cough, sinus problem, medication refill and depression.    Diagnoses and all orders for this visit:    Sinus headache  -     POCT rapid strep A  -     POCT SARS-COV2 (COVID) with Flu Antigen    Nausea  -     POCT SARS-COV2 (COVID) with Flu Antigen  The following orders have not been finalized:  -     promethazine (PHENERGAN) 25 MG tablet    Seasonal allergic rhinitis due to other allergic trigger  -     POCT rapid strep A    Watery eyes    Upper respiratory tract infection, unspecified type  -     POCT rapid strep A  -     POCT SARS-COV2 (COVID) with Flu Antigen  -     X-Ray Sinuses 3 or more views; Future  -     dexAMETHasone injection 1 mg    Other orders  The following orders have not been finalized:  -     benzonatate (TESSALON) 100 MG capsule

## 2022-11-03 NOTE — PROGRESS NOTES
Subjective:       Patient ID: Shayy Hunter is a 52 y.o. female.    Chief Complaint: Cough, Sinus Problem, Medication Refill, and Depression    Patient is here today for check up evaluation. Patient complaining of sinus drainage and headache and watery eyes. States takes Flonase and Zyrtec daily. Not helping much. Will give Decadron 1 mg IM now. Will check for COVID, Flu, and Strep. Will xray sinuses as well. Will follow in 3 months.     Current Medications:    Current Outpatient Medications:     baclofen (LIORESAL) 10 MG tablet, Take 1 tablet (10 mg total) by mouth 3 (three) times daily., Disp: 90 tablet, Rfl: 2    benzonatate (TESSALON) 100 MG capsule, Take 1 capsule (100 mg total) by mouth 3 (three) times daily., Disp: 15 capsule, Rfl: 2    cetirizine (ZYRTEC) 10 MG tablet, Take 1 tablet (10 mg total) by mouth once daily., Disp: 90 tablet, Rfl: 1    dexlansoprazole (DEXILANT) 60 mg capsule, Take 1 capsule (60 mg total) by mouth once daily., Disp: 90 capsule, Rfl: 1    fluticasone propionate (FLONASE) 50 mcg/actuation nasal spray, 2 sprays (100 mcg total) by Each Nostril route daily as needed for Rhinitis., Disp: 16 g, Rfl: 2    furosemide (LASIX) 20 MG tablet, Take 1 tablet (20 mg total) by mouth once daily., Disp: 90 tablet, Rfl: 1    lamoTRIgine (LAMICTAL) 200 MG tablet, , Disp: , Rfl:     meloxicam (MOBIC) 15 MG tablet, Take 1 tablet (15 mg total) by mouth once daily., Disp: 30 tablet, Rfl: 1    mirtazapine (REMERON) 15 MG tablet, Take 15 mg by mouth every evening., Disp: , Rfl:     oxycodone-acetaminophen  mg (PERCOCET)  mg per tablet, Take 1 tablet by mouth daily as needed., Disp: , Rfl:     potassium chloride (KLOR-CON) 8 MEQ TbSR, Take 1 tablet (8 mEq total) by mouth once daily., Disp: 90 tablet, Rfl: 1    pregabalin (LYRICA) 150 MG capsule, Take 2 capsules by mouth once daily., Disp: , Rfl:     promethazine (PHENERGAN) 25 MG tablet, Take 1 tablet (25 mg total) by mouth every 8 (eight) hours as  needed for Nausea., Disp: 30 tablet, Rfl: 0    topiramate (TOPAMAX) 200 MG Tab, Take 1 tablet by mouth 2 (two) times a day., Disp: , Rfl:     ziprasidone (GEODON) 60 MG Cap, Take 1 capsule by mouth once daily., Disp: , Rfl:   No current facility-administered medications for this visit.    Last Labs:     No visits with results within 1 Month(s) from this visit.   Latest known visit with results is:   Office Visit on 02/08/2022   Component Date Value    SARS Coronavirus 2 Antig* 02/08/2022 Negative      Acceptab* 02/08/2022 Yes        Last Imaging:  X-Ray Sinuses 3 or more views  Narrative: EXAMINATION:  XR SINUSES MIN 3 VIEWS    CLINICAL HISTORY:  Acute upper respiratory infection, unspecified    COMPARISON:  None    TECHNIQUE:  Three views of the sinuses.    FINDINGS:  Suspect moderate mucosal thickening involving the inferolateral left maxillary sinus.  No convincing air-fluid level to suggest acute sinusitis.  Consider CT for further evaluation.  Anterior fusion hardware noted at C4-5 with osseous fusion.  This is only partially visualized.  Impression: Findings as detailed above.    Point of Service: [facility]    Electronically signed by: Tyrone Asher  Date:    11/03/2022  Time:    15:04         Review of Systems   HENT:  Positive for nasal congestion, postnasal drip and sinus pressure/congestion.    Neurological:  Positive for headaches.   All other systems reviewed and are negative.      Objective:      Physical Exam  Vitals reviewed.   Constitutional:       Appearance: Normal appearance.   Cardiovascular:      Rate and Rhythm: Normal rate and regular rhythm.      Pulses: Normal pulses.      Heart sounds: Normal heart sounds.   Pulmonary:      Effort: Pulmonary effort is normal.      Breath sounds: Normal breath sounds.   Abdominal:      General: Abdomen is flat. Bowel sounds are normal.      Palpations: Abdomen is soft.   Musculoskeletal:         General: Normal range of motion.       Cervical back: Normal range of motion and neck supple.   Skin:     General: Skin is warm and dry.   Neurological:      General: No focal deficit present.      Mental Status: She is alert and oriented to person, place, and time. Mental status is at baseline.       Assessment:       1. Sinus headache  POCT rapid strep A    POCT SARS-COV2 (COVID) with Flu Antigen      2. Nausea  POCT SARS-COV2 (COVID) with Flu Antigen      3. Seasonal allergic rhinitis due to other allergic trigger  POCT rapid strep A      4. Watery eyes        5. Upper respiratory tract infection, unspecified type  POCT rapid strep A    POCT SARS-COV2 (COVID) with Flu Antigen    X-Ray Sinuses 3 or more views    dexAMETHasone injection 1 mg           Plan:         Shayy was seen today for cough, sinus problem, medication refill and depression.    Diagnoses and all orders for this visit:    Sinus headache  -     POCT rapid strep A  -     POCT SARS-COV2 (COVID) with Flu Antigen    Nausea  -     POCT SARS-COV2 (COVID) with Flu Antigen  The following orders have not been finalized:  -     promethazine (PHENERGAN) 25 MG tablet    Seasonal allergic rhinitis due to other allergic trigger  -     POCT rapid strep A    Watery eyes    Upper respiratory tract infection, unspecified type  -     POCT rapid strep A  -     POCT SARS-COV2 (COVID) with Flu Antigen  -     X-Ray Sinuses 3 or more views; Future  -     dexAMETHasone injection 1 mg    Other orders  The following orders have not been finalized:  -     benzonatate (TESSALON) 100 MG capsule

## 2022-12-07 DIAGNOSIS — R11.0 NAUSEA: ICD-10-CM

## 2022-12-07 DIAGNOSIS — T78.40XD ALLERGY, SUBSEQUENT ENCOUNTER: ICD-10-CM

## 2022-12-07 DIAGNOSIS — R09.81 CONGESTION OF NASAL SINUS: ICD-10-CM

## 2022-12-07 DIAGNOSIS — M62.838 MUSCLE SPASM: ICD-10-CM

## 2022-12-07 RX ORDER — CETIRIZINE HYDROCHLORIDE 10 MG/1
10 TABLET ORAL DAILY
Qty: 90 TABLET | Refills: 1 | Status: CANCELLED | OUTPATIENT
Start: 2022-12-07

## 2022-12-07 RX ORDER — MELOXICAM 15 MG/1
15 TABLET ORAL DAILY
Qty: 30 TABLET | Refills: 1 | Status: CANCELLED | OUTPATIENT
Start: 2022-12-07

## 2022-12-07 RX ORDER — FLUTICASONE PROPIONATE 50 MCG
2 SPRAY, SUSPENSION (ML) NASAL DAILY PRN
Qty: 16 G | Refills: 2 | Status: CANCELLED | OUTPATIENT
Start: 2022-12-07

## 2022-12-07 RX ORDER — BACLOFEN 10 MG/1
10 TABLET ORAL 3 TIMES DAILY
Qty: 90 TABLET | Refills: 2 | Status: CANCELLED | OUTPATIENT
Start: 2022-12-07

## 2022-12-07 RX ORDER — PROMETHAZINE HYDROCHLORIDE 25 MG/1
25 TABLET ORAL EVERY 8 HOURS PRN
Qty: 30 TABLET | Refills: 0 | Status: CANCELLED | OUTPATIENT
Start: 2022-12-07

## 2022-12-07 NOTE — TELEPHONE ENCOUNTER
----- Message from Dianna Patel sent at 12/6/2022  9:40 AM CST -----  Patient is calling and asking for a refill on the following medications    baclofen (LIORESAL) 10 MG tablet 90 tablet  promethazine (PHENERGAN) 25 MG tablet 30 tablet  fluticasone propionate (FLONASE) 50 mcg/actuation nasal spray  cetirizine (ZYRTEC) 10 MG tablet 90 tablet  meloxicam (MOBIC) 15 MG tablet 30 tablet     Patient call back - [phone]- call made to pt regarding above message; pt stated she has no refills left on these meds. pt stated that she fells nauseated everyday and that's the reason for the refill request for the phenergan; informed pt that if she continues to feel that way to come in and see Kelsey Goerges NP as Dr. Brady is out of office or to go to other facilities of preference for the constant nausea. Pt voiced understanding; no other questions asked at this time.

## 2022-12-12 ENCOUNTER — TELEPHONE (OUTPATIENT)
Dept: FAMILY MEDICINE | Facility: CLINIC | Age: 52
End: 2022-12-12
Payer: MEDICARE

## 2022-12-12 DIAGNOSIS — R11.0 NAUSEA: ICD-10-CM

## 2022-12-12 DIAGNOSIS — R09.81 CONGESTION OF NASAL SINUS: ICD-10-CM

## 2022-12-12 DIAGNOSIS — T78.40XD ALLERGY, SUBSEQUENT ENCOUNTER: ICD-10-CM

## 2022-12-12 DIAGNOSIS — M62.838 MUSCLE SPASM: ICD-10-CM

## 2022-12-12 RX ORDER — CETIRIZINE HYDROCHLORIDE 10 MG/1
10 TABLET ORAL DAILY
Qty: 30 TABLET | Refills: 0 | Status: SHIPPED | OUTPATIENT
Start: 2022-12-12 | End: 2023-01-12 | Stop reason: SDUPTHER

## 2022-12-12 RX ORDER — FLUTICASONE PROPIONATE 50 MCG
2 SPRAY, SUSPENSION (ML) NASAL DAILY PRN
Qty: 16 G | Refills: 0 | Status: SHIPPED | OUTPATIENT
Start: 2022-12-12 | End: 2023-01-12 | Stop reason: SDUPTHER

## 2022-12-12 RX ORDER — BACLOFEN 10 MG/1
10 TABLET ORAL 3 TIMES DAILY
Qty: 90 TABLET | Refills: 0 | Status: SHIPPED | OUTPATIENT
Start: 2022-12-12 | End: 2023-01-12 | Stop reason: SDUPTHER

## 2022-12-12 RX ORDER — MELOXICAM 15 MG/1
15 TABLET ORAL DAILY
Qty: 30 TABLET | Refills: 0 | Status: SHIPPED | OUTPATIENT
Start: 2022-12-12 | End: 2023-01-12 | Stop reason: SDUPTHER

## 2022-12-12 RX ORDER — PROMETHAZINE HYDROCHLORIDE 25 MG/1
25 TABLET ORAL EVERY 8 HOURS PRN
Qty: 30 TABLET | Refills: 0 | Status: SHIPPED | OUTPATIENT
Start: 2022-12-12 | End: 2023-01-05 | Stop reason: SDUPTHER

## 2022-12-12 NOTE — TELEPHONE ENCOUNTER
[phone]- call made to pt regarding med refill; stated she is completely out; spoke with Kelsey Georges NP and she stated to pend them to her and she would take a look at them and then call pt back if need to be. Relayed message to pt; pt voiced understanding. No other questions asked at this time.

## 2022-12-13 NOTE — TELEPHONE ENCOUNTER
----- Message from Luis Lou sent at 12/13/2022 10:14 AM CST -----  Pt called and asked for a call back about her medicine   [phone]- call made to pt regarding above message; pt was asking if her meds have been sent to pharmacy yet and I informed her that they were sent yesterday. Pt thanked me for the call; no other questions asked at this time.

## 2023-01-05 ENCOUNTER — OFFICE VISIT (OUTPATIENT)
Dept: FAMILY MEDICINE | Facility: CLINIC | Age: 53
End: 2023-01-05
Payer: MEDICARE

## 2023-01-05 VITALS
WEIGHT: 234 LBS | HEART RATE: 92 BPM | RESPIRATION RATE: 18 BRPM | DIASTOLIC BLOOD PRESSURE: 83 MMHG | SYSTOLIC BLOOD PRESSURE: 117 MMHG | BODY MASS INDEX: 35.46 KG/M2 | OXYGEN SATURATION: 99 % | HEIGHT: 68 IN | TEMPERATURE: 98 F

## 2023-01-05 DIAGNOSIS — F33.0 MILD EPISODE OF RECURRENT MAJOR DEPRESSIVE DISORDER: ICD-10-CM

## 2023-01-05 DIAGNOSIS — Z12.11 ENCOUNTER FOR SCREENING COLONOSCOPY: ICD-10-CM

## 2023-01-05 DIAGNOSIS — E87.6 HYPOKALEMIA: ICD-10-CM

## 2023-01-05 DIAGNOSIS — Z11.59 NEED FOR HEPATITIS C SCREENING TEST: ICD-10-CM

## 2023-01-05 DIAGNOSIS — J30.89 SEASONAL ALLERGIC RHINITIS DUE TO OTHER ALLERGIC TRIGGER: Primary | ICD-10-CM

## 2023-01-05 DIAGNOSIS — Z23 ENCOUNTER FOR IMMUNIZATION: ICD-10-CM

## 2023-01-05 DIAGNOSIS — Z13.220 SCREENING, LIPID: ICD-10-CM

## 2023-01-05 DIAGNOSIS — J32.9 SINUSITIS, UNSPECIFIED CHRONICITY, UNSPECIFIED LOCATION: ICD-10-CM

## 2023-01-05 DIAGNOSIS — R11.0 NAUSEA: ICD-10-CM

## 2023-01-05 DIAGNOSIS — Z12.31 ENCOUNTER FOR SCREENING MAMMOGRAM FOR MALIGNANT NEOPLASM OF BREAST: ICD-10-CM

## 2023-01-05 DIAGNOSIS — R60.9 EDEMA, UNSPECIFIED TYPE: ICD-10-CM

## 2023-01-05 DIAGNOSIS — R51.9 SINUS HEADACHE: ICD-10-CM

## 2023-01-05 DIAGNOSIS — E66.8 OTHER OBESITY: ICD-10-CM

## 2023-01-05 DIAGNOSIS — M79.7 FIBROMYALGIA: ICD-10-CM

## 2023-01-05 DIAGNOSIS — J01.11 ACUTE RECURRENT FRONTAL SINUSITIS: ICD-10-CM

## 2023-01-05 DIAGNOSIS — Z13.1 SCREENING FOR DIABETES MELLITUS: ICD-10-CM

## 2023-01-05 LAB
ANION GAP SERPL CALCULATED.3IONS-SCNC: 9 MMOL/L (ref 7–16)
BUN SERPL-MCNC: 9 MG/DL (ref 7–18)
BUN/CREAT SERPL: 12 (ref 6–20)
CALCIUM SERPL-MCNC: 8.6 MG/DL (ref 8.5–10.1)
CHLORIDE SERPL-SCNC: 107 MMOL/L (ref 98–107)
CHOLEST SERPL-MCNC: 163 MG/DL (ref 0–200)
CHOLEST/HDLC SERPL: 3.5 {RATIO}
CO2 SERPL-SCNC: 27 MMOL/L (ref 21–32)
CREAT SERPL-MCNC: 0.75 MG/DL (ref 0.55–1.02)
EGFR (NO RACE VARIABLE) (RUSH/TITUS): 97 ML/MIN/1.73M²
GLUCOSE SERPL-MCNC: 111 MG/DL (ref 74–106)
HCV AB SER QL: NORMAL
HDLC SERPL-MCNC: 46 MG/DL (ref 40–60)
LDLC SERPL CALC-MCNC: 85 MG/DL
LDLC/HDLC SERPL: 1.8 {RATIO}
NONHDLC SERPL-MCNC: 117 MG/DL
POTASSIUM SERPL-SCNC: 4.2 MMOL/L (ref 3.5–5.1)
SODIUM SERPL-SCNC: 139 MMOL/L (ref 136–145)
TRIGL SERPL-MCNC: 160 MG/DL (ref 35–150)
VLDLC SERPL-MCNC: 32 MG/DL

## 2023-01-05 PROCEDURE — 99214 OFFICE O/P EST MOD 30 MIN: CPT | Mod: ,,, | Performed by: INTERNAL MEDICINE

## 2023-01-05 PROCEDURE — 99214 PR OFFICE/OUTPT VISIT, EST, LEVL IV, 30-39 MIN: ICD-10-PCS | Mod: ,,, | Performed by: INTERNAL MEDICINE

## 2023-01-05 PROCEDURE — 80048 BASIC METABOLIC PANEL: ICD-10-PCS | Mod: ,,, | Performed by: CLINICAL MEDICAL LABORATORY

## 2023-01-05 PROCEDURE — G0008 FLU VACCINE (QUAD) GREATER THAN OR EQUAL TO 3YO PRESERVATIVE FREE IM: ICD-10-PCS | Mod: ,,, | Performed by: INTERNAL MEDICINE

## 2023-01-05 PROCEDURE — 90686 FLU VACCINE (QUAD) GREATER THAN OR EQUAL TO 3YO PRESERVATIVE FREE IM: ICD-10-PCS | Mod: ,,, | Performed by: INTERNAL MEDICINE

## 2023-01-05 PROCEDURE — 80061 LIPID PANEL: ICD-10-PCS | Mod: ,,, | Performed by: CLINICAL MEDICAL LABORATORY

## 2023-01-05 PROCEDURE — 80061 LIPID PANEL: CPT | Mod: ,,, | Performed by: CLINICAL MEDICAL LABORATORY

## 2023-01-05 PROCEDURE — 86803 HEPATITIS C ANTIBODY: ICD-10-PCS | Mod: ,,, | Performed by: CLINICAL MEDICAL LABORATORY

## 2023-01-05 PROCEDURE — 80048 BASIC METABOLIC PNL TOTAL CA: CPT | Mod: ,,, | Performed by: CLINICAL MEDICAL LABORATORY

## 2023-01-05 PROCEDURE — G0008 ADMIN INFLUENZA VIRUS VAC: HCPCS | Mod: ,,, | Performed by: INTERNAL MEDICINE

## 2023-01-05 PROCEDURE — 86803 HEPATITIS C AB TEST: CPT | Mod: ,,, | Performed by: CLINICAL MEDICAL LABORATORY

## 2023-01-05 PROCEDURE — 90686 IIV4 VACC NO PRSV 0.5 ML IM: CPT | Mod: ,,, | Performed by: INTERNAL MEDICINE

## 2023-01-05 NOTE — PROGRESS NOTES
Pt stated that she has received a flu vaccine before and had no allergic reactions. Pt is unable to recall having a Tdap vaccine and doesn't know if she is allergic to it. Flu vaccine to be given. Tdap will not be given.

## 2023-01-06 DIAGNOSIS — Z12.11 COLON CANCER SCREENING: Primary | ICD-10-CM

## 2023-01-06 RX ORDER — LEVOFLOXACIN 500 MG/1
500 TABLET, FILM COATED ORAL DAILY
Qty: 14 TABLET | Refills: 0 | Status: SHIPPED | OUTPATIENT
Start: 2023-01-06 | End: 2023-01-20

## 2023-01-06 RX ORDER — PROMETHAZINE HYDROCHLORIDE 25 MG/1
25 TABLET ORAL EVERY 8 HOURS PRN
Qty: 30 TABLET | Refills: 0 | Status: SHIPPED | OUTPATIENT
Start: 2023-01-06 | End: 2023-03-07 | Stop reason: SDUPTHER

## 2023-01-06 RX ORDER — MIRTAZAPINE 15 MG/1
15 TABLET, FILM COATED ORAL NIGHTLY
Qty: 90 TABLET | Refills: 1 | Status: SHIPPED | OUTPATIENT
Start: 2023-01-06

## 2023-01-06 RX ORDER — POTASSIUM CHLORIDE 600 MG/1
8 TABLET, FILM COATED, EXTENDED RELEASE ORAL DAILY
Qty: 90 TABLET | Refills: 1 | Status: SHIPPED | OUTPATIENT
Start: 2023-01-06 | End: 2023-07-12 | Stop reason: SDUPTHER

## 2023-01-06 RX ORDER — ZIPRASIDONE HYDROCHLORIDE 60 MG/1
60 CAPSULE ORAL DAILY
Qty: 90 CAPSULE | Refills: 1 | Status: SHIPPED | OUTPATIENT
Start: 2023-01-06

## 2023-01-06 RX ORDER — DEXLANSOPRAZOLE 60 MG/1
60 CAPSULE, DELAYED RELEASE ORAL DAILY
Qty: 90 CAPSULE | Refills: 1 | Status: SHIPPED | OUTPATIENT
Start: 2023-01-06 | End: 2023-05-16 | Stop reason: SDUPTHER

## 2023-01-06 RX ORDER — TOPIRAMATE 200 MG/1
200 TABLET ORAL 2 TIMES DAILY
Qty: 90 TABLET | Refills: 1 | Status: SHIPPED | OUTPATIENT
Start: 2023-01-06 | End: 2023-06-20

## 2023-01-06 RX ORDER — PREGABALIN 150 MG/1
300 CAPSULE ORAL DAILY
Qty: 30 CAPSULE | Refills: 1 | Status: SHIPPED | OUTPATIENT
Start: 2023-01-06

## 2023-01-06 RX ORDER — FUROSEMIDE 20 MG/1
20 TABLET ORAL DAILY
Qty: 90 TABLET | Refills: 1 | Status: SHIPPED | OUTPATIENT
Start: 2023-01-06 | End: 2023-07-12 | Stop reason: SDUPTHER

## 2023-01-06 NOTE — PATIENT INSTRUCTIONS
Shayy was seen today for medication refill.    Diagnoses and all orders for this visit:    Seasonal allergic rhinitis due to other allergic trigger    Edema, unspecified type  -     furosemide (LASIX) 20 MG tablet; Take 1 tablet (20 mg total) by mouth once daily.    Hypokalemia  -     potassium chloride (KLOR-CON) 8 MEQ TbSR; Take 1 tablet (8 mEq total) by mouth once daily.  -     Basic Metabolic Panel; Future  -     Basic Metabolic Panel    Nausea  -     promethazine (PHENERGAN) 25 MG tablet; Take 1 tablet (25 mg total) by mouth every 8 (eight) hours as needed for Nausea.    Mild episode of recurrent major depressive disorder    Sinus headache    Fibromyalgia    Sinusitis, unspecified chronicity, unspecified location    Need for hepatitis C screening test  -     Hepatitis C Antibody; Future  -     Hepatitis C Antibody    Screening, lipid  -     Lipid Panel; Future  -     Lipid Panel    Encounter for screening mammogram for malignant neoplasm of breast  -     Mammo Digital Screening Bilat; Future    Encounter for screening colonoscopy  -     Ambulatory referral/consult to Gastroenterology; Future    Acute recurrent frontal sinusitis  -     CT Sinuses without Contrast; Future  -     levoFLOXacin (LEVAQUIN) 500 MG tablet; Take 1 tablet (500 mg total) by mouth once daily. for 14 days    Encounter for immunization  -     Influenza - Quadrivalent (PF)    Screening for diabetes mellitus  -     Glucose, Random; Future  -     Cancel: Glucose, Random    Other obesity  -     Lipid Panel; Future  -     Lipid Panel    Other orders  -     dexlansoprazole (DEXILANT) 60 mg capsule; Take 1 capsule (60 mg total) by mouth once daily.  -     mirtazapine (REMERON) 15 MG tablet; Take 1 tablet (15 mg total) by mouth every evening.  -     pregabalin (LYRICA) 150 MG capsule; Take 2 capsules (300 mg total) by mouth once daily.  -     ziprasidone (GEODON) 60 MG Cap; Take 1 capsule (60 mg total) by mouth once daily.  -     topiramate  (TOPAMAX) 200 MG Tab; Take 1 tablet (200 mg total) by mouth 2 (two) times a day.

## 2023-01-06 NOTE — PROGRESS NOTES
Subjective:       Patient ID: Shayy Hunter is a 52 y.o. female.    Chief Complaint: Medication Refill    Patient is here today for check up evaluation. Patient recent xray of sinuses showed sinusitis from 2 months ago. Patient states she feels like she constantly has allergies and stuffiness. States still has sinus headaches. Will order CT of sinus. Will order Legaquin 500 mg PO QD #14. Will order labs today. Discussed care gaps with patient. Will have flu vaccine today. Agree to mammogram and c scope. Also agreed to Hep C screening. . Will follow in 3 weeks.    Current Medications:    Current Outpatient Medications:     baclofen (LIORESAL) 10 MG tablet, Take 1 tablet (10 mg total) by mouth 3 (three) times daily., Disp: 90 tablet, Rfl: 0    benzonatate (TESSALON) 100 MG capsule, Take 1 capsule (100 mg total) by mouth 3 (three) times daily., Disp: 20 capsule, Rfl: 2    cetirizine (ZYRTEC) 10 MG tablet, Take 1 tablet (10 mg total) by mouth once daily., Disp: 30 tablet, Rfl: 0    fluticasone propionate (FLONASE) 50 mcg/actuation nasal spray, 2 sprays (100 mcg total) by Each Nostril route daily as needed for Rhinitis., Disp: 16 g, Rfl: 0    lamoTRIgine (LAMICTAL) 200 MG tablet, , Disp: , Rfl:     meloxicam (MOBIC) 15 MG tablet, Take 1 tablet (15 mg total) by mouth once daily., Disp: 30 tablet, Rfl: 0    oxycodone-acetaminophen  mg (PERCOCET)  mg per tablet, Take 1 tablet by mouth daily as needed., Disp: , Rfl:     dexlansoprazole (DEXILANT) 60 mg capsule, Take 1 capsule (60 mg total) by mouth once daily., Disp: 90 capsule, Rfl: 1    furosemide (LASIX) 20 MG tablet, Take 1 tablet (20 mg total) by mouth once daily., Disp: 90 tablet, Rfl: 1    levoFLOXacin (LEVAQUIN) 500 MG tablet, Take 1 tablet (500 mg total) by mouth once daily. for 14 days, Disp: 14 tablet, Rfl: 0    mirtazapine (REMERON) 15 MG tablet, Take 1 tablet (15 mg total) by mouth every evening., Disp: 90 tablet, Rfl: 1    potassium chloride  (KLOR-CON) 8 MEQ TbSR, Take 1 tablet (8 mEq total) by mouth once daily., Disp: 90 tablet, Rfl: 1    pregabalin (LYRICA) 150 MG capsule, Take 2 capsules (300 mg total) by mouth once daily., Disp: 30 capsule, Rfl: 1    promethazine (PHENERGAN) 25 MG tablet, Take 1 tablet (25 mg total) by mouth every 8 (eight) hours as needed for Nausea., Disp: 30 tablet, Rfl: 0    topiramate (TOPAMAX) 200 MG Tab, Take 1 tablet (200 mg total) by mouth 2 (two) times a day., Disp: 90 tablet, Rfl: 1    ziprasidone (GEODON) 60 MG Cap, Take 1 capsule (60 mg total) by mouth once daily., Disp: 90 capsule, Rfl: 1    Last Labs:     Office Visit on 01/05/2023   Component Date Value    Sodium 01/05/2023 139     Potassium 01/05/2023 4.2     Chloride 01/05/2023 107     CO2 01/05/2023 27     Anion Gap 01/05/2023 9     Glucose 01/05/2023 111 (H)     BUN 01/05/2023 9     Creatinine 01/05/2023 0.75     BUN/Creatinine Ratio 01/05/2023 12     Calcium 01/05/2023 8.6     eGFR 01/05/2023 97     Hepatitis C Ab 01/05/2023 Non-Reactive     Triglycerides 01/05/2023 160 (H)     Cholesterol 01/05/2023 163     HDL Cholesterol 01/05/2023 46     Cholesterol/HDL Ratio (R* 01/05/2023 3.5     Non-HDL 01/05/2023 117     LDL Calculated 01/05/2023 85     LDL/HDL 01/05/2023 1.8     VLDL 01/05/2023 32        Last Imaging:  X-Ray Sinuses 3 or more views  Narrative: EXAMINATION:  XR SINUSES MIN 3 VIEWS    CLINICAL HISTORY:  Acute upper respiratory infection, unspecified    COMPARISON:  None    TECHNIQUE:  Three views of the sinuses.    FINDINGS:  Suspect moderate mucosal thickening involving the inferolateral left maxillary sinus.  No convincing air-fluid level to suggest acute sinusitis.  Consider CT for further evaluation.  Anterior fusion hardware noted at C4-5 with osseous fusion.  This is only partially visualized.  Impression: Findings as detailed above.    Point of Service: [facility]    Electronically signed by: Tyrone  Lakeshia  Date:    11/03/2022  Time:    15:04         Review of Systems   HENT:  Positive for sinus pressure/congestion.    Neurological:  Positive for headaches.   All other systems reviewed and are negative.      Objective:      Physical Exam  Vitals reviewed.   Constitutional:       Appearance: Normal appearance.   Cardiovascular:      Rate and Rhythm: Normal rate and regular rhythm.      Pulses: Normal pulses.      Heart sounds: Normal heart sounds.   Pulmonary:      Effort: Pulmonary effort is normal.      Breath sounds: Normal breath sounds.   Abdominal:      General: Abdomen is flat. Bowel sounds are normal.      Palpations: Abdomen is soft.   Musculoskeletal:         General: Normal range of motion.      Cervical back: Normal range of motion and neck supple.   Skin:     General: Skin is warm and dry.   Neurological:      General: No focal deficit present.      Mental Status: She is alert and oriented to person, place, and time. Mental status is at baseline.       Assessment:       1. Seasonal allergic rhinitis due to other allergic trigger        2. Edema, unspecified type Active furosemide (LASIX) 20 MG tablet      3. Hypokalemia  potassium chloride (KLOR-CON) 8 MEQ TbSR    Basic Metabolic Panel    Basic Metabolic Panel      4. Nausea  promethazine (PHENERGAN) 25 MG tablet      5. Mild episode of recurrent major depressive disorder        6. Sinus headache        7. Fibromyalgia        8. Sinusitis, unspecified chronicity, unspecified location        9. Need for hepatitis C screening test  Hepatitis C Antibody    Hepatitis C Antibody      10. Screening, lipid  Lipid Panel    Lipid Panel      11. Encounter for screening mammogram for malignant neoplasm of breast  Mammo Digital Screening Bilat      12. Encounter for screening colonoscopy  Ambulatory referral/consult to Gastroenterology      13. Acute recurrent frontal sinusitis  CT Sinuses without Contrast    levoFLOXacin (LEVAQUIN) 500 MG tablet      14.  Encounter for immunization  Influenza - Quadrivalent (PF)      15. Screening for diabetes mellitus  Glucose, Random    CANCELED: Glucose, Random      16. Other obesity  Lipid Panel    Lipid Panel           Plan:         Shayy was seen today for medication refill.    Diagnoses and all orders for this visit:    Seasonal allergic rhinitis due to other allergic trigger    Edema, unspecified type  -     furosemide (LASIX) 20 MG tablet; Take 1 tablet (20 mg total) by mouth once daily.    Hypokalemia  -     potassium chloride (KLOR-CON) 8 MEQ TbSR; Take 1 tablet (8 mEq total) by mouth once daily.  -     Basic Metabolic Panel; Future  -     Basic Metabolic Panel    Nausea  -     promethazine (PHENERGAN) 25 MG tablet; Take 1 tablet (25 mg total) by mouth every 8 (eight) hours as needed for Nausea.    Mild episode of recurrent major depressive disorder    Sinus headache    Fibromyalgia    Sinusitis, unspecified chronicity, unspecified location    Need for hepatitis C screening test  -     Hepatitis C Antibody; Future  -     Hepatitis C Antibody    Screening, lipid  -     Lipid Panel; Future  -     Lipid Panel    Encounter for screening mammogram for malignant neoplasm of breast  -     Mammo Digital Screening Bilat; Future    Encounter for screening colonoscopy  -     Ambulatory referral/consult to Gastroenterology; Future    Acute recurrent frontal sinusitis  -     CT Sinuses without Contrast; Future  -     levoFLOXacin (LEVAQUIN) 500 MG tablet; Take 1 tablet (500 mg total) by mouth once daily. for 14 days    Encounter for immunization  -     Influenza - Quadrivalent (PF)    Screening for diabetes mellitus  -     Glucose, Random; Future  -     Cancel: Glucose, Random    Other obesity  -     Lipid Panel; Future  -     Lipid Panel    Other orders  -     dexlansoprazole (DEXILANT) 60 mg capsule; Take 1 capsule (60 mg total) by mouth once daily.  -     mirtazapine (REMERON) 15 MG tablet; Take 1 tablet (15 mg total) by mouth  every evening.  -     pregabalin (LYRICA) 150 MG capsule; Take 2 capsules (300 mg total) by mouth once daily.  -     ziprasidone (GEODON) 60 MG Cap; Take 1 capsule (60 mg total) by mouth once daily.  -     topiramate (TOPAMAX) 200 MG Tab; Take 1 tablet (200 mg total) by mouth 2 (two) times a day.

## 2023-01-09 DIAGNOSIS — Z12.11 COLON CANCER SCREENING: Primary | ICD-10-CM

## 2023-01-09 RX ORDER — POLYETHYLENE GLYCOL 3350, SODIUM SULFATE ANHYDROUS, SODIUM BICARBONATE, SODIUM CHLORIDE, POTASSIUM CHLORIDE 236; 22.74; 6.74; 5.86; 2.97 G/4L; G/4L; G/4L; G/4L; G/4L
4 POWDER, FOR SOLUTION ORAL ONCE
Qty: 4000 ML | Refills: 0 | Status: SHIPPED | OUTPATIENT
Start: 2023-01-09 | End: 2023-01-09

## 2023-01-09 NOTE — TELEPHONE ENCOUNTER
----- Message from Jacki Tee sent at 1/6/2023  9:19 AM CST -----  Regarding: Colon prep  Pt needs prep sent to [facility] [place]

## 2023-01-12 DIAGNOSIS — T78.40XD ALLERGY, SUBSEQUENT ENCOUNTER: ICD-10-CM

## 2023-01-12 DIAGNOSIS — M62.838 MUSCLE SPASM: ICD-10-CM

## 2023-01-12 DIAGNOSIS — R09.81 CONGESTION OF NASAL SINUS: ICD-10-CM

## 2023-01-12 NOTE — TELEPHONE ENCOUNTER
----- Message from Luis Lou sent at 1/12/2023  2:40 PM CST -----  Pt said she has some medicines missing that were supposed to be called in       baclofen (LIORESAL) 10 MG tablet      fluticasone propionate (FLONASE) 50 mcg/actuation nasal spray      cetirizine (ZYRTEC) 10 MG tablet      meloxicam (MOBIC) 15 MG tablet            [phone] 1554-call made to pt regarding above message; refill requets sent to pt pharmacy. No other questions asked at this time.

## 2023-01-16 RX ORDER — FLUTICASONE PROPIONATE 50 MCG
2 SPRAY, SUSPENSION (ML) NASAL DAILY PRN
Qty: 16 G | Refills: 3 | Status: SHIPPED | OUTPATIENT
Start: 2023-01-16 | End: 2023-03-07 | Stop reason: SDUPTHER

## 2023-01-16 RX ORDER — MELOXICAM 15 MG/1
15 TABLET ORAL DAILY
Qty: 90 TABLET | Refills: 1 | Status: SHIPPED | OUTPATIENT
Start: 2023-01-16 | End: 2023-07-12 | Stop reason: SDUPTHER

## 2023-01-16 RX ORDER — CETIRIZINE HYDROCHLORIDE 10 MG/1
10 TABLET ORAL DAILY
Qty: 90 TABLET | Refills: 1 | Status: SHIPPED | OUTPATIENT
Start: 2023-01-16 | End: 2023-07-12 | Stop reason: SDUPTHER

## 2023-01-16 RX ORDER — BACLOFEN 10 MG/1
10 TABLET ORAL 3 TIMES DAILY
Qty: 180 TABLET | Refills: 1 | Status: SHIPPED | OUTPATIENT
Start: 2023-01-16 | End: 2023-05-16 | Stop reason: SDUPTHER

## 2023-03-06 ENCOUNTER — HOSPITAL ENCOUNTER (OUTPATIENT)
Dept: RADIOLOGY | Facility: HOSPITAL | Age: 53
Discharge: HOME | End: 2023-03-06
Attending: INTERNAL MEDICINE
Payer: MEDICARE

## 2023-03-06 DIAGNOSIS — J01.11 ACUTE RECURRENT FRONTAL SINUSITIS: ICD-10-CM

## 2023-03-06 PROCEDURE — 70486 CT MAXILLOFACIAL W/O DYE: CPT | Mod: 26,,, | Performed by: RADIOLOGY

## 2023-03-06 PROCEDURE — 70486 CT MAXILLOFACIAL W/O DYE: CPT | Mod: TC

## 2023-03-06 PROCEDURE — 70486 CT SINUSES WITHOUT CONTRAST: ICD-10-PCS | Mod: 26,,, | Performed by: RADIOLOGY

## 2023-03-07 ENCOUNTER — OFFICE VISIT (OUTPATIENT)
Dept: FAMILY MEDICINE | Facility: CLINIC | Age: 53
End: 2023-03-07
Payer: MEDICARE

## 2023-03-07 ENCOUNTER — APPOINTMENT (OUTPATIENT)
Dept: RADIOLOGY | Facility: CLINIC | Age: 53
End: 2023-03-07
Attending: INTERNAL MEDICINE
Payer: MEDICARE

## 2023-03-07 VITALS
HEART RATE: 94 BPM | SYSTOLIC BLOOD PRESSURE: 132 MMHG | BODY MASS INDEX: 34.89 KG/M2 | OXYGEN SATURATION: 99 % | DIASTOLIC BLOOD PRESSURE: 85 MMHG | HEIGHT: 68 IN | WEIGHT: 230.19 LBS | RESPIRATION RATE: 18 BRPM | TEMPERATURE: 98 F

## 2023-03-07 DIAGNOSIS — R04.2 HEMOPTYSIS: ICD-10-CM

## 2023-03-07 DIAGNOSIS — R11.0 NAUSEA: Chronic | ICD-10-CM

## 2023-03-07 DIAGNOSIS — J32.9 SINUSITIS, UNSPECIFIED CHRONICITY, UNSPECIFIED LOCATION: ICD-10-CM

## 2023-03-07 DIAGNOSIS — J32.9 SINUSITIS, UNSPECIFIED CHRONICITY, UNSPECIFIED LOCATION: Primary | Chronic | ICD-10-CM

## 2023-03-07 DIAGNOSIS — R09.81 CONGESTION OF NASAL SINUS: ICD-10-CM

## 2023-03-07 LAB
BASOPHILS # BLD AUTO: 0.06 K/UL (ref 0–0.2)
BASOPHILS NFR BLD AUTO: 0.8 % (ref 0–1)
DIFFERENTIAL METHOD BLD: ABNORMAL
EOSINOPHIL # BLD AUTO: 0.12 K/UL (ref 0–0.5)
EOSINOPHIL NFR BLD AUTO: 1.7 % (ref 1–4)
ERYTHROCYTE [DISTWIDTH] IN BLOOD BY AUTOMATED COUNT: 14.2 % (ref 11.5–14.5)
HCT VFR BLD AUTO: 46 % (ref 38–47)
HGB BLD-MCNC: 14.5 G/DL (ref 12–16)
IMM GRANULOCYTES # BLD AUTO: 0.04 K/UL (ref 0–0.04)
IMM GRANULOCYTES NFR BLD: 0.6 % (ref 0–0.4)
LYMPHOCYTES # BLD AUTO: 1.5 K/UL (ref 1–4.8)
LYMPHOCYTES NFR BLD AUTO: 20.8 % (ref 27–41)
MCH RBC QN AUTO: 27.7 PG (ref 27–31)
MCHC RBC AUTO-ENTMCNC: 31.5 G/DL (ref 32–36)
MCV RBC AUTO: 88 FL (ref 80–96)
MONOCYTES # BLD AUTO: 0.51 K/UL (ref 0–0.8)
MONOCYTES NFR BLD AUTO: 7.1 % (ref 2–6)
MPC BLD CALC-MCNC: 9.4 FL (ref 9.4–12.4)
NEUTROPHILS # BLD AUTO: 4.99 K/UL (ref 1.8–7.7)
NEUTROPHILS NFR BLD AUTO: 69 % (ref 53–65)
NRBC # BLD AUTO: 0 X10E3/UL
NRBC, AUTO (.00): 0 %
PLATELET # BLD AUTO: 352 K/UL (ref 150–400)
RBC # BLD AUTO: 5.23 M/UL (ref 4.2–5.4)
WBC # BLD AUTO: 7.22 K/UL (ref 4.5–11)

## 2023-03-07 PROCEDURE — 85025 CBC WITH DIFFERENTIAL: ICD-10-PCS | Mod: ,,, | Performed by: CLINICAL MEDICAL LABORATORY

## 2023-03-07 PROCEDURE — 99214 OFFICE O/P EST MOD 30 MIN: CPT | Mod: ,,, | Performed by: INTERNAL MEDICINE

## 2023-03-07 PROCEDURE — 85025 COMPLETE CBC W/AUTO DIFF WBC: CPT | Mod: ,,, | Performed by: CLINICAL MEDICAL LABORATORY

## 2023-03-07 PROCEDURE — 71046 X-RAY EXAM CHEST 2 VIEWS: CPT | Mod: TC,RHCUB | Performed by: INTERNAL MEDICINE

## 2023-03-07 PROCEDURE — 99214 PR OFFICE/OUTPT VISIT, EST, LEVL IV, 30-39 MIN: ICD-10-PCS | Mod: ,,, | Performed by: INTERNAL MEDICINE

## 2023-03-07 RX ORDER — LORATADINE 10 MG/1
10 TABLET ORAL DAILY
Qty: 30 TABLET | Refills: 0 | Status: SHIPPED | OUTPATIENT
Start: 2023-03-07 | End: 2023-11-09 | Stop reason: SDUPTHER

## 2023-03-07 RX ORDER — PROMETHAZINE HYDROCHLORIDE 25 MG/1
25 TABLET ORAL EVERY 8 HOURS PRN
Qty: 30 TABLET | Refills: 1 | Status: SHIPPED | OUTPATIENT
Start: 2023-03-07 | End: 2023-05-16 | Stop reason: SDUPTHER

## 2023-03-07 RX ORDER — LEVOFLOXACIN 500 MG/1
500 TABLET, FILM COATED ORAL DAILY
Qty: 14 TABLET | Refills: 1 | Status: SHIPPED | OUTPATIENT
Start: 2023-03-07 | End: 2023-06-20

## 2023-03-07 RX ORDER — FLUTICASONE PROPIONATE 50 MCG
2 SPRAY, SUSPENSION (ML) NASAL DAILY PRN
Qty: 16 G | Refills: 3 | Status: SHIPPED | OUTPATIENT
Start: 2023-03-07 | End: 2023-11-10 | Stop reason: SDUPTHER

## 2023-03-07 NOTE — PROGRESS NOTES
Subjective:       Patient ID: Shayy Hunter is a 52 y.o. female.    Chief Complaint: Fall (3-4 days ago ), Neck Pain, Leg Pain, and Chronic Pain    HPI  .  Patient seen evaluated today.  Patient stated has sinus pressure is getting worse.  States that she woke up 2 mornings in a row with a mouth full of blood.  Patient complains of coughing up blood.  She does have hemoptysis.  And since the sinuses are not getting better going to refer her to ENT.  Please see the plan below.    Current Medications:    Current Outpatient Medications:     baclofen (LIORESAL) 10 MG tablet, Take 1 tablet (10 mg total) by mouth 3 (three) times daily., Disp: 180 tablet, Rfl: 1    cetirizine (ZYRTEC) 10 MG tablet, Take 1 tablet (10 mg total) by mouth once daily., Disp: 90 tablet, Rfl: 1    dexlansoprazole (DEXILANT) 60 mg capsule, Take 1 capsule (60 mg total) by mouth once daily., Disp: 90 capsule, Rfl: 1    fluticasone propionate (FLONASE) 50 mcg/actuation nasal spray, 2 sprays (100 mcg total) by Each Nostril route daily as needed for Rhinitis., Disp: 16 g, Rfl: 3    furosemide (LASIX) 20 MG tablet, Take 1 tablet (20 mg total) by mouth once daily., Disp: 90 tablet, Rfl: 1    lamoTRIgine (LAMICTAL) 200 MG tablet, , Disp: , Rfl:     meloxicam (MOBIC) 15 MG tablet, Take 1 tablet (15 mg total) by mouth once daily., Disp: 90 tablet, Rfl: 1    mirtazapine (REMERON) 15 MG tablet, Take 1 tablet (15 mg total) by mouth every evening., Disp: 90 tablet, Rfl: 1    oxycodone-acetaminophen  mg (PERCOCET)  mg per tablet, Take 1 tablet by mouth daily as needed., Disp: , Rfl:     potassium chloride (KLOR-CON) 8 MEQ TbSR, Take 1 tablet (8 mEq total) by mouth once daily., Disp: 90 tablet, Rfl: 1    pregabalin (LYRICA) 150 MG capsule, Take 2 capsules (300 mg total) by mouth once daily., Disp: 30 capsule, Rfl: 1    topiramate (TOPAMAX) 200 MG Tab, Take 1 tablet (200 mg total) by mouth 2 (two) times a day., Disp: 90 tablet, Rfl: 1    ziprasidone  "(GEODON) 60 MG Cap, Take 1 capsule (60 mg total) by mouth once daily., Disp: 90 capsule, Rfl: 1    benzonatate (TESSALON) 100 MG capsule, Take 1 capsule (100 mg total) by mouth 3 (three) times daily. (Patient not taking: Reported on 3/7/2023), Disp: 20 capsule, Rfl: 2    levoFLOXacin (LEVAQUIN) 500 MG tablet, Take 1 tablet (500 mg total) by mouth once daily., Disp: 14 tablet, Rfl: 1    loratadine (CLARITIN) 10 mg tablet, Take 1 tablet (10 mg total) by mouth once daily., Disp: 30 tablet, Rfl: 0    promethazine (PHENERGAN) 25 MG tablet, Take 1 tablet (25 mg total) by mouth every 8 (eight) hours as needed for Nausea., Disp: 30 tablet, Rfl: 1           Review of Systems   Constitutional:  Negative for appetite change, fatigue and fever.   Respiratory:  Positive for shortness of breath.         Hemoptysis   Cardiovascular:  Negative for chest pain.   Gastrointestinal:  Negative for abdominal pain and constipation.   Endocrine: Negative for polydipsia, polyphagia and polyuria.   Genitourinary:  Negative for difficulty urinating, frequency and hot flashes.   Allergic/Immunologic: Negative for environmental allergies.   Neurological:  Negative for dizziness and light-headedness.   Psychiatric/Behavioral:  Negative for agitation.               Vitals:    03/07/23 1007   BP: 132/85   BP Location: Right arm   Patient Position: Sitting   BP Method: Large (Manual)   Pulse: 94   Resp: 18   Temp: 97.7 °F (36.5 °C)   TempSrc: Temporal   SpO2: 99%   Weight: 104.4 kg (230 lb 3.2 oz)   Height: 5' 8" (1.727 m)        Physical Exam  Vitals and nursing note reviewed.   Constitutional:       Appearance: Normal appearance.   Cardiovascular:      Rate and Rhythm: Normal rate and regular rhythm.      Pulses: Normal pulses.      Heart sounds: Normal heart sounds.   Pulmonary:      Effort: Pulmonary effort is normal.      Breath sounds: Normal breath sounds.   Abdominal:      General: Abdomen is flat. Bowel sounds are normal.      Palpations: " Abdomen is soft.   Musculoskeletal:         General: Normal range of motion.   Skin:     General: Skin is warm and dry.   Neurological:      General: No focal deficit present.      Mental Status: She is alert and oriented to person, place, and time. Mental status is at baseline.         Last Labs:     Office Visit on 03/07/2023   Component Date Value    WBC 03/07/2023 7.22     RBC 03/07/2023 5.23     Hemoglobin 03/07/2023 14.5     Hematocrit 03/07/2023 46.0     MCV 03/07/2023 88.0     MCH 03/07/2023 27.7     MCHC 03/07/2023 31.5 (L)     RDW 03/07/2023 14.2     Platelet Count 03/07/2023 352     MPV 03/07/2023 9.4     Neutrophils % 03/07/2023 69.0 (H)     Lymphocytes % 03/07/2023 20.8 (L)     Monocytes % 03/07/2023 7.1 (H)     Eosinophils % 03/07/2023 1.7     Basophils % 03/07/2023 0.8     Immature Granulocytes % 03/07/2023 0.6 (H)     nRBC, Auto 03/07/2023 0.0     Neutrophils, Abs 03/07/2023 4.99     Lymphocytes, Absolute 03/07/2023 1.50     Monocytes, Absolute 03/07/2023 0.51     Eosinophils, Absolute 03/07/2023 0.12     Basophils, Absolute 03/07/2023 0.06     Immature Granulocytes, A* 03/07/2023 0.04     nRBC, Absolute 03/07/2023 0.00     Diff Type 03/07/2023 Auto        Last Imaging:  X-Ray Chest PA And Lateral  Narrative: EXAMINATION:  XR CHEST PA AND LATERAL    CLINICAL HISTORY:  Chronic sinusitis, unspecified    COMPARISON:  8 September 2022    TECHNIQUE:  XR CHEST PA AND LATERAL    FINDINGS:  The heart and mediastinum are normal in size and configuration.  The pulmonary vascularity is normal in caliber.  No lung infiltrates, effusions, pneumothorax or other abnormality is demonstrated.  Impression: No evidence of cardiopulmonary disease.    Electronically signed by: Sah Rios  Date:    03/07/2023  Time:    11:10         **Labs and x-rays personally reviewed by me    ** reviewed      Objective:        Assessment:       1. Sinusitis, unspecified chronicity, unspecified location  X-Ray Chest PA And  Lateral    Ambulatory referral/consult to ENT    Refer to ENT she does have chronic worsening sinusitis.  Will start Flonase and Claritin.  Levaquin 500 mg 1 p.o. q.day.      2. Nausea  promethazine (PHENERGAN) 25 MG tablet    CBC Auto Differential    PT and PTT    CBC Auto Differential    PT and PTT    Refill her Phenergan.      3. Hemoptysis  PT and PTT    PT and PTT    Chest x-ray, CBC, PT PTT      4. Congestion of nasal sinus  fluticasone propionate (FLONASE) 50 mcg/actuation nasal spray           Plan:         [unfilled]

## 2023-03-08 LAB
APTT PPP: 29.2 SECONDS (ref 25.2–37.3)
INR BLD: 1.04
PROTHROMBIN TIME: 13.2 SECONDS (ref 11.7–14.7)

## 2023-04-10 ENCOUNTER — OFFICE VISIT (OUTPATIENT)
Dept: OTOLARYNGOLOGY | Facility: CLINIC | Age: 53
End: 2023-04-10
Payer: MEDICARE

## 2023-04-10 VITALS — BODY MASS INDEX: 34.86 KG/M2 | HEIGHT: 68 IN | WEIGHT: 230 LBS

## 2023-04-10 DIAGNOSIS — J31.0 CHRONIC RHINITIS: Primary | ICD-10-CM

## 2023-04-10 DIAGNOSIS — J32.8 OTHER CHRONIC SINUSITIS: ICD-10-CM

## 2023-04-10 DIAGNOSIS — R09.81 CHRONIC NASAL CONGESTION: ICD-10-CM

## 2023-04-10 PROCEDURE — 99204 OFFICE O/P NEW MOD 45 MIN: CPT | Mod: S$PBB,,, | Performed by: OTOLARYNGOLOGY

## 2023-04-10 PROCEDURE — 99204 PR OFFICE/OUTPT VISIT, NEW, LEVL IV, 45-59 MIN: ICD-10-PCS | Mod: S$PBB,,, | Performed by: OTOLARYNGOLOGY

## 2023-04-10 PROCEDURE — 99214 OFFICE O/P EST MOD 30 MIN: CPT | Mod: PBBFAC | Performed by: OTOLARYNGOLOGY

## 2023-04-10 RX ORDER — CLINDAMYCIN HYDROCHLORIDE 300 MG/1
300 CAPSULE ORAL 2 TIMES DAILY
Qty: 28 CAPSULE | Refills: 0 | Status: SHIPPED | OUTPATIENT
Start: 2023-04-10 | End: 2023-06-20

## 2023-04-10 NOTE — PROGRESS NOTES
Subjective:       Patient ID: Shayy Hunter is a 52 y.o. female.    Chief Complaint: Sinus Problem (Patient presents for sinus issues. )    HPI  Review of Systems   Constitutional:  Negative for chills, fatigue and fever.   HENT:  Positive for sinus pressure and sinus pain. Negative for ear discharge, ear pain and postnasal drip.    Gastrointestinal:  Positive for nausea.   Neurological:  Positive for headaches.     Objective:      Physical Exam  Constitutional:       Appearance: Normal appearance.   HENT:      Head: Normocephalic.      Right Ear: Tympanic membrane, ear canal and external ear normal.      Left Ear: Tympanic membrane, ear canal and external ear normal.      Nose:      Right Sinus: Maxillary sinus tenderness and frontal sinus tenderness present.      Left Sinus: Maxillary sinus tenderness and frontal sinus tenderness present.      Mouth/Throat:      Lips: Pink.      Mouth: Mucous membranes are moist.      Pharynx: Oropharynx is clear.   Eyes:      Pupils: Pupils are equal, round, and reactive to light.   Pulmonary:      Effort: Pulmonary effort is normal.   Skin:     General: Skin is warm and dry.   Neurological:      Mental Status: She is alert and oriented to person, place, and time.   Psychiatric:         Mood and Affect: Mood normal.         Behavior: Behavior is cooperative.       Assessment:       1. Chronic rhinitis    2. Other chronic sinusitis    3. Chronic nasal congestion        Plan:   Reviewed CT sinus.  [place]  RAST  Follow up  after above.

## 2023-04-11 ENCOUNTER — OFFICE VISIT (OUTPATIENT)
Dept: FAMILY MEDICINE | Facility: CLINIC | Age: 53
End: 2023-04-11
Payer: MEDICARE

## 2023-04-11 VITALS
WEIGHT: 236.38 LBS | OXYGEN SATURATION: 99 % | TEMPERATURE: 98 F | HEART RATE: 99 BPM | BODY MASS INDEX: 35.82 KG/M2 | SYSTOLIC BLOOD PRESSURE: 105 MMHG | DIASTOLIC BLOOD PRESSURE: 74 MMHG | RESPIRATION RATE: 18 BRPM | HEIGHT: 68 IN

## 2023-04-11 DIAGNOSIS — M10.9 GOUT OF LEFT FOOT, UNSPECIFIED CAUSE, UNSPECIFIED CHRONICITY: ICD-10-CM

## 2023-04-11 DIAGNOSIS — M10.9 GOUT OF RIGHT FOOT, UNSPECIFIED CAUSE, UNSPECIFIED CHRONICITY: ICD-10-CM

## 2023-04-11 DIAGNOSIS — Z79.899 LONG TERM USE OF DRUG: ICD-10-CM

## 2023-04-11 DIAGNOSIS — G89.29 OTHER CHRONIC PAIN: Primary | ICD-10-CM

## 2023-04-11 LAB
CTP QC/QA: YES
POC (AMP) AMPHETAMINE: NEGATIVE
POC (BAR) BARBITURATES: NEGATIVE
POC (BUP) BUPRENORPHINE: NEGATIVE
POC (BZO) BENZODIAZEPINES: NEGATIVE
POC (COC) COCAINE: NEGATIVE
POC (MDMA) METHYLENEDIOXYMETHAMPHETAMINE 3,4: NEGATIVE
POC (MET) METHAMPHETAMINE: NEGATIVE
POC (MOP) OPIATES: NEGATIVE
POC (MTD) METHADONE: NEGATIVE
POC (OXY) OXYCODONE: ABNORMAL
POC (PCP) PHENCYCLIDINE: NEGATIVE
POC (TCA) TRICYCLIC ANTIDEPRESSANTS: ABNORMAL
POC TEMPERATURE (URINE): 93
POC THC: NEGATIVE

## 2023-04-11 PROCEDURE — 99214 PR OFFICE/OUTPT VISIT, EST, LEVL IV, 30-39 MIN: ICD-10-PCS | Mod: ,,, | Performed by: INTERNAL MEDICINE

## 2023-04-11 PROCEDURE — 80305 DRUG TEST PRSMV DIR OPT OBS: CPT | Mod: RHCUB | Performed by: INTERNAL MEDICINE

## 2023-04-11 PROCEDURE — 99214 OFFICE O/P EST MOD 30 MIN: CPT | Mod: ,,, | Performed by: INTERNAL MEDICINE

## 2023-04-11 RX ORDER — CITALOPRAM 10 MG/1
TABLET ORAL
COMMUNITY
Start: 2023-03-20 | End: 2023-05-16 | Stop reason: SDUPTHER

## 2023-04-11 RX ORDER — GABAPENTIN 300 MG/1
CAPSULE ORAL
COMMUNITY

## 2023-04-11 RX ORDER — HYDROXYZINE HYDROCHLORIDE 50 MG/1
TABLET, FILM COATED ORAL
COMMUNITY

## 2023-04-11 RX ORDER — PRAZOSIN HYDROCHLORIDE 1 MG/1
CAPSULE ORAL
COMMUNITY
Start: 2023-03-16

## 2023-04-12 PROBLEM — Z79.899 LONG TERM USE OF DRUG: Status: ACTIVE | Noted: 2023-04-12

## 2023-04-12 PROBLEM — G89.29 OTHER CHRONIC PAIN: Status: ACTIVE | Noted: 2023-04-12

## 2023-04-12 PROBLEM — M10.9 GOUT OF LEFT FOOT: Status: ACTIVE | Noted: 2023-04-12

## 2023-04-12 NOTE — PROGRESS NOTES
Subjective:       Patient ID: Shayy Hunter is a 52 y.o. female.    Chief Complaint: Follow-up (Requesting blood work), Neck Pain, Back Pain, Facial Pain, and Foot Pain (Left side pain )    Follow-up  Associated symptoms include arthralgias and neck pain.   Neck Pain     Back Pain    Facial Pain  Associated symptoms include arthralgias and neck pain.   Foot Pain  Associated symptoms include arthralgias and neck pain.   .  Patient seen and evaluated today she is awake alert she states that she is a chronic pain patient.  She also states that she has severe pain in the left foot.  States that he has been getting worse.  She rates the pain a 9/10.  The plan is to x-ray her left foot.  Check a urine drug screen.  In his serum drug screen.  Patient stated she had been seen pain management and there was a discrepancy on her drug screen.  She is asking that we repeat the drug screen.  Patient also has obesity and degenerative joint disease of her left foot as well.    Current Medications:    Current Outpatient Medications:     baclofen (LIORESAL) 10 MG tablet, Take 1 tablet (10 mg total) by mouth 3 (three) times daily., Disp: 180 tablet, Rfl: 1    cetirizine (ZYRTEC) 10 MG tablet, Take 1 tablet (10 mg total) by mouth once daily., Disp: 90 tablet, Rfl: 1    citalopram (CELEXA) 10 MG tablet, , Disp: , Rfl:     clindamycin (CLEOCIN) 300 MG capsule, Take 1 capsule (300 mg total) by mouth 2 (two) times a day., Disp: 28 capsule, Rfl: 0    dexlansoprazole (DEXILANT) 60 mg capsule, Take 1 capsule (60 mg total) by mouth once daily., Disp: 90 capsule, Rfl: 1    fluticasone propionate (FLONASE) 50 mcg/actuation nasal spray, 2 sprays (100 mcg total) by Each Nostril route daily as needed for Rhinitis., Disp: 16 g, Rfl: 3    furosemide (LASIX) 20 MG tablet, Take 1 tablet (20 mg total) by mouth once daily., Disp: 90 tablet, Rfl: 1    gabapentin (NEURONTIN) 300 MG capsule, 1 capsule before bedtime Orally Twice a day, Disp: , Rfl:      hydrOXYzine (ATARAX) 50 MG tablet, 1 tablet as needed Orally every 6 hrs, Disp: , Rfl:     lamoTRIgine (LAMICTAL) 200 MG tablet, , Disp: , Rfl:     levoFLOXacin (LEVAQUIN) 500 MG tablet, Take 1 tablet (500 mg total) by mouth once daily., Disp: 14 tablet, Rfl: 1    loratadine (CLARITIN) 10 mg tablet, Take 1 tablet (10 mg total) by mouth once daily., Disp: 30 tablet, Rfl: 0    meloxicam (MOBIC) 15 MG tablet, Take 1 tablet (15 mg total) by mouth once daily., Disp: 90 tablet, Rfl: 1    mirtazapine (REMERON) 15 MG tablet, Take 1 tablet (15 mg total) by mouth every evening., Disp: 90 tablet, Rfl: 1    oxycodone-acetaminophen  mg (PERCOCET)  mg per tablet, Take 1 tablet by mouth daily as needed., Disp: , Rfl:     potassium chloride (KLOR-CON) 8 MEQ TbSR, Take 1 tablet (8 mEq total) by mouth once daily., Disp: 90 tablet, Rfl: 1    prazosin (MINIPRESS) 1 MG Cap, , Disp: , Rfl:     pregabalin (LYRICA) 150 MG capsule, Take 2 capsules (300 mg total) by mouth once daily., Disp: 30 capsule, Rfl: 1    promethazine (PHENERGAN) 25 MG tablet, Take 1 tablet (25 mg total) by mouth every 8 (eight) hours as needed for Nausea., Disp: 30 tablet, Rfl: 1    topiramate (TOPAMAX) 200 MG Tab, Take 1 tablet (200 mg total) by mouth 2 (two) times a day., Disp: 90 tablet, Rfl: 1    ziprasidone (GEODON) 60 MG Cap, Take 1 capsule (60 mg total) by mouth once daily., Disp: 90 capsule, Rfl: 1    benzonatate (TESSALON) 100 MG capsule, Take 1 capsule (100 mg total) by mouth 3 (three) times daily. (Patient not taking: Reported on 3/7/2023), Disp: 20 capsule, Rfl: 2           Review of Systems   Musculoskeletal:  Positive for arthralgias, back pain and neck pain.              Vitals:    04/11/23 1606 04/11/23 1608   BP: (!) 137/96 105/74   BP Location: Right arm Left arm   Patient Position: Sitting    BP Method: Large (Automatic)    Pulse: 99    Resp: 18    Temp: 97.7 °F (36.5 °C)    TempSrc: Temporal    SpO2: 99%    Weight: 107.2 kg (236 lb 6.4  "oz)    Height: 5' 8" (1.727 m)         Physical Exam  Vitals and nursing note reviewed.   Constitutional:       Appearance: Normal appearance.   Cardiovascular:      Rate and Rhythm: Normal rate and regular rhythm.      Pulses: Normal pulses.      Heart sounds: Normal heart sounds.   Pulmonary:      Effort: Pulmonary effort is normal.      Breath sounds: Normal breath sounds.   Abdominal:      General: Abdomen is flat. Bowel sounds are normal.      Palpations: Abdomen is soft.   Musculoskeletal:         General: Normal range of motion.   Skin:     General: Skin is warm and dry.   Neurological:      General: No focal deficit present.      Mental Status: She is alert and oriented to person, place, and time. Mental status is at baseline.         Last Labs:     Office Visit on 04/11/2023   Component Date Value    POC Amphetamines 04/11/2023 Negative     POC Barbiturates 04/11/2023 Negative     POC Benzodiazepines 04/11/2023 Negative     POC Cocaine 04/11/2023 Negative     POC THC 04/11/2023 Negative     POC Methadone 04/11/2023 Negative     POC Methamphetamine 04/11/2023 Negative     POC Opiates 04/11/2023 Negative     POC Oxycodone 04/11/2023 Presumptive Positive (A)     POC Phencyclidine 04/11/2023 Negative     POC Methylenedioxymetham* 04/11/2023 Negative     POC Buprenorphine 04/11/2023 Negative      Acceptab* 04/11/2023 Yes     POC Temperature (Urine) 04/11/2023 93    Lab Visit on 04/10/2023   Component Date Value    House Dust Mites/D.P., I* 04/10/2023 <0.35     House Dust Mites/D.F., I* 04/10/2023 <0.35     Cat Epithelium, IgE 04/10/2023 <0.35     Dog Dander, IgE 04/10/2023 <0.35     [place] Grass, IgE 04/10/2023 <0.35     [place] Grass, IgE 04/10/2023 <0.35     Cockroach, IgE 04/10/2023 <0.35     Penicillium, IgE 04/10/2023 <0.35     Cladosporium, IgE 04/10/2023 <0.35     Aspergillus Fumigatus, I* 04/10/2023 <0.35     Alternaria Tenuis, IgE 04/10/2023 <0.35     [place]/Maple, IgE 04/10/2023 <0.35  "    Silver Birch, IgE 04/10/2023 <0.35     Mountain [place], IgE 04/10/2023 <0.35     Oak, IgE 04/10/2023 <0.35     Elm, IgE 04/10/2023 <0.35     [place] Tree, IgE 04/10/2023 <0.35     Pecan [place], IgE 04/10/2023 <0.35     [place], IgE 04/10/2023 <0.35     Short Ragweed, IgE 04/10/2023 <0.35     Rough Pigweed, IgE 04/10/2023 <0.35     Rough [place] Elder, IgE 04/10/2023 <0.35     Immunoglobulin E (IgE) 04/10/2023 18.6     [place], IgE 04/10/2023 <0.35        Last Imaging:  X-Ray Foot 2 View Left  Narrative: EXAMINATION:  XR FOOT 2 VIEW LEFT    CLINICAL HISTORY:  .  Gout, unspecified    COMPARISON:  No previous similar    TECHNIQUE:  Left foot AP and lateral    FINDINGS:  Osseous structures are well mineralized.  There is no acute fracture or dislocation.  There is moderate plantar calcaneal spur formation.  There is mild osteophyte formation, eburnation, and joint space narrowing of the 1st MTP joint.  Impression: Mild osteoarthritis 1st MTP joint    Plantar calcaneal spur    Electronically signed by: Mychal Batista  Date:    04/11/2023  Time:    18:12         **Labs and x-rays personally reviewed by me    ** reviewed      Objective:        Assessment:       1. Other chronic pain  Miscellaneous Test, Sendout Drug Screen    POCT Urine Drug Screen Presump    Miscellaneous Test, Sendout Drug Screen    CANCELED: POCT Urine Drug Screen Presump      2. Long term use of drug  POCT Urine Drug Screen Presump    CANCELED: POCT Urine Drug Screen Presump      3. Gout of right foot, unspecified cause, unspecified chronicity  CANCELED: X-Ray Foot 2 View Right      4. Gout of left foot, unspecified cause, unspecified chronicity  X-Ray Foot 2 View Left           Plan:         [unfilled]

## 2023-04-17 ENCOUNTER — TELEPHONE (OUTPATIENT)
Dept: FAMILY MEDICINE | Facility: CLINIC | Age: 53
End: 2023-04-17
Payer: MEDICARE

## 2023-04-17 LAB — BEAKER SEE SCANNED REPORT: NORMAL

## 2023-04-17 NOTE — TELEPHONE ENCOUNTER
----- Message from Juno Brady MD sent at 4/17/2023  9:05 AM CDT -----  Need to see 04/19/2023   Abnormal x-ray of the foot    1354- call made to pt; pt stated she will call back for an appt due to a lot of things going on this week.

## 2023-04-24 ENCOUNTER — PATIENT OUTREACH (OUTPATIENT)
Dept: ADMINISTRATIVE | Facility: HOSPITAL | Age: 53
End: 2023-04-24

## 2023-04-24 ENCOUNTER — PATIENT MESSAGE (OUTPATIENT)
Dept: ADMINISTRATIVE | Facility: HOSPITAL | Age: 53
End: 2023-04-24

## 2023-04-24 NOTE — PROGRESS NOTES
04/24/2023 Care Everywhere updates requested and reviewed.  Immunizations reconciled. Media reports reviewed.  Duplicate HM overrides removed.  Overdue HM topic chart audit and/or requested.     [facility] and [facility] reviewed  Care Team Updated  Message sent via Patient Portal regarding Overdue/Soon Due HM Topics    Health Maintenance Due   Topic Date Due    Cervical Cancer Screening  Never done    HIV Screening  Never done    Hemoglobin A1c (Diabetic Prevention Screening)  Never done    Mammogram  12/07/2019    COVID-19 Vaccine (3 - Booster for Moderna series) 10/05/2021    Shingles Vaccine (1 of 2) Never done

## 2023-05-09 DIAGNOSIS — Z71.89 COMPLEX CARE COORDINATION: ICD-10-CM

## 2023-05-16 ENCOUNTER — OFFICE VISIT (OUTPATIENT)
Dept: FAMILY MEDICINE | Facility: CLINIC | Age: 53
End: 2023-05-16
Payer: MEDICARE

## 2023-05-16 VITALS
BODY MASS INDEX: 34.54 KG/M2 | TEMPERATURE: 98 F | HEART RATE: 102 BPM | WEIGHT: 233.19 LBS | OXYGEN SATURATION: 96 % | HEIGHT: 69 IN | RESPIRATION RATE: 18 BRPM | DIASTOLIC BLOOD PRESSURE: 87 MMHG | SYSTOLIC BLOOD PRESSURE: 134 MMHG

## 2023-05-16 DIAGNOSIS — E66.9 CLASS 1 OBESITY WITH BODY MASS INDEX (BMI) OF 34.0 TO 34.9 IN ADULT, UNSPECIFIED OBESITY TYPE, UNSPECIFIED WHETHER SERIOUS COMORBIDITY PRESENT: ICD-10-CM

## 2023-05-16 DIAGNOSIS — M62.838 MUSCLE SPASM: ICD-10-CM

## 2023-05-16 DIAGNOSIS — M19.072 OSTEOARTHRITIS OF LEFT FOOT, UNSPECIFIED OSTEOARTHRITIS TYPE: Primary | ICD-10-CM

## 2023-05-16 DIAGNOSIS — T78.40XD ALLERGY, SUBSEQUENT ENCOUNTER: ICD-10-CM

## 2023-05-16 DIAGNOSIS — K21.9 GASTROESOPHAGEAL REFLUX DISEASE, UNSPECIFIED WHETHER ESOPHAGITIS PRESENT: ICD-10-CM

## 2023-05-16 DIAGNOSIS — R11.0 NAUSEA: Chronic | ICD-10-CM

## 2023-05-16 DIAGNOSIS — G89.4 CHRONIC PAIN SYNDROME: ICD-10-CM

## 2023-05-16 PROCEDURE — 99214 OFFICE O/P EST MOD 30 MIN: CPT | Mod: ,,, | Performed by: INTERNAL MEDICINE

## 2023-05-16 PROCEDURE — 99214 PR OFFICE/OUTPT VISIT, EST, LEVL IV, 30-39 MIN: ICD-10-PCS | Mod: ,,, | Performed by: INTERNAL MEDICINE

## 2023-05-16 RX ORDER — CITALOPRAM 10 MG/1
10 TABLET ORAL DAILY
Qty: 30 TABLET | Refills: 1 | Status: SHIPPED | OUTPATIENT
Start: 2023-05-16

## 2023-05-16 RX ORDER — PROMETHAZINE HYDROCHLORIDE 25 MG/1
25 TABLET ORAL EVERY 8 HOURS PRN
Qty: 30 TABLET | Refills: 1 | Status: SHIPPED | OUTPATIENT
Start: 2023-05-16 | End: 2023-07-12 | Stop reason: SDUPTHER

## 2023-05-16 RX ORDER — BACLOFEN 10 MG/1
10 TABLET ORAL 3 TIMES DAILY
Qty: 180 TABLET | Refills: 1 | Status: SHIPPED | OUTPATIENT
Start: 2023-05-16 | End: 2023-09-12 | Stop reason: SDUPTHER

## 2023-05-16 RX ORDER — DEXLANSOPRAZOLE 60 MG/1
60 CAPSULE, DELAYED RELEASE ORAL DAILY
Qty: 30 CAPSULE | Refills: 11 | Status: SHIPPED | OUTPATIENT
Start: 2023-05-16 | End: 2023-06-20 | Stop reason: SDUPTHER

## 2023-05-17 PROBLEM — T78.40XA ALLERGIES: Status: ACTIVE | Noted: 2023-05-17

## 2023-05-17 PROBLEM — E66.9 CLASS 1 OBESITY WITH BODY MASS INDEX (BMI) OF 34.0 TO 34.9 IN ADULT: Status: ACTIVE | Noted: 2023-05-17

## 2023-05-17 PROBLEM — E66.811 CLASS 1 OBESITY WITH BODY MASS INDEX (BMI) OF 34.0 TO 34.9 IN ADULT: Status: ACTIVE | Noted: 2023-05-17

## 2023-05-17 PROBLEM — M19.072 OSTEOARTHRITIS OF LEFT FOOT: Status: ACTIVE | Noted: 2023-05-17

## 2023-05-17 PROBLEM — G89.4 CHRONIC PAIN SYNDROME: Status: ACTIVE | Noted: 2023-04-12

## 2023-05-17 PROBLEM — K21.9 GASTROESOPHAGEAL REFLUX DISEASE: Status: ACTIVE | Noted: 2023-05-17

## 2023-05-17 PROBLEM — R11.0 NAUSEA: Chronic | Status: ACTIVE | Noted: 2023-05-17

## 2023-05-17 PROBLEM — M62.838 MUSCLE SPASM: Status: ACTIVE | Noted: 2023-05-17

## 2023-05-17 NOTE — PROGRESS NOTES
Subjective:       Patient ID: Shayy Hunter is a 52 y.o. female.    Chief Complaint: Chronic Pain (Foot, back , neck , shoulder )    Chronic Pain  Associated symptoms: no abdominal pain, no chest pain, no constipation, no dizziness and no shortness of breath    .  Patient presents with a history of reflux disease, chronic pain, obesity, complains of pain in the left foot with suggested history of a bone spur.    Patient requesting a PA for DEXA I LA ENT  Patient also will need a referral to Podiatry due to a bone spur in the left foot.  Refill several medications  Claritin Mobic Remeron KCl pregabalin Phenergan Topamax Geodon baclofen, and Lasix.    Current Medications:    Current Outpatient Medications:     cetirizine (ZYRTEC) 10 MG tablet, Take 1 tablet (10 mg total) by mouth once daily., Disp: 90 tablet, Rfl: 1    clindamycin (CLEOCIN) 300 MG capsule, Take 1 capsule (300 mg total) by mouth 2 (two) times a day., Disp: 28 capsule, Rfl: 0    fluticasone propionate (FLONASE) 50 mcg/actuation nasal spray, 2 sprays (100 mcg total) by Each Nostril route daily as needed for Rhinitis., Disp: 16 g, Rfl: 3    furosemide (LASIX) 20 MG tablet, Take 1 tablet (20 mg total) by mouth once daily., Disp: 90 tablet, Rfl: 1    gabapentin (NEURONTIN) 300 MG capsule, 1 capsule before bedtime Orally Twice a day, Disp: , Rfl:     hydrOXYzine (ATARAX) 50 MG tablet, 1 tablet as needed Orally every 6 hrs, Disp: , Rfl:     lamoTRIgine (LAMICTAL) 200 MG tablet, , Disp: , Rfl:     levoFLOXacin (LEVAQUIN) 500 MG tablet, Take 1 tablet (500 mg total) by mouth once daily., Disp: 14 tablet, Rfl: 1    loratadine (CLARITIN) 10 mg tablet, Take 1 tablet (10 mg total) by mouth once daily., Disp: 30 tablet, Rfl: 0    meloxicam (MOBIC) 15 MG tablet, Take 1 tablet (15 mg total) by mouth once daily., Disp: 90 tablet, Rfl: 1    mirtazapine (REMERON) 15 MG tablet, Take 1 tablet (15 mg total) by mouth every evening., Disp: 90 tablet, Rfl: 1     oxycodone-acetaminophen  mg (PERCOCET)  mg per tablet, Take 1 tablet by mouth daily as needed., Disp: , Rfl:     potassium chloride (KLOR-CON) 8 MEQ TbSR, Take 1 tablet (8 mEq total) by mouth once daily., Disp: 90 tablet, Rfl: 1    prazosin (MINIPRESS) 1 MG Cap, , Disp: , Rfl:     pregabalin (LYRICA) 150 MG capsule, Take 2 capsules (300 mg total) by mouth once daily., Disp: 30 capsule, Rfl: 1    topiramate (TOPAMAX) 200 MG Tab, Take 1 tablet (200 mg total) by mouth 2 (two) times a day., Disp: 90 tablet, Rfl: 1    ziprasidone (GEODON) 60 MG Cap, Take 1 capsule (60 mg total) by mouth once daily., Disp: 90 capsule, Rfl: 1    baclofen (LIORESAL) 10 MG tablet, Take 1 tablet (10 mg total) by mouth 3 (three) times daily., Disp: 180 tablet, Rfl: 1    benzonatate (TESSALON) 100 MG capsule, Take 1 capsule (100 mg total) by mouth 3 (three) times daily. (Patient not taking: Reported on 3/7/2023), Disp: 20 capsule, Rfl: 2    citalopram (CELEXA) 10 MG tablet, Take 1 tablet (10 mg total) by mouth once daily., Disp: 30 tablet, Rfl: 1    dexlansoprazole (DEXILANT) 60 mg capsule, Take 1 capsule (60 mg total) by mouth once daily., Disp: 30 capsule, Rfl: 11    promethazine (PHENERGAN) 25 MG tablet, Take 1 tablet (25 mg total) by mouth every 8 (eight) hours as needed for Nausea., Disp: 30 tablet, Rfl: 1           Review of Systems   Constitutional:  Negative for appetite change, fatigue and fever.   Respiratory:  Negative for shortness of breath.    Cardiovascular:  Negative for chest pain.   Gastrointestinal:  Positive for reflux. Negative for abdominal pain and constipation.   Endocrine: Negative for polydipsia, polyphagia and polyuria.   Genitourinary:  Negative for difficulty urinating, frequency and hot flashes.   Musculoskeletal:  Positive for arthralgias.   Allergic/Immunologic: Negative for environmental allergies.   Neurological:  Negative for dizziness and light-headedness.   Psychiatric/Behavioral:  Negative for  "agitation.               Vitals:    05/16/23 1115 05/16/23 1119   BP: (!) 131/99 134/87   BP Location: Right arm Left arm   Patient Position: Sitting    BP Method: Large (Automatic)    Pulse: 102    Resp: 18    Temp: 97.9 °F (36.6 °C)    TempSrc: Temporal    SpO2: 96%    Weight: 105.8 kg (233 lb 3.2 oz)    Height: 5' 9" (1.753 m)         Physical Exam  Vitals and nursing note reviewed.   Constitutional:       Appearance: Normal appearance. She is obese.   Cardiovascular:      Rate and Rhythm: Normal rate and regular rhythm.      Pulses: Normal pulses.      Heart sounds: Normal heart sounds.   Pulmonary:      Effort: Pulmonary effort is normal.      Breath sounds: Normal breath sounds.   Abdominal:      General: Abdomen is flat. Bowel sounds are normal.      Palpations: Abdomen is soft.   Musculoskeletal:         General: Normal range of motion.   Skin:     General: Skin is warm and dry.   Neurological:      General: No focal deficit present.      Mental Status: She is alert and oriented to person, place, and time. Mental status is at baseline.         Last Labs:     No visits with results within 1 Month(s) from this visit.   Latest known visit with results is:   Office Visit on 04/11/2023   Component Date Value    POC Amphetamines 04/11/2023 Negative     POC Barbiturates 04/11/2023 Negative     POC Benzodiazepines 04/11/2023 Negative     POC Cocaine 04/11/2023 Negative     POC THC 04/11/2023 Negative     POC Methadone 04/11/2023 Negative     POC Methamphetamine 04/11/2023 Negative     POC Opiates 04/11/2023 Negative     POC Oxycodone 04/11/2023 Presumptive Positive (A)     POC Phencyclidine 04/11/2023 Negative     POC Methylenedioxymetham* 04/11/2023 Negative     POC Buprenorphine 04/11/2023 Negative      Acceptab* 04/11/2023 Yes     POC Temperature (Urine) 04/11/2023 93     See Scanned Report 04/11/2023 See Scanned Result        Last Imaging:  X-Ray Foot 2 View Left  Narrative: EXAMINATION:  XR FOOT " 2 VIEW LEFT    CLINICAL HISTORY:  .  Gout, unspecified    COMPARISON:  No previous similar    TECHNIQUE:  Left foot AP and lateral    FINDINGS:  Osseous structures are well mineralized.  There is no acute fracture or dislocation.  There is moderate plantar calcaneal spur formation.  There is mild osteophyte formation, eburnation, and joint space narrowing of the 1st MTP joint.  Impression: Mild osteoarthritis 1st MTP joint    Plantar calcaneal spur    Electronically signed by: Mychal Batista  Date:    04/11/2023  Time:    18:12         **Labs and x-rays personally reviewed by me    ** reviewed      Objective:        Assessment:       1. Osteoarthritis of left foot, unspecified osteoarthritis type  Ambulatory referral/consult to Podiatry      2. Muscle spasm  baclofen (LIORESAL) 10 MG tablet      3. Allergy, subsequent encounter        4. Nausea  promethazine (PHENERGAN) 25 MG tablet    Refill her Phenergan.      5. Class 1 obesity with body mass index (BMI) of 34.0 to 34.9 in adult, unspecified obesity type, unspecified whether serious comorbidity present        6. Gastroesophageal reflux disease, unspecified whether esophagitis present        7. Chronic pain syndrome             Plan:         [unfilled]

## 2023-06-20 ENCOUNTER — OFFICE VISIT (OUTPATIENT)
Dept: GASTROENTEROLOGY | Facility: CLINIC | Age: 53
End: 2023-06-20
Payer: MEDICARE

## 2023-06-20 VITALS
SYSTOLIC BLOOD PRESSURE: 140 MMHG | WEIGHT: 244.81 LBS | OXYGEN SATURATION: 97 % | HEIGHT: 69 IN | BODY MASS INDEX: 36.26 KG/M2 | HEART RATE: 97 BPM | DIASTOLIC BLOOD PRESSURE: 82 MMHG

## 2023-06-20 DIAGNOSIS — K59.04 CHRONIC IDIOPATHIC CONSTIPATION: ICD-10-CM

## 2023-06-20 DIAGNOSIS — R10.13 EPIGASTRIC PAIN: ICD-10-CM

## 2023-06-20 DIAGNOSIS — R13.10 DYSPHAGIA, UNSPECIFIED TYPE: Primary | ICD-10-CM

## 2023-06-20 DIAGNOSIS — R11.0 NAUSEA: ICD-10-CM

## 2023-06-20 DIAGNOSIS — Z12.11 SCREENING FOR COLON CANCER: ICD-10-CM

## 2023-06-20 DIAGNOSIS — K21.9 GASTROESOPHAGEAL REFLUX DISEASE, UNSPECIFIED WHETHER ESOPHAGITIS PRESENT: ICD-10-CM

## 2023-06-20 PROCEDURE — 99214 OFFICE O/P EST MOD 30 MIN: CPT | Mod: S$PBB,,,

## 2023-06-20 PROCEDURE — 99214 OFFICE O/P EST MOD 30 MIN: CPT | Mod: PBBFAC

## 2023-06-20 PROCEDURE — 99214 PR OFFICE/OUTPT VISIT, EST, LEVL IV, 30-39 MIN: ICD-10-PCS | Mod: S$PBB,,,

## 2023-06-20 RX ORDER — TOPIRAMATE 100 MG/1
1 TABLET, FILM COATED ORAL 2 TIMES DAILY
COMMUNITY
Start: 2023-06-14

## 2023-06-20 RX ORDER — DEXLANSOPRAZOLE 60 MG/1
60 CAPSULE, DELAYED RELEASE ORAL DAILY
Qty: 90 CAPSULE | Refills: 3 | Status: SHIPPED | OUTPATIENT
Start: 2023-06-20 | End: 2023-11-08 | Stop reason: SDUPTHER

## 2023-06-20 RX ORDER — LACTULOSE 10 G/15ML
10 SOLUTION ORAL; RECTAL 2 TIMES DAILY PRN
Qty: 237 ML | Refills: 0 | Status: SHIPPED | OUTPATIENT
Start: 2023-06-20

## 2023-06-20 NOTE — PROGRESS NOTES
Shayy Hunter is a 52 y.o. female here for Gastroesophageal Reflux        PCP: Juno Brady  Referring Provider: No referring provider defined for this encounter.     HPI:  Ms. Hunter is a 51 yo female who presents to clinic today with complaint of increased GERD symptoms. Patient reports daily symptoms of epigastric pain/burning. Symptoms will wake her from sleep at night. Also experiencing recurrent nausea and vomiting. She has tried multiple therapies in the past including omeprazole and pantoprazole along with OTC therapies. None of these provided her with relief of symptoms. She reports taking Dexilant for many years now; this has controlled her symptoms well until she ran out and medication was no longer covered by her insurance. She is requesting refill today. She complains of dysphagia. Required dilation in 2017 with Dr. Crespo that improved symptoms until they returned over the past several months. She reports alternating constipation and diarrhea throughout her life. Was seen by GI in 2021 for diarrhea; now reports she is experiencing just constipation. Denies any hematochezia or melena. She has tried OTC laxatives and stool softeners without relief; also tried Movantik but this caused a lot of stomach cramping. Reports lactulose has helped with constipation in the past. She had colonoscopy in 2008; denies any FMH of CRC or IBD.          ROS:  Review of Systems   Constitutional:  Positive for appetite change. Negative for fatigue, fever and unexpected weight change.   HENT:  Positive for trouble swallowing.    Respiratory:  Negative for shortness of breath.    Cardiovascular:  Negative for chest pain.   Gastrointestinal:  Positive for abdominal pain, constipation, nausea and reflux. Negative for blood in stool, diarrhea and vomiting.   Integumentary:  Negative for color change.   Neurological:  Negative for weakness and headaches.   Psychiatric/Behavioral:  Negative for confusion.         PMHX:   has a past medical history of Depression (7/31/2012), GERD (gastroesophageal reflux disease), Herniation of intervertebral disc between L4 and L5, LBP (low back pain) (7/31/2012), and Sciatica.    PSHX:  has a past surgical history that includes Tonsillectomy; Cholecystectomy (3/2012); and Back surgery.    PFHX: family history includes Diabetes in her mother; Hypertension in her mother; Neuropathy in her father.    PSlHX:  reports that she quit smoking about 12 years ago. Her smoking use included cigarettes. She has a 0.28 pack-year smoking history. She has never used smokeless tobacco. She reports that she does not drink alcohol and does not use drugs.        Review of patient's allergies indicates:   Allergen Reactions    Perflutren lipid microspheres Shortness Of Breath, Nausea Only, Rash and Other (See Comments)     Red faced, short of breath and nauseated.     Amoxicillin Rash and Other (See Comments)       Medication List with Changes/Refills   New Medications    LACTULOSE (CHRONULAC) 10 GRAM/15 ML SOLUTION    Take 15 mLs (10 g total) by mouth 2 (two) times daily as needed (constipation).   Current Medications    BACLOFEN (LIORESAL) 10 MG TABLET    Take 1 tablet (10 mg total) by mouth 3 (three) times daily.    CETIRIZINE (ZYRTEC) 10 MG TABLET    Take 1 tablet (10 mg total) by mouth once daily.    CITALOPRAM (CELEXA) 10 MG TABLET    Take 1 tablet (10 mg total) by mouth once daily.    FLUTICASONE PROPIONATE (FLONASE) 50 MCG/ACTUATION NASAL SPRAY    2 sprays (100 mcg total) by Each Nostril route daily as needed for Rhinitis.    FUROSEMIDE (LASIX) 20 MG TABLET    Take 1 tablet (20 mg total) by mouth once daily.    GABAPENTIN (NEURONTIN) 300 MG CAPSULE    1 capsule before bedtime Orally Twice a day    HYDROXYZINE (ATARAX) 50 MG TABLET    1 tablet as needed Orally every 6 hrs    LAMOTRIGINE (LAMICTAL) 200 MG TABLET        LORATADINE (CLARITIN) 10 MG TABLET    Take 1 tablet (10 mg total) by mouth once daily.     "MELOXICAM (MOBIC) 15 MG TABLET    Take 1 tablet (15 mg total) by mouth once daily.    MIRTAZAPINE (REMERON) 15 MG TABLET    Take 1 tablet (15 mg total) by mouth every evening.    OXYCODONE-ACETAMINOPHEN  MG (PERCOCET)  MG PER TABLET    Take 1 tablet by mouth daily as needed.    POTASSIUM CHLORIDE (KLOR-CON) 8 MEQ TBSR    Take 1 tablet (8 mEq total) by mouth once daily.    PRAZOSIN (MINIPRESS) 1 MG CAP        PREGABALIN (LYRICA) 150 MG CAPSULE    Take 2 capsules (300 mg total) by mouth once daily.    PROMETHAZINE (PHENERGAN) 25 MG TABLET    Take 1 tablet (25 mg total) by mouth every 8 (eight) hours as needed for Nausea.    TOPIRAMATE (TOPAMAX) 100 MG TABLET    Take 1 tablet by mouth 2 (two) times a day.    ZIPRASIDONE (GEODON) 60 MG CAP    Take 1 capsule (60 mg total) by mouth once daily.   Changed and/or Refilled Medications    Modified Medication Previous Medication    DEXLANSOPRAZOLE (DEXILANT) 60 MG CAPSULE dexlansoprazole (DEXILANT) 60 mg capsule       Take 1 capsule (60 mg total) by mouth once daily.    Take 1 capsule (60 mg total) by mouth once daily.   Discontinued Medications    BENZONATATE (TESSALON) 100 MG CAPSULE    Take 1 capsule (100 mg total) by mouth 3 (three) times daily.    CLINDAMYCIN (CLEOCIN) 300 MG CAPSULE    Take 1 capsule (300 mg total) by mouth 2 (two) times a day.    LEVOFLOXACIN (LEVAQUIN) 500 MG TABLET    Take 1 tablet (500 mg total) by mouth once daily.    TOPIRAMATE (TOPAMAX) 200 MG TAB    Take 1 tablet (200 mg total) by mouth 2 (two) times a day.        Objective Findings:  Vital Signs:  BP (!) 140/82   Pulse 97   Ht 5' 9" (1.753 m)   Wt 111 kg (244 lb 12.8 oz)   LMP 05/23/2023 (Within Days)   SpO2 97%   BMI 36.15 kg/m²  Body mass index is 36.15 kg/m².    Physical Exam:  Physical Exam  Vitals reviewed.   Constitutional:       General: She is not in acute distress.     Appearance: Normal appearance. She is obese.   HENT:      Mouth/Throat:      Mouth: Mucous membranes " are moist.   Cardiovascular:      Rate and Rhythm: Normal rate and regular rhythm.      Pulses: Normal pulses.   Pulmonary:      Effort: Pulmonary effort is normal.      Breath sounds: Normal breath sounds.   Abdominal:      General: Bowel sounds are normal. There is no distension.      Palpations: Abdomen is soft.      Tenderness: There is no abdominal tenderness. There is no guarding.   Musculoskeletal:         General: Normal range of motion.   Skin:     General: Skin is warm and dry.   Neurological:      Mental Status: She is alert and oriented to person, place, and time.   Psychiatric:         Mood and Affect: Mood normal.        Labs:  Lab Results   Component Value Date    WBC 7.22 03/07/2023    HGB 14.5 03/07/2023    HCT 46.0 03/07/2023    MCV 88.0 03/07/2023    RDW 14.2 03/07/2023     03/07/2023    LYMPH 20.8 (L) 03/07/2023    LYMPH 1.50 03/07/2023    MONO 7.1 (H) 03/07/2023    EOS 0.12 03/07/2023    BASO 0.06 03/07/2023     Lab Results   Component Value Date     01/05/2023    K 4.2 01/05/2023     01/05/2023    CO2 27 01/05/2023     (H) 01/05/2023    BUN 9 01/05/2023    CREATININE 0.75 01/05/2023    CALCIUM 8.6 01/05/2023    PROT 7.4 10/18/2012    ALBUMIN 3.8 10/18/2012    BILITOT 0.5 10/18/2012    ALKPHOS 95 10/18/2012    AST 18 10/18/2012    ALT 15 10/18/2012         Imaging: No results found.      Assessment:  Shayy Hunter is a 52 y.o. female here with:  1. Dysphagia, unspecified type    2. Screening for colon cancer    3. Gastroesophageal reflux disease, unspecified whether esophagitis present    4. Chronic idiopathic constipation    5. Epigastric pain    6. Nausea          Recommendations:  1. EGD for dysphagia, epigastric pain, recurrent nausea  2. Refill Dexilant 60 mg daily for GERD that has failed other therapies   3. Schedule colonoscopy for screening  4. Lactulose PRN for constipation    Follow up in about 6 months (around 12/20/2023).      Order summary:  Orders Placed  This Encounter    dexlansoprazole (DEXILANT) 60 mg capsule    lactulose (CHRONULAC) 10 gram/15 mL solution    EGD w Dilation    Colonoscopy       Thank you for allowing me to participate in the care of Shayy Hunter.      [place], FNP-BC, VIVIANA-ACNP-BC

## 2023-07-12 ENCOUNTER — OFFICE VISIT (OUTPATIENT)
Dept: FAMILY MEDICINE | Facility: CLINIC | Age: 53
End: 2023-07-12
Payer: MEDICARE

## 2023-07-12 VITALS
RESPIRATION RATE: 19 BRPM | HEART RATE: 102 BPM | TEMPERATURE: 98 F | WEIGHT: 233.19 LBS | BODY MASS INDEX: 34.54 KG/M2 | OXYGEN SATURATION: 98 % | DIASTOLIC BLOOD PRESSURE: 88 MMHG | SYSTOLIC BLOOD PRESSURE: 126 MMHG | HEIGHT: 69 IN

## 2023-07-12 DIAGNOSIS — R60.9 EDEMA, UNSPECIFIED TYPE: ICD-10-CM

## 2023-07-12 DIAGNOSIS — R11.0 NAUSEA: Chronic | ICD-10-CM

## 2023-07-12 DIAGNOSIS — R09.81 CONGESTION OF NASAL SINUS: ICD-10-CM

## 2023-07-12 DIAGNOSIS — E11.9 DM (DIABETES MELLITUS): ICD-10-CM

## 2023-07-12 DIAGNOSIS — E87.6 HYPOKALEMIA: ICD-10-CM

## 2023-07-12 DIAGNOSIS — Z13.1 SCREENING FOR DIABETES MELLITUS (DM): Primary | ICD-10-CM

## 2023-07-12 DIAGNOSIS — M19.072 OSTEOARTHRITIS OF LEFT FOOT, UNSPECIFIED OSTEOARTHRITIS TYPE: ICD-10-CM

## 2023-07-12 DIAGNOSIS — T78.40XD ALLERGY, SUBSEQUENT ENCOUNTER: ICD-10-CM

## 2023-07-12 PROCEDURE — 99214 PR OFFICE/OUTPT VISIT, EST, LEVL IV, 30-39 MIN: ICD-10-PCS | Mod: ,,, | Performed by: INTERNAL MEDICINE

## 2023-07-12 PROCEDURE — 99214 OFFICE O/P EST MOD 30 MIN: CPT | Mod: ,,, | Performed by: INTERNAL MEDICINE

## 2023-07-12 RX ORDER — FLUTICASONE PROPIONATE 50 MCG
2 SPRAY, SUSPENSION (ML) NASAL DAILY PRN
Qty: 16 G | Refills: 3 | Status: CANCELLED | OUTPATIENT
Start: 2023-07-12

## 2023-07-12 NOTE — PROGRESS NOTES
Pt is requesting refill on Flonase; pt is okay with taking medication due to allergy contradiction

## 2023-07-13 RX ORDER — PROMETHAZINE HYDROCHLORIDE 25 MG/1
25 TABLET ORAL EVERY 8 HOURS PRN
Qty: 30 TABLET | Refills: 1 | Status: SHIPPED | OUTPATIENT
Start: 2023-07-13 | End: 2023-09-12 | Stop reason: SDUPTHER

## 2023-07-13 RX ORDER — MELOXICAM 15 MG/1
15 TABLET ORAL DAILY
Qty: 90 TABLET | Refills: 1 | Status: SHIPPED | OUTPATIENT
Start: 2023-07-13 | End: 2024-01-08 | Stop reason: SDUPTHER

## 2023-07-13 RX ORDER — POTASSIUM CHLORIDE 600 MG/1
8 TABLET, FILM COATED, EXTENDED RELEASE ORAL DAILY
Qty: 90 TABLET | Refills: 1 | Status: SHIPPED | OUTPATIENT
Start: 2023-07-13 | End: 2024-01-08 | Stop reason: SDUPTHER

## 2023-07-13 RX ORDER — CETIRIZINE HYDROCHLORIDE 10 MG/1
10 TABLET ORAL DAILY
Qty: 90 TABLET | Refills: 1 | Status: SHIPPED | OUTPATIENT
Start: 2023-07-13 | End: 2024-01-08 | Stop reason: SDUPTHER

## 2023-07-13 RX ORDER — FUROSEMIDE 20 MG/1
20 TABLET ORAL DAILY
Qty: 90 TABLET | Refills: 1 | Status: SHIPPED | OUTPATIENT
Start: 2023-07-13 | End: 2023-10-03 | Stop reason: SDUPTHER

## 2023-07-16 PROBLEM — R60.9 EDEMA: Status: ACTIVE | Noted: 2023-07-16

## 2023-07-16 PROBLEM — E87.6 HYPOKALEMIA: Status: ACTIVE | Noted: 2023-07-16

## 2023-07-16 PROBLEM — Z13.1 SCREENING FOR DIABETES MELLITUS (DM): Status: ACTIVE | Noted: 2023-04-12

## 2023-07-16 PROBLEM — E11.9 DM (DIABETES MELLITUS): Status: ACTIVE | Noted: 2023-07-16

## 2023-07-17 NOTE — PROGRESS NOTES
Subjective:       Patient ID: Shayy Hunter is a 52 y.o. female.    Chief Complaint: Foot Pain, Back Pain, and Medication Refill  Patient seen and evaluated.  Patient does have a history of a fracture of the left foot.  Patient has OA of the left foot.  And also patient complains of chronic allergic rhinitis.  Blood work has not been done in a while I will order BNP hemoglobin A1c.    Will order Zyrtec for the chronic allergic rhinitis and also will refill the Flonase.  For the chronic osteoarthritis plan will be Mobic 15 mg 1 p.o. q.day.  Patient does have a history of hypokalemia and will refill the potassium today.  Foot Pain  Associated symptoms include arthralgias. Pertinent negatives include no abdominal pain, chest pain, fatigue or fever.   Back Pain  Pertinent negatives include no abdominal pain, chest pain or fever.   Medication Refill  Associated symptoms include arthralgias. Pertinent negatives include no abdominal pain, chest pain, fatigue or fever.   .    Current Medications:    Current Outpatient Medications:     baclofen (LIORESAL) 10 MG tablet, Take 1 tablet (10 mg total) by mouth 3 (three) times daily., Disp: 180 tablet, Rfl: 1    citalopram (CELEXA) 10 MG tablet, Take 1 tablet (10 mg total) by mouth once daily., Disp: 30 tablet, Rfl: 1    dexlansoprazole (DEXILANT) 60 mg capsule, Take 1 capsule (60 mg total) by mouth once daily., Disp: 90 capsule, Rfl: 3    fluticasone propionate (FLONASE) 50 mcg/actuation nasal spray, 2 sprays (100 mcg total) by Each Nostril route daily as needed for Rhinitis., Disp: 16 g, Rfl: 3    gabapentin (NEURONTIN) 300 MG capsule, 1 capsule before bedtime Orally Twice a day, Disp: , Rfl:     hydrOXYzine (ATARAX) 50 MG tablet, 1 tablet as needed Orally every 6 hrs, Disp: , Rfl:     lactulose (CHRONULAC) 10 gram/15 mL solution, Take 15 mLs (10 g total) by mouth 2 (two) times daily as needed (constipation)., Disp: 237 mL, Rfl: 0    lamoTRIgine (LAMICTAL) 200 MG tablet, , Disp:  , Rfl:     loratadine (CLARITIN) 10 mg tablet, Take 1 tablet (10 mg total) by mouth once daily., Disp: 30 tablet, Rfl: 0    mirtazapine (REMERON) 15 MG tablet, Take 1 tablet (15 mg total) by mouth every evening., Disp: 90 tablet, Rfl: 1    oxycodone-acetaminophen  mg (PERCOCET)  mg per tablet, Take 1 tablet by mouth daily as needed., Disp: , Rfl:     prazosin (MINIPRESS) 1 MG Cap, , Disp: , Rfl:     pregabalin (LYRICA) 150 MG capsule, Take 2 capsules (300 mg total) by mouth once daily., Disp: 30 capsule, Rfl: 1    topiramate (TOPAMAX) 100 MG tablet, Take 1 tablet by mouth 2 (two) times a day., Disp: , Rfl:     ziprasidone (GEODON) 60 MG Cap, Take 1 capsule (60 mg total) by mouth once daily., Disp: 90 capsule, Rfl: 1    cetirizine (ZYRTEC) 10 MG tablet, Take 1 tablet (10 mg total) by mouth once daily., Disp: 90 tablet, Rfl: 1    furosemide (LASIX) 20 MG tablet, Take 1 tablet (20 mg total) by mouth once daily., Disp: 90 tablet, Rfl: 1    meloxicam (MOBIC) 15 MG tablet, Take 1 tablet (15 mg total) by mouth once daily., Disp: 90 tablet, Rfl: 1    potassium chloride (KLOR-CON) 8 MEQ TbSR, Take 1 tablet (8 mEq total) by mouth once daily., Disp: 90 tablet, Rfl: 1    promethazine (PHENERGAN) 25 MG tablet, Take 1 tablet (25 mg total) by mouth every 8 (eight) hours as needed for Nausea., Disp: 30 tablet, Rfl: 1           Review of Systems   Constitutional:  Negative for appetite change, fatigue and fever.   Respiratory:  Negative for shortness of breath.    Cardiovascular:  Negative for chest pain.   Gastrointestinal:  Negative for abdominal pain and constipation.   Endocrine: Negative for polydipsia, polyphagia and polyuria.   Genitourinary:  Negative for difficulty urinating, frequency and hot flashes.   Musculoskeletal:  Positive for arthralgias and back pain.   Allergic/Immunologic: Negative for environmental allergies.   Neurological:  Negative for dizziness and light-headedness.   Psychiatric/Behavioral:   "Negative for agitation.               Vitals:    07/12/23 1116 07/12/23 1117   BP: (!) 138/93 126/88   BP Location: Right arm Left arm   Patient Position: Sitting    BP Method: Large (Automatic)    Pulse: 102    Resp: 19    Temp: 97.6 °F (36.4 °C)    TempSrc: Temporal    SpO2: 98%    Weight: 105.8 kg (233 lb 3.2 oz)    Height: 5' 9" (1.753 m)         Physical Exam  Vitals and nursing note reviewed.   Constitutional:       Appearance: Normal appearance. She is obese.   Cardiovascular:      Rate and Rhythm: Normal rate and regular rhythm.      Pulses: Normal pulses.      Heart sounds: Normal heart sounds.   Pulmonary:      Effort: Pulmonary effort is normal.      Breath sounds: Normal breath sounds.   Abdominal:      General: Abdomen is flat. Bowel sounds are normal.      Palpations: Abdomen is soft.   Musculoskeletal:         General: Normal range of motion.   Skin:     General: Skin is warm and dry.   Neurological:      General: No focal deficit present.      Mental Status: She is alert and oriented to person, place, and time. Mental status is at baseline.         Last Labs:     No visits with results within 1 Month(s) from this visit.   Latest known visit with results is:   Office Visit on 04/11/2023   Component Date Value    POC Amphetamines 04/11/2023 Negative     POC Barbiturates 04/11/2023 Negative     POC Benzodiazepines 04/11/2023 Negative     POC Cocaine 04/11/2023 Negative     POC THC 04/11/2023 Negative     POC Methadone 04/11/2023 Negative     POC Methamphetamine 04/11/2023 Negative     POC Opiates 04/11/2023 Negative     POC Oxycodone 04/11/2023 Presumptive Positive (A)     POC Phencyclidine 04/11/2023 Negative     POC Methylenedioxymetham* 04/11/2023 Negative     POC Buprenorphine 04/11/2023 Negative      Acceptab* 04/11/2023 Yes     POC Temperature (Urine) 04/11/2023 93     See Scanned Report 04/11/2023 See Scanned Result        Last Imaging:  X-Ray Foot 2 View Left  Narrative: " EXAMINATION:  XR FOOT 2 VIEW LEFT    CLINICAL HISTORY:  .  Gout, unspecified    COMPARISON:  No previous similar    TECHNIQUE:  Left foot AP and lateral    FINDINGS:  Osseous structures are well mineralized.  There is no acute fracture or dislocation.  There is moderate plantar calcaneal spur formation.  There is mild osteophyte formation, eburnation, and joint space narrowing of the 1st MTP joint.  Impression: Mild osteoarthritis 1st MTP joint    Plantar calcaneal spur    Electronically signed by: Mychal Batista  Date:    04/11/2023  Time:    18:12         **Labs and x-rays personally reviewed by me    ** reviewed      Objective:        Assessment:       1. Screening for diabetes mellitus (DM)  Hemoglobin A1C      2. Allergy, subsequent encounter  cetirizine (ZYRTEC) 10 MG tablet      3. Congestion of nasal sinus        4. Edema, unspecified type Active furosemide (LASIX) 20 MG tablet      5. Hypokalemia  potassium chloride (KLOR-CON) 8 MEQ TbSR      6. Nausea  promethazine (PHENERGAN) 25 MG tablet    Refill her Phenergan.      7. Osteoarthritis of left foot, unspecified osteoarthritis type  Basic Metabolic Panel      8. DM (diabetes mellitus)  Hemoglobin A1C           Plan:         [unfilled]

## 2023-07-20 ENCOUNTER — PATIENT MESSAGE (OUTPATIENT)
Dept: ADMINISTRATIVE | Facility: HOSPITAL | Age: 53
End: 2023-07-20

## 2023-08-24 DIAGNOSIS — M79.672 PAIN IN LEFT FOOT: Primary | ICD-10-CM

## 2023-08-28 ENCOUNTER — CLINICAL SUPPORT (OUTPATIENT)
Dept: REHABILITATION | Facility: HOSPITAL | Age: 53
End: 2023-08-28
Payer: MEDICARE

## 2023-08-28 DIAGNOSIS — M79.672 PAIN IN LEFT FOOT: ICD-10-CM

## 2023-08-28 DIAGNOSIS — R26.9 ABNORMAL GAIT: Primary | ICD-10-CM

## 2023-08-28 DIAGNOSIS — M25.672 DECREASED RANGE OF MOTION OF LEFT ANKLE: ICD-10-CM

## 2023-08-28 PROCEDURE — 97162 PT EVAL MOD COMPLEX 30 MIN: CPT

## 2023-08-28 NOTE — PLAN OF CARE
[facility]  Physical Therapy Initial Evaluation    Name: Shayy Hunter  Clinic Number: [id number]    Therapy Diagnosis:   Encounter Diagnoses   Name Primary?    Pain in left foot     Decreased range of motion of left ankle     Abnormal gait Yes     Physician: Beto Segal DPM    Physician Orders: PT Eval and Treat  Medical Diagnosis from Referral: M79.672 (ICD-10-CM) - Pain in left foot  Evaluation Date: 8/28/2023  Authorization Period Expiration: 8/23/2024  Plan of Care Expiration: 10/13/2023  Visit # / Visits authorized: 1/13 (including initial evaluation)    Time In: 1410  Time Out: 1445  Total Appointment Time (timed & untimed codes): 35 minutes    Precautions: Standard and Fall    Subjective     Date of onset: ~6 months ago    History of current condition - Shayy reports she fractured a bone on the left lateral surface of her foot. Patient reports she walked around on her foot not knowing that it was broken for ~4 months, wore a walking boot for about 2 months, and has been out of the boot for ~1 week. Patient reports her left foot was cleared in terms of fracture healing via a MRI ~1 month ago. Patient ambulates into physical therapy treatment with a single point cane.     Falls: frequent stumbles; no complete falls recently    Imaging: See available imaging in Epic    Prior Therapy: none for presenting condition  Social History: lives alone  Steps/Stairs: none  Occupation: disability  Driving: No  Durable Medical Equipment: single point cane; rolling walker; left walking boot  Dominant Extremity: right  Affected Extremity: left  Prior Level of Function: modified independent with the use of a single point cane held in the right upper extremity x ~3 years  Current Level of Function: same as prior level of function but with increased pain in the left foot    Pain:  Current 2/10, worst 6/10, best 2/10   Location: left lateral foot   Description: Aching and  Throbbing  Aggravating Factors: Standing, Walking, and Night Time  Easing Factors: ice, rest, elevation, and Voltaren gel    Pts goals: Patient wishes to decrease the pain in her left foot and improve her functional independence.     Medical History:   Past Medical History:   Diagnosis Date    Depression 7/31/2012    GERD (gastroesophageal reflux disease)     Herniation of intervertebral disc between L4 and L5     L4, L5, S1    LBP (low back pain) 7/31/2012    Sciatica      Surgical History:   Shayy Hunter  has a past surgical history that includes Tonsillectomy; Cholecystectomy (3/2012); and Back surgery.    Medications:   Shayy has a current medication list which includes the following prescription(s): baclofen, cetirizine, citalopram, dexlansoprazole, fluticasone propionate, furosemide, gabapentin, hydroxyzine, lactulose, lamotrigine, loratadine, meloxicam, mirtazapine, oxycodone-acetaminophen, potassium chloride, prazosin, pregabalin, promethazine, topiramate, and ziprasidone.    Allergies:   Review of patient's allergies indicates:   Allergen Reactions    Perflutren lipid microspheres Shortness Of Breath, Nausea Only, Rash and Other (See Comments)     Red faced, short of breath and nauseated.     Amoxicillin Rash and Other (See Comments)      Objective     Range of motion:   Right Left    Ankle dorsiflexion neutral -6 degrees   Ankle plantarflexion WFL WFL   Ankle inversion 12* 10*   Ankle eversion 28* 26*     Manual muscle testing:   Right  Left    Ankle dorsiflexion  MMT strength: 4/5  MMT strength: 3+/5   Ankle plantarflexion  MMT strength: 4/5  MMT strength: 3+/5   Ankle inversion  MMT strength: 4/5  MMT strength: 3+/5   Ankle eversion  MMT strength: 4/5  MMT strength: 3+/5     Incision: N/A    Gait:  Weight bearing precautions: WBAT  Assistive device: straight cane  Ambulation distance and deviations: short community distances with modified independence; excessive bilateral hip external rotation;  "decreased gait speed; decreased right stance time  Stairs: N/A    Patient Education and Home Exercises     Education provided: Patient educated on the importance of completing skilled physical therapy treatment and home exercise program as prescribed to maximize functional gains.     Written Home Exercises Provided: yes. Exercises were reviewed and Shayy was able to demonstrate them prior to the end of the session. Shayy demonstrated fair  understanding of the education provided.     See EMR under "Patient Instructions" for exercises provided during therapy sessions.    Assessment     Shayy is a 52 y.o. female referred to outpatient physical therapy with a medical diagnosis of M79.672 (ICD-10-CM) - Pain in left foot. Pt presents to PT with left lateral foot pain, decreased range of motion in bilateral ankles (left worse), bilateral ankle weakness (left worse), and abnormal gait mechanics. Patient tenderness on palpation to the left lateral foot with mild edema noted around the left ankle and on the dorsal aspect of the left foot.    Pt prognosis is Good.   Pt will benefit from skilled outpatient Physical Therapy to address the deficits stated above and in the chart below, provide pt/family education, and to maximize pt's level of independence.     Plan of care discussed with patient: Yes  Pt's spiritual, cultural and educational needs considered and pt agreeable to plan of care and goals as stated below:     Anticipated Barriers for therapy: compliance with home exercise program; natural course of healing    Medical Necessity is demonstrated by the following:  History  Co-morbidities and personal factors that may impact the plan of care [] LOW: no personal factors / co-morbidities  [] MODERATE: 1-2 personal factors / co-morbidities  [x] HIGH: 3+ personal factors / co-morbidities    Moderate / High Support Documentation:   Co-morbidities affecting plan of care: fibromyalgia; chronic pain    Personal Factors: "   lifestyle     Examination  Body Structures and Functions, activity limitations and participation restrictions that may impact the plan of care [] LOW: addressing 1-2 elements  [x] MODERATE: 3+ elements  [] HIGH: 4+ elements (please support below)    Moderate / High Support Documentation: range of motion, strength, and gait mechanics     Clinical Presentation [] LOW: stable  [x] MODERATE: Evolving  [] HIGH: Unstable     Decision Making/ Complexity Score: moderate       Goals:    Short Term Goals:  Patient will demonstrate independence with home exercise program to ensure carryover of treatment.  Patient will demonstrate left ankle dorsiflexion active range of motion to neutral to improve functional use of the left lower extremity as well as standing posture.   Patient will improve left ankle strength to 4-/5 to improve independence and safety with bed mobility, transfers, and gait.  Patient will ambulate 150 feet with a single point cane with modified independence with normal gait mechanics with equal stance time to improve independence and safety with gait.   Patient will report a reduction in left foot pain from 6/10 to 4/10 at worst to improve quality of life.     Long Term Goals:  Patient will improve left ankle strength to 4/5 to improve independence and safety with bed mobility, transfers, and gait.  Patient will ambulate 300 feet with a single point cane with modified independence including up/down curbs and ramps to improve independence and safety with community ambulation.  Patient will report a reduction in left foot pain from 6/10 to 2/10 at worst to improve quality of life.     Plan     Plan of care Certification: 8/28/2023 to 10/13/2023.    Outpatient Physical Therapy 2 times weekly for 6 weeks to include the following interventions: Electrical Stimulation (IFC/premodulated IFC), Gait Training, Manual Therapy, Moist Heat/ Ice, Neuromuscular Re-ed, Patient Education, Therapeutic Activities, Therapeutic  Exercise, and Ultrasound.       Steven Donato, PT, DPT  8/28/2023      Physician's Signature: _________________________________________  Date: __________________________

## 2023-08-30 ENCOUNTER — ANESTHESIA EVENT (OUTPATIENT)
Dept: GASTROENTEROLOGY | Facility: HOSPITAL | Age: 53
End: 2023-08-30
Payer: MEDICARE

## 2023-08-30 ENCOUNTER — HOSPITAL ENCOUNTER (OUTPATIENT)
Dept: GASTROENTEROLOGY | Facility: HOSPITAL | Age: 53
Discharge: HOME | End: 2023-08-30
Payer: MEDICARE

## 2023-08-30 ENCOUNTER — ANESTHESIA (OUTPATIENT)
Dept: GASTROENTEROLOGY | Facility: HOSPITAL | Age: 53
End: 2023-08-30
Payer: MEDICARE

## 2023-08-30 VITALS
OXYGEN SATURATION: 100 % | BODY MASS INDEX: 34.51 KG/M2 | SYSTOLIC BLOOD PRESSURE: 157 MMHG | HEIGHT: 69 IN | WEIGHT: 233 LBS | HEART RATE: 71 BPM | DIASTOLIC BLOOD PRESSURE: 98 MMHG | RESPIRATION RATE: 11 BRPM | TEMPERATURE: 97 F

## 2023-08-30 DIAGNOSIS — R13.10 DYSPHAGIA, UNSPECIFIED TYPE: ICD-10-CM

## 2023-08-30 DIAGNOSIS — K21.9 GASTROESOPHAGEAL REFLUX DISEASE WITHOUT ESOPHAGITIS: Primary | ICD-10-CM

## 2023-08-30 PROCEDURE — D9220A PRA ANESTHESIA: Mod: ,,, | Performed by: NURSE ANESTHETIST, CERTIFIED REGISTERED

## 2023-08-30 PROCEDURE — 88305 TISSUE EXAM BY PATHOLOGIST: CPT | Mod: TC,SUR,59 | Performed by: INTERNAL MEDICINE

## 2023-08-30 PROCEDURE — 88342 SURGICAL PATHOLOGY: ICD-10-PCS | Mod: 26,,, | Performed by: PATHOLOGY

## 2023-08-30 PROCEDURE — 43239 EGD BIOPSY SINGLE/MULTIPLE: CPT | Mod: 59,,, | Performed by: INTERNAL MEDICINE

## 2023-08-30 PROCEDURE — 88305 SURGICAL PATHOLOGY: ICD-10-PCS | Mod: 26,,, | Performed by: PATHOLOGY

## 2023-08-30 PROCEDURE — 43239 EGD BIOPSY SINGLE/MULTIPLE: CPT | Mod: 59

## 2023-08-30 PROCEDURE — 27201423 OPTIME MED/SURG SUP & DEVICES STERILE SUPPLY

## 2023-08-30 PROCEDURE — 43239 PR EGD, FLEX, W/BIOPSY, SGL/MULTI: ICD-10-PCS | Mod: 59,,, | Performed by: INTERNAL MEDICINE

## 2023-08-30 PROCEDURE — 37000008 HC ANESTHESIA 1ST 15 MINUTES

## 2023-08-30 PROCEDURE — 43248 EGD GUIDE WIRE INSERTION: CPT

## 2023-08-30 PROCEDURE — 25000003 PHARM REV CODE 250: Performed by: NURSE ANESTHETIST, CERTIFIED REGISTERED

## 2023-08-30 PROCEDURE — 63600175 PHARM REV CODE 636 W HCPCS: Performed by: NURSE ANESTHETIST, CERTIFIED REGISTERED

## 2023-08-30 PROCEDURE — 43248 PR EGD, FLEX, W/DILATION OVER GUIDEWIRE: ICD-10-PCS | Mod: ,,, | Performed by: INTERNAL MEDICINE

## 2023-08-30 PROCEDURE — 88342 IMHCHEM/IMCYTCHM 1ST ANTB: CPT | Mod: 26,,, | Performed by: PATHOLOGY

## 2023-08-30 PROCEDURE — D9220A PRA ANESTHESIA: ICD-10-PCS | Mod: ,,, | Performed by: NURSE ANESTHETIST, CERTIFIED REGISTERED

## 2023-08-30 PROCEDURE — 88305 TISSUE EXAM BY PATHOLOGIST: CPT | Mod: 26,,, | Performed by: PATHOLOGY

## 2023-08-30 PROCEDURE — 43248 EGD GUIDE WIRE INSERTION: CPT | Mod: ,,, | Performed by: INTERNAL MEDICINE

## 2023-08-30 RX ORDER — FENTANYL CITRATE 50 UG/ML
INJECTION, SOLUTION INTRAMUSCULAR; INTRAVENOUS
Status: DISCONTINUED | OUTPATIENT
Start: 2023-08-30 | End: 2023-08-30

## 2023-08-30 RX ORDER — PROPOFOL 10 MG/ML
VIAL (ML) INTRAVENOUS
Status: DISCONTINUED | OUTPATIENT
Start: 2023-08-30 | End: 2023-08-30

## 2023-08-30 RX ORDER — LIDOCAINE HYDROCHLORIDE 20 MG/ML
INJECTION, SOLUTION EPIDURAL; INFILTRATION; INTRACAUDAL; PERINEURAL
Status: DISCONTINUED | OUTPATIENT
Start: 2023-08-30 | End: 2023-08-30

## 2023-08-30 RX ORDER — SODIUM CHLORIDE 0.9 % (FLUSH) 0.9 %
10 SYRINGE (ML) INJECTION
Status: DISCONTINUED | OUTPATIENT
Start: 2023-08-30 | End: 2023-08-31 | Stop reason: HOSPADM

## 2023-08-30 RX ADMIN — SODIUM CHLORIDE: 9 INJECTION, SOLUTION INTRAVENOUS at 08:08

## 2023-08-30 RX ADMIN — LIDOCAINE HYDROCHLORIDE 80 MG: 20 INJECTION, SOLUTION INTRAVENOUS at 08:08

## 2023-08-30 RX ADMIN — PROPOFOL 80 MG: 10 INJECTION, EMULSION INTRAVENOUS at 08:08

## 2023-08-30 RX ADMIN — FENTANYL CITRATE 100 MCG: 50 INJECTION INTRAMUSCULAR; INTRAVENOUS at 08:08

## 2023-08-30 RX ADMIN — PROPOFOL 40 MG: 10 INJECTION, EMULSION INTRAVENOUS at 08:08

## 2023-08-30 NOTE — DISCHARGE INSTRUCTIONS
Procedure Date  8/30/23     Impression  Overall Impression:   Mild abnormal mucosa, consistent with gastritis in the antrum; performed cold forceps biopsies  The upper third of the esophagus, middle third of the esophagus, lower third of the esophagus, Z-line, cardia, fundus of the stomach, body of the stomach, incisura, duodenal bulb, 1st part of the duodenum and 2nd part of the duodenum appeared normal. Performed random biopsy to rule out celiac disease and eosinophilic esophagitis.  Dilated in the esophagus with Savpetar-Rashad dilator to 51 Fr ending size. Dilation caused improved passage of the scope     Recommendation    Await pathology results  Repeat EGD with dilation in the future if symptoms recur  Follow up in GI clinic as needed      Outcome of procedure: successful Colonoscopy  Disposition: patient to recovery following procedure; discharge to home when appropriate parameters met  Provisions for follow up: please call my office for any unexpected symptoms like chest or abdominal pain or bleeding following your procedure.  Final Diagnosis: dysphagia    NO DRIVING, OPERATING EQUIPMENT, OR SIGNING LEGAL DOCUMENTS FOR 24 HOURS.     THE NURSE WILL CALL YOU WITH YOUR BIOPSY RESULTS IN A FEW DAYS.

## 2023-08-30 NOTE — ANESTHESIA POSTPROCEDURE EVALUATION
Anesthesia Post Evaluation    Patient: Shayy Hunter    Procedure(s) Performed: * No procedures listed *    Final Anesthesia Type: general      Patient location during evaluation: GI PACU  Patient participation: Yes- Able to Participate  Level of consciousness: awake and alert  Post-procedure vital signs: reviewed and stable  Pain management: adequate  Airway patency: patent    PONV status at discharge: No PONV  Anesthetic complications: no      Cardiovascular status: blood pressure returned to baseline and hemodynamically stable  Respiratory status: spontaneous ventilation  Hydration status: euvolemic  Follow-up not needed.  Comments: Refer to nursing notes for pain/myla score upon discharge from recovery.          Vitals Value Taken Time   /88 08/30/23 0859   Temp 36.3 °C (97.3 °F) 08/30/23 0846   Pulse 75 08/30/23 0900   Resp 13 08/30/23 0900   SpO2 98 % 08/30/23 0900   Vitals shown include unvalidated device data.      No case tracking events are documented in the log.      Pain/Myla Score: Myla Score: 10 (8/30/2023  8:56 AM)

## 2023-08-30 NOTE — H&P
Gastroenterology Pre-procedure H&P    Chief Complaint: GERD (gastroesophageal reflux disease)    History of Present Illness    Shayy Hunter is a 52 y.o. female that  has a past medical history of Depression (2012), GERD (gastroesophageal reflux disease), Herniation of intervertebral disc between L4 and L5, LBP (low back pain) (2012), and Sciatica.     Patient with GERD and dysphagia here for EGD. Has had prior EGD with dilation with improvement in symptoms. Does reports prior cervical spine fusion.       Past Medical History:   Diagnosis Date    Depression 2012    GERD (gastroesophageal reflux disease)     Herniation of intervertebral disc between L4 and L5     L4, L5, S1    LBP (low back pain) 2012    Sciatica        Past Surgical History:   Procedure Laterality Date    ANTERIOR CERVICAL DISCECTOMY W/ FUSION  2016    BACK SURGERY      back surgery      CHOLECYSTECTOMY  2012    TONSILLECTOMY         Family History   Problem Relation Age of Onset    Hypertension Mother     Diabetes Mother     Neuropathy Father        Social History     Socioeconomic History    Marital status: Single   Tobacco Use    Smoking status: Former     Current packs/day: 0.00     Average packs/day: 0.1 packs/day for 2.0 years (0.3 ttl pk-yrs)     Types: Cigarettes     Start date: 2009     Quit date: 2011     Years since quittin.5    Smokeless tobacco: Never    Tobacco comments:     quit 1 1/2 years ago   Substance and Sexual Activity    Alcohol use: No    Drug use: Never       Current Outpatient Medications   Medication Sig Dispense Refill    meloxicam (MOBIC) 15 MG tablet Take 1 tablet (15 mg total) by mouth once daily. 90 tablet 1    baclofen (LIORESAL) 10 MG tablet Take 1 tablet (10 mg total) by mouth 3 (three) times daily. 180 tablet 1    cetirizine (ZYRTEC) 10 MG tablet Take 1 tablet (10 mg total) by mouth once daily. 90 tablet 1    citalopram (CELEXA) 10 MG tablet Take 1 tablet (10 mg  total) by mouth once daily. 30 tablet 1    dexlansoprazole (DEXILANT) 60 mg capsule Take 1 capsule (60 mg total) by mouth once daily. 90 capsule 3    fluticasone propionate (FLONASE) 50 mcg/actuation nasal spray 2 sprays (100 mcg total) by Each Nostril route daily as needed for Rhinitis. 16 g 3    furosemide (LASIX) 20 MG tablet Take 1 tablet (20 mg total) by mouth once daily. 90 tablet 1    gabapentin (NEURONTIN) 300 MG capsule 1 capsule before bedtime Orally Twice a day      hydrOXYzine (ATARAX) 50 MG tablet 1 tablet as needed Orally every 6 hrs      lactulose (CHRONULAC) 10 gram/15 mL solution Take 15 mLs (10 g total) by mouth 2 (two) times daily as needed (constipation). 237 mL 0    lamoTRIgine (LAMICTAL) 200 MG tablet       loratadine (CLARITIN) 10 mg tablet Take 1 tablet (10 mg total) by mouth once daily. 30 tablet 0    mirtazapine (REMERON) 15 MG tablet Take 1 tablet (15 mg total) by mouth every evening. 90 tablet 1    oxycodone-acetaminophen  mg (PERCOCET)  mg per tablet Take 1 tablet by mouth daily as needed.      potassium chloride (KLOR-CON) 8 MEQ TbSR Take 1 tablet (8 mEq total) by mouth once daily. 90 tablet 1    prazosin (MINIPRESS) 1 MG Cap       pregabalin (LYRICA) 150 MG capsule Take 2 capsules (300 mg total) by mouth once daily. 30 capsule 1    promethazine (PHENERGAN) 25 MG tablet Take 1 tablet (25 mg total) by mouth every 8 (eight) hours as needed for Nausea. 30 tablet 1    topiramate (TOPAMAX) 100 MG tablet Take 1 tablet by mouth 2 (two) times a day.      ziprasidone (GEODON) 60 MG Cap Take 1 capsule (60 mg total) by mouth once daily. 90 capsule 1     Current Facility-Administered Medications   Medication Dose Route Frequency Provider Last Rate Last Admin    sodium chloride 0.9% flush 10 mL  10 mL Intravenous PRN Luiz Hutchinson MD         Facility-Administered Medications Ordered in Other Encounters   Medication Dose Route Frequency Provider Last Rate Last Admin    fentaNYL 50 mcg/mL  "injection   Intravenous PRN Toma Porter CRNA   100 mcg at 08/30/23 0833    sodium chloride 0.9% infusion   Intravenous Continuous PRN Toma Porter CRNA 150 mL/hr at 08/30/23 0833 New Bag at 08/30/23 0833       Review of patient's allergies indicates:   Allergen Reactions    Perflutren lipid microspheres Shortness Of Breath, Nausea Only, Rash and Other (See Comments)     Red faced, short of breath and nauseated.     Amoxicillin Rash and Other (See Comments)       Objective:  Vitals:    08/30/23 0731   BP: 112/83   Pulse: 92   Resp: 12   SpO2: 98%   Weight: 105.7 kg (233 lb)   Height: 5' 9" (1.753 m)        GEN: normal appearing, NAD, AAO x3  HENT: NCAT, anicteric, OP benign  CV: normal rate, regular rhythm  RESP: CTA, symmetric rise, unlabored  ABD: soft, ND, no guarding or TTP  SKIN: warm and dry  NEURO: grossly afocal    Assessment and Plan:    Proceed with:    EGD for GERD, dysphagia      Luiz Hutchinson MD  Gastroenterology    "

## 2023-08-30 NOTE — TRANSFER OF CARE
"Anesthesia Transfer of Care Note    Patient: Shayy Hunter    Procedure(s) Performed: * No procedures listed *    Patient location: GI    Anesthesia Type: general    Transport from OR: Transported from OR on room air with adequate spontaneous ventilation. Continuous ECG monitoring in transport. Continuous SpO2 monitoring in transport    Post pain: adequate analgesia    Post assessment: no apparent anesthetic complications    Post vital signs: stable    Level of consciousness: sedated and responds to stimulation    Nausea/Vomiting: no nausea/vomiting    Complications: none    Transfer of care protocol was followedComments: Good SV continue, NAD, VSS, RTRN      Last vitals:   Visit Vitals  /86   Pulse 89   Temp 36.3 °C (97.3 °F)   Resp 14   Ht 5' 9" (1.753 m)   Wt 105.7 kg (233 lb)   SpO2 99%   Breastfeeding No   BMI 34.41 kg/m²     "

## 2023-08-30 NOTE — ANESTHESIA PREPROCEDURE EVALUATION
08/30/2023  Shayy Hunter is a 52 y.o., female.      Pre-op Assessment    I have reviewed the Patient Summary Reports.     I have reviewed the Nursing Notes. I have reviewed the NPO Status.   I have reviewed the Medications.     Review of Systems  Anesthesia Hx:  No problems with previous Anesthesia    Social:  Former Smoker    Hepatic/GI:   GERD    Musculoskeletal:   Arthritis     Neurological:   Neuromuscular Disease,    Endocrine:  Obesity / BMI > 30  Psych:   Psychiatric History depression             Anesthesia Plan  Type of Anesthesia, risks & benefits discussed:    Anesthesia Type: Gen Natural Airway  Intra-op Monitoring Plan: Standard ASA Monitors  Post Op Pain Control Plan: IV/PO Opioids PRN  Induction:  IV  Informed Consent: Informed consent signed with the Patient and all parties understand the risks and agree with anesthesia plan.  All questions answered. Patient consented to blood products? Yes  ASA Score: 3  Day of Surgery Review of History & Physical: H&P Update referred to the surgeon/provider.I have interviewed and examined the patient. I have reviewed the patient's H&P dated: There are no significant changes.     Ready For Surgery From Anesthesia Perspective.     .

## 2023-08-31 LAB
ESTROGEN SERPL-MCNC: NORMAL PG/ML
INSULIN SERPL-ACNC: NORMAL U[IU]/ML
LAB AP GROSS DESCRIPTION: NORMAL
LAB AP LABORATORY NOTES: NORMAL
T3RU NFR SERPL: NORMAL %

## 2023-09-01 ENCOUNTER — CLINICAL SUPPORT (OUTPATIENT)
Dept: REHABILITATION | Facility: HOSPITAL | Age: 53
End: 2023-09-01
Payer: MEDICARE

## 2023-09-01 DIAGNOSIS — R26.9 ABNORMAL GAIT: ICD-10-CM

## 2023-09-01 DIAGNOSIS — M25.672 DECREASED RANGE OF MOTION OF LEFT ANKLE: Primary | ICD-10-CM

## 2023-09-01 PROCEDURE — 97035 APP MDLTY 1+ULTRASOUND EA 15: CPT

## 2023-09-01 PROCEDURE — 97110 THERAPEUTIC EXERCISES: CPT

## 2023-09-01 PROCEDURE — 97010 HOT OR COLD PACKS THERAPY: CPT

## 2023-09-01 NOTE — PROGRESS NOTES
[facility]  Physical Therapy Treatment Note    Name: Shayy Hunter  Clinic Number: [id number]    Therapy Diagnosis:   Encounter Diagnoses   Name Primary?    Pain in left foot      Decreased range of motion of left ankle      Abnormal gait Yes     Physician: Beto Segal DPM    Visit Date: 9/1/2023    Physician Orders: PT Eval and Treat  Medical Diagnosis from Referral: M79.672 (ICD-10-CM) - Pain in left foot  Evaluation Date: 8/28/2023  Authorization Period Expiration: 8/23/2024  Plan of Care Expiration: 10/13/2023  Visit # / Visits authorized: 1/13 (including initial evaluation)  PTA Visit #: 0    Time In: 1315  Time Out: 1411  Total Billable Time: 56 minutes    Precautions: Standard and Fall   Functional Level Prior to Evaluation: modified independent with the use of a single point cane held in the right upper extremity x ~3 years     Subjective     Pt reports: The pain in her left foot is about the same today.    She was compliant with home exercise program.  Response to previous treatment: This is patient's first treatment session following initial evaluation.     Pain: 2/10  Location: left lateral foot    Objective     Shayy received therapeutic exercises to develop strength, endurance, ROM, and flexibility for 33 minutes including:    NuStep: 5 minutes  Calf stretch on slant board: 1 minute 45 seconds (with 1 rest break during stretch)  Standing heel/toe raises: x 20 reps  Resisted left ankle dorsiflexion, plantarflexion, inversion, and eversion: red theraband; 2 x 10 reps each  Ankle ABCs (left): 2 sets    Shayy received the following manual therapy techniques for 0 minutes, including:    (not performed)    Shayy participated in neuromuscular re-education activities to improve: Balance, Coordination, and Proprioception for 5 minutes. The following activities were included:    [place] pick ups (left): x 20 reps     Shayy participated in dynamic functional  therapeutic activities to improve functional performance for 0 minutes, including:    (not performed)     Shayy participated in gait training to improve functional mobility and safety for 0 minutes, including:    (not performed)     Shayy received the following direct contact modalities after being cleared for contraindications: Ultrasound:  Shayy received ultrasound to manage pain and inflammation at 100% duty cycle applied to the left lateral foot at an intensity of 1.0 W/cm2  for a duration of 8 minutes. Patient tolerated treatment well without adverse effects. Therapist was in attendance throughout intervention.    Shayy received cold pack for 10 minutes to the left foot (elevated).    Home Exercises Provided and Patient Education Provided     Education provided: Patient educated on the importance of completing skilled physical therapy treatment and home exercise program as prescribed to maximize functional gains.     Written Home Exercises Provided: Patient instructed to cont prior HEP. Exercises were reviewed and Shayy was able to demonstrate them prior to the end of the session.  Shayy demonstrated fair  understanding of the education provided.     See EMR under Patient Instructions for exercises provided prior visit.    Assessment     Patient with good effort throughout treatment. Patient with the greatest difficulty with resisted left eversion. Physical Therapist will continue to progress therapeutic exercise, neuromuscular re-education, therapeutic activities, and gait training as able with manual therapy and modalities utilized as needed.     Shayy isprogressing well towards her goals.   Pt prognosis is Good.     Pt will continue to benefit from skilled outpatient physical therapy to address the deficits listed in the problem list box on initial evaluation, provide pt/family education, and to maximize pt's level of independence in the home and community environment.     Pt's spiritual,  cultural, and educational needs considered and pt agreeable to plan of care and goals.     Anticipated barriers to physical therapy: compliance with home exercise program; natural course of healing     Goals:    Short Term Goals:  Patient will demonstrate independence with home exercise program to ensure carryover of treatment.  Patient will demonstrate left ankle dorsiflexion active range of motion to neutral to improve functional use of the left lower extremity as well as standing posture.   Patient will improve left ankle strength to 4-/5 to improve independence and safety with bed mobility, transfers, and gait.  Patient will ambulate 150 feet with a single point cane with modified independence with normal gait mechanics with equal stance time to improve independence and safety with gait.   Patient will report a reduction in left foot pain from 6/10 to 4/10 at worst to improve quality of life.      Long Term Goals:  Patient will improve left ankle strength to 4/5 to improve independence and safety with bed mobility, transfers, and gait.  Patient will ambulate 300 feet with a single point cane with modified independence including up/down curbs and ramps to improve independence and safety with community ambulation.  Patient will report a reduction in left foot pain from 6/10 to 2/10 at worst to improve quality of life.     Plan     Patient will continue to benefit from skilled physical therapy treatment as prescribed working towards goals listed above to maximize functional potential.       Steven Donato, PT, DPT  9/1/2023

## 2023-09-05 ENCOUNTER — CLINICAL SUPPORT (OUTPATIENT)
Dept: REHABILITATION | Facility: HOSPITAL | Age: 53
End: 2023-09-05
Payer: MEDICARE

## 2023-09-05 DIAGNOSIS — R26.9 ABNORMAL GAIT: ICD-10-CM

## 2023-09-05 DIAGNOSIS — M25.672 DECREASED RANGE OF MOTION OF LEFT ANKLE: Primary | ICD-10-CM

## 2023-09-05 PROCEDURE — 97110 THERAPEUTIC EXERCISES: CPT | Mod: CQ

## 2023-09-05 PROCEDURE — 97035 APP MDLTY 1+ULTRASOUND EA 15: CPT | Mod: CQ

## 2023-09-05 NOTE — PROGRESS NOTES
"[facility]  Physical Therapy Treatment Note    Name: Shayy Hunter  Clinic Number: [id number]    Therapy Diagnosis:   Encounter Diagnoses   Name Primary?    Pain in left foot      Decreased range of motion of left ankle      Abnormal gait Yes     Physician: No ref. provider found    Visit Date: 9/5/2023    Physician Orders: PT Eval and Treat  Medical Diagnosis from Referral: M79.672 (ICD-10-CM) - Pain in left foot  Evaluation Date: 8/28/2023  Authorization Period Expiration: 8/23/2024  Plan of Care Expiration: 10/13/2023  Visit # / Visits authorized: 3/13 (including initial evaluation)  PTA Visit #: 1    Time In: 0846  Time Out: 0932  Total Billable Time: 46 minutes    Precautions: Standard and Fall   Functional Level Prior to Evaluation: modified independent with the use of a single point cane held in the right upper extremity x ~3 years     Subjective     Pt reports: her left foot is doing "a little better". Patient states her pain usually gets worse throughout the day depending on how much she is on it.    She was compliant with home exercise program.  Response to previous treatment: This is patient's first treatment session following initial evaluation.     Pain: 2/10  Location: left lateral foot    Objective     Shayy received therapeutic exercises to develop strength, endurance, ROM, and flexibility for 23 minutes including:    NuStep: 5 minutes  Calf stretch on slant board: 2 minutes (with 1 rest break during stretch at 1 min 15 sec)  Standing heel/toe raises: x 20 reps  Resisted left ankle dorsiflexion, plantarflexion, inversion, and eversion: red theraband; 2 x 10 reps each  Ankle ABCs (left): 2 sets    Shayy received the following manual therapy techniques for 0 minutes, including:    (not performed)    Shayy participated in neuromuscular re-education activities to improve: Balance, Coordination, and Proprioception for 5 minutes. The following activities were " "included:    [place] pick ups (left): x 20 reps     Shayy participated in dynamic functional therapeutic activities to improve functional performance for 0 minutes, including:    (not performed)     Shayy participated in gait training to improve functional mobility and safety for 0 minutes, including:    (not performed)     Shayy received the following direct contact modalities after being cleared for contraindications: Ultrasound:  Shayy received ultrasound to manage pain and inflammation at 100% duty cycle applied to the left lateral foot at an intensity of 1.0 W/cm2  for a duration of 8 minutes. Patient tolerated treatment well without adverse effects. Therapist was in attendance throughout intervention.    Shayy received cold pack for 10 minutes to the left foot (elevated).    Home Exercises Provided and Patient Education Provided     Education provided: Patient educated on the importance of completing skilled physical therapy treatment and home exercise program as prescribed to maximize functional gains.     Written Home Exercises Provided: Patient instructed to cont prior HEP. Exercises were reviewed and Shayy was able to demonstrate them prior to the end of the session.  Shayy demonstrated fair  understanding of the education provided.     See EMR under Patient Instructions for exercises provided prior visit.    Assessment     Patient with good effort throughout treatment. Patient able to perform all exercises with no complaint of increase in pain noted. Patient stated her left foot was feeling a "little aggravated" after exercises but the ice pack post treatment helped. Physical Therapist Assistant will continue to progress therapeutic exercise, neuromuscular re-education, therapeutic activities, and gait training as able with manual therapy and modalities utilized as needed.     Shayy isprogressing well towards her goals.   Pt prognosis is Good.     Pt will continue to benefit from skilled " outpatient physical therapy to address the deficits listed in the problem list box on initial evaluation, provide pt/family education, and to maximize pt's level of independence in the home and community environment.     Pt's spiritual, cultural, and educational needs considered and pt agreeable to plan of care and goals.     Anticipated barriers to physical therapy: compliance with home exercise program; natural course of healing     Goals:    Short Term Goals:  Patient will demonstrate independence with home exercise program to ensure carryover of treatment.  Patient will demonstrate left ankle dorsiflexion active range of motion to neutral to improve functional use of the left lower extremity as well as standing posture.   Patient will improve left ankle strength to 4-/5 to improve independence and safety with bed mobility, transfers, and gait.  Patient will ambulate 150 feet with a single point cane with modified independence with normal gait mechanics with equal stance time to improve independence and safety with gait.   Patient will report a reduction in left foot pain from 6/10 to 4/10 at worst to improve quality of life.      Long Term Goals:  Patient will improve left ankle strength to 4/5 to improve independence and safety with bed mobility, transfers, and gait.  Patient will ambulate 300 feet with a single point cane with modified independence including up/down curbs and ramps to improve independence and safety with community ambulation.  Patient will report a reduction in left foot pain from 6/10 to 2/10 at worst to improve quality of life.     Plan     Patient will continue to benefit from skilled physical therapy treatment as prescribed working towards goals listed above to maximize functional potential.       SHAHAB Turk  9/5/2023

## 2023-09-12 ENCOUNTER — TELEPHONE (OUTPATIENT)
Dept: FAMILY MEDICINE | Facility: CLINIC | Age: 53
End: 2023-09-12
Payer: MEDICARE

## 2023-09-12 ENCOUNTER — APPOINTMENT (OUTPATIENT)
Dept: RADIOLOGY | Facility: CLINIC | Age: 53
End: 2023-09-12
Attending: INTERNAL MEDICINE
Payer: MEDICARE

## 2023-09-12 ENCOUNTER — CLINICAL SUPPORT (OUTPATIENT)
Dept: REHABILITATION | Facility: HOSPITAL | Age: 53
End: 2023-09-12
Payer: MEDICARE

## 2023-09-12 ENCOUNTER — OFFICE VISIT (OUTPATIENT)
Dept: FAMILY MEDICINE | Facility: CLINIC | Age: 53
End: 2023-09-12
Payer: MEDICARE

## 2023-09-12 VITALS
HEART RATE: 97 BPM | HEIGHT: 69 IN | WEIGHT: 245.81 LBS | RESPIRATION RATE: 18 BRPM | BODY MASS INDEX: 36.41 KG/M2 | DIASTOLIC BLOOD PRESSURE: 85 MMHG | TEMPERATURE: 97 F | OXYGEN SATURATION: 97 % | SYSTOLIC BLOOD PRESSURE: 143 MMHG

## 2023-09-12 DIAGNOSIS — M54.50 LOW BACK PAIN, UNSPECIFIED BACK PAIN LATERALITY, UNSPECIFIED CHRONICITY, UNSPECIFIED WHETHER SCIATICA PRESENT: ICD-10-CM

## 2023-09-12 DIAGNOSIS — E53.8 VITAMIN B12 DEFICIENCY: ICD-10-CM

## 2023-09-12 DIAGNOSIS — R26.9 ABNORMAL GAIT: ICD-10-CM

## 2023-09-12 DIAGNOSIS — Z13.1 SCREENING FOR DIABETES MELLITUS (DM): ICD-10-CM

## 2023-09-12 DIAGNOSIS — R53.83 FATIGUE, UNSPECIFIED TYPE: Primary | ICD-10-CM

## 2023-09-12 DIAGNOSIS — R11.0 NAUSEA: Chronic | ICD-10-CM

## 2023-09-12 DIAGNOSIS — E11.9 DM (DIABETES MELLITUS): ICD-10-CM

## 2023-09-12 DIAGNOSIS — M19.072 OSTEOARTHRITIS OF LEFT FOOT, UNSPECIFIED OSTEOARTHRITIS TYPE: ICD-10-CM

## 2023-09-12 DIAGNOSIS — M25.672 DECREASED RANGE OF MOTION OF LEFT ANKLE: Primary | ICD-10-CM

## 2023-09-12 DIAGNOSIS — M62.838 MUSCLE SPASM: ICD-10-CM

## 2023-09-12 LAB
ANION GAP SERPL CALCULATED.3IONS-SCNC: 8 MMOL/L (ref 7–16)
BASOPHILS # BLD AUTO: 0.06 K/UL (ref 0–0.2)
BASOPHILS NFR BLD AUTO: 1.1 % (ref 0–1)
BUN SERPL-MCNC: 9 MG/DL (ref 7–18)
BUN/CREAT SERPL: 10 (ref 6–20)
CALCIUM SERPL-MCNC: 9 MG/DL (ref 8.5–10.1)
CHLORIDE SERPL-SCNC: 106 MMOL/L (ref 98–107)
CO2 SERPL-SCNC: 28 MMOL/L (ref 21–32)
CREAT SERPL-MCNC: 0.91 MG/DL (ref 0.55–1.02)
DIFFERENTIAL METHOD BLD: ABNORMAL
EGFR (NO RACE VARIABLE) (RUSH/TITUS): 77 ML/MIN/1.73M2
EOSINOPHIL # BLD AUTO: 0.15 K/UL (ref 0–0.5)
EOSINOPHIL NFR BLD AUTO: 2.8 % (ref 1–4)
ERYTHROCYTE [DISTWIDTH] IN BLOOD BY AUTOMATED COUNT: 13.3 % (ref 11.5–14.5)
EST. AVERAGE GLUCOSE BLD GHB EST-MCNC: 90 MG/DL
GLUCOSE SERPL-MCNC: 105 MG/DL (ref 74–106)
HBA1C MFR BLD HPLC: 5.3 % (ref 4.5–6.6)
HCT VFR BLD AUTO: 45.1 % (ref 38–47)
HGB BLD-MCNC: 14.6 G/DL (ref 12–16)
IMM GRANULOCYTES # BLD AUTO: 0.02 K/UL (ref 0–0.04)
IMM GRANULOCYTES NFR BLD: 0.4 % (ref 0–0.4)
LYMPHOCYTES # BLD AUTO: 1.53 K/UL (ref 1–4.8)
LYMPHOCYTES NFR BLD AUTO: 28.5 % (ref 27–41)
MCH RBC QN AUTO: 29 PG (ref 27–31)
MCHC RBC AUTO-ENTMCNC: 32.4 G/DL (ref 32–36)
MCV RBC AUTO: 89.5 FL (ref 80–96)
MONOCYTES # BLD AUTO: 0.44 K/UL (ref 0–0.8)
MONOCYTES NFR BLD AUTO: 8.2 % (ref 2–6)
MPC BLD CALC-MCNC: 9.5 FL (ref 9.4–12.4)
NEUTROPHILS # BLD AUTO: 3.17 K/UL (ref 1.8–7.7)
NEUTROPHILS NFR BLD AUTO: 59 % (ref 53–65)
NRBC # BLD AUTO: 0 X10E3/UL
NRBC, AUTO (.00): 0 %
PLATELET # BLD AUTO: 316 K/UL (ref 150–400)
POTASSIUM SERPL-SCNC: 4.2 MMOL/L (ref 3.5–5.1)
RBC # BLD AUTO: 5.04 M/UL (ref 4.2–5.4)
SODIUM SERPL-SCNC: 138 MMOL/L (ref 136–145)
TSH SERPL DL<=0.005 MIU/L-ACNC: 1.23 UIU/ML (ref 0.36–3.74)
VIT B12 SERPL-MCNC: 271 PG/ML (ref 193–986)
WBC # BLD AUTO: 5.37 K/UL (ref 4.5–11)

## 2023-09-12 PROCEDURE — 82607 VITAMIN B-12: CPT | Mod: ,,, | Performed by: CLINICAL MEDICAL LABORATORY

## 2023-09-12 PROCEDURE — 82607 VITAMIN B12: ICD-10-PCS | Mod: ,,, | Performed by: CLINICAL MEDICAL LABORATORY

## 2023-09-12 PROCEDURE — 80048 BASIC METABOLIC PNL TOTAL CA: CPT | Mod: ,,, | Performed by: CLINICAL MEDICAL LABORATORY

## 2023-09-12 PROCEDURE — 99214 PR OFFICE/OUTPT VISIT, EST, LEVL IV, 30-39 MIN: ICD-10-PCS | Mod: ,,, | Performed by: INTERNAL MEDICINE

## 2023-09-12 PROCEDURE — 84443 TSH: ICD-10-PCS | Mod: ,,, | Performed by: CLINICAL MEDICAL LABORATORY

## 2023-09-12 PROCEDURE — 97110 THERAPEUTIC EXERCISES: CPT | Mod: CQ

## 2023-09-12 PROCEDURE — 83036 HEMOGLOBIN GLYCOSYLATED A1C: CPT | Mod: ,,, | Performed by: CLINICAL MEDICAL LABORATORY

## 2023-09-12 PROCEDURE — 84443 ASSAY THYROID STIM HORMONE: CPT | Mod: ,,, | Performed by: CLINICAL MEDICAL LABORATORY

## 2023-09-12 PROCEDURE — 72100 X-RAY EXAM L-S SPINE 2/3 VWS: CPT | Mod: TC,RHCUB | Performed by: INTERNAL MEDICINE

## 2023-09-12 PROCEDURE — 97035 APP MDLTY 1+ULTRASOUND EA 15: CPT | Mod: CQ

## 2023-09-12 PROCEDURE — 85025 COMPLETE CBC W/AUTO DIFF WBC: CPT | Mod: ,,, | Performed by: CLINICAL MEDICAL LABORATORY

## 2023-09-12 PROCEDURE — 80048 BASIC METABOLIC PANEL: ICD-10-PCS | Mod: ,,, | Performed by: CLINICAL MEDICAL LABORATORY

## 2023-09-12 PROCEDURE — 85025 CBC WITH DIFFERENTIAL: ICD-10-PCS | Mod: ,,, | Performed by: CLINICAL MEDICAL LABORATORY

## 2023-09-12 PROCEDURE — 99214 OFFICE O/P EST MOD 30 MIN: CPT | Mod: ,,, | Performed by: INTERNAL MEDICINE

## 2023-09-12 PROCEDURE — 83036 HEMOGLOBIN A1C: ICD-10-PCS | Mod: ,,, | Performed by: CLINICAL MEDICAL LABORATORY

## 2023-09-12 NOTE — PROGRESS NOTES
"[facility]  Physical Therapy Treatment Note    Name: Shayy Hunter  Clinic Number: [id number]    Therapy Diagnosis:   Encounter Diagnoses   Name Primary?    Pain in left foot      Decreased range of motion of left ankle      Abnormal gait Yes     Physician: Beto Segal DPM    Visit Date: 9/12/2023    Physician Orders: PT Eval and Treat  Medical Diagnosis from Referral: M79.672 (ICD-10-CM) - Pain in left foot  Evaluation Date: 8/28/2023  Authorization Period Expiration: 8/23/2024  Plan of Care Expiration: 10/13/2023  Visit # / Visits authorized: 4/13 (including initial evaluation)  PTA Visit #: 2    Time In: 1411 (Patient arrived to treatment 10 minutes late)  Time Out: 1456  Total Billable Time: 45 minutes    Precautions: Standard and Fall   Functional Level Prior to Evaluation: modified independent with the use of a single point cane held in the right upper extremity x ~3 years     Subjective     Pt reports: she can tell her left foot is doing better, but is still having some pain "in that same little spot." Patient states her back has been bothering her as of recent and it "feels like it is going to catch on me." Patient states she was late due to thinking her appointment time was 215 instead of 2.    She was compliant with home exercise program.  Response to previous treatment: This is patient's first treatment session following initial evaluation.     Pain: 2/10  Location: left lateral foot    Objective     Shayy received therapeutic exercises to develop strength, endurance, ROM, and flexibility for 24 minutes including:    NuStep: 5 minutes  Calf stretch on slant board: 2 minutes (with multiple rest breaks between today)  Standing heel/toe raises: x 20 reps  Resisted left ankle dorsiflexion, plantarflexion, inversion, and eversion: red theraband; 2 x 10 reps each  Ankle ABCs (left): x 2 sets    Shayy received the following manual therapy techniques for 0 minutes, " including:    (not performed)    Shayy participated in neuromuscular re-education activities to improve: Balance, Coordination, and Proprioception for 5 minutes. The following activities were included:    [place] pick ups (left): x 20 reps     Shayy participated in dynamic functional therapeutic activities to improve functional performance for 0 minutes, including:    (not performed)     Shayy participated in gait training to improve functional mobility and safety for 0 minutes, including:    (not performed)     Shayy received the following direct contact modalities after being cleared for contraindications: Ultrasound:  Shayy received ultrasound to manage pain and inflammation at 100% duty cycle applied to the left lateral foot at an intensity of 1.0 W/cm2  for a duration of 8 minutes. Patient tolerated treatment well without adverse effects. Therapist was in attendance throughout intervention.    Shayy received cold pack for 8 minutes to the left foot (elevated).    Home Exercises Provided and Patient Education Provided     Education provided: Patient educated on the importance of completing skilled physical therapy treatment and home exercise program as prescribed to maximize functional gains.     Written Home Exercises Provided: Patient instructed to cont prior HEP. Exercises were reviewed and Shayy was able to demonstrate them prior to the end of the session.  Shayy demonstrated fair  understanding of the education provided.     See EMR under Patient Instructions for exercises provided prior visit.    Assessment     Patient with good effort throughout treatment. Patient exhibited significant low back pain while being treated for her left foot this visit. Patient able to perform all exercises with no complaint of increase in pain in left foot noted. Cold pack applied to left foot post treatment to decrease pain. Physical Therapist Assistant will continue to progress therapeutic exercise,  neuromuscular re-education, therapeutic activities, and gait training as able with manual therapy and modalities utilized as needed.     Shayy isprogressing well towards her goals.   Pt prognosis is Good.     Pt will continue to benefit from skilled outpatient physical therapy to address the deficits listed in the problem list box on initial evaluation, provide pt/family education, and to maximize pt's level of independence in the home and community environment.     Pt's spiritual, cultural, and educational needs considered and pt agreeable to plan of care and goals.     Anticipated barriers to physical therapy: compliance with home exercise program; natural course of healing     Goals:    Short Term Goals:  Patient will demonstrate independence with home exercise program to ensure carryover of treatment.  Patient will demonstrate left ankle dorsiflexion active range of motion to neutral to improve functional use of the left lower extremity as well as standing posture.   Patient will improve left ankle strength to 4-/5 to improve independence and safety with bed mobility, transfers, and gait.  Patient will ambulate 150 feet with a single point cane with modified independence with normal gait mechanics with equal stance time to improve independence and safety with gait.   Patient will report a reduction in left foot pain from 6/10 to 4/10 at worst to improve quality of life.      Long Term Goals:  Patient will improve left ankle strength to 4/5 to improve independence and safety with bed mobility, transfers, and gait.  Patient will ambulate 300 feet with a single point cane with modified independence including up/down curbs and ramps to improve independence and safety with community ambulation.  Patient will report a reduction in left foot pain from 6/10 to 2/10 at worst to improve quality of life.     Plan     Patient will continue to benefit from skilled physical therapy treatment as prescribed working towards  goals listed above to maximize functional potential.       SHAHAB Turk  9/12/2023

## 2023-09-12 NOTE — TELEPHONE ENCOUNTER
----- Message from Juno Brady MD sent at 9/12/2023  1:09 PM CDT -----  Need to see in  1 week please  abnl results     1451 Pt is scheduled to come in on 09/26/23

## 2023-09-13 RX ORDER — PROMETHAZINE HYDROCHLORIDE 25 MG/1
25 TABLET ORAL EVERY 8 HOURS PRN
Qty: 30 TABLET | Refills: 1 | Status: SHIPPED | OUTPATIENT
Start: 2023-09-13 | End: 2023-10-03 | Stop reason: SDUPTHER

## 2023-09-13 RX ORDER — BACLOFEN 10 MG/1
10 TABLET ORAL 3 TIMES DAILY
Qty: 180 TABLET | Refills: 1 | Status: SHIPPED | OUTPATIENT
Start: 2023-09-13 | End: 2024-01-08 | Stop reason: SDUPTHER

## 2023-09-13 RX ORDER — LORATADINE PSEUDOEPHEDRINE SULFATE 10; 240 MG/1; MG/1
1 TABLET, EXTENDED RELEASE ORAL DAILY
Qty: 20 TABLET | Refills: 0 | COMMUNITY
Start: 2023-09-13 | End: 2024-01-31

## 2023-09-14 ENCOUNTER — CLINICAL SUPPORT (OUTPATIENT)
Dept: REHABILITATION | Facility: HOSPITAL | Age: 53
End: 2023-09-14
Payer: MEDICARE

## 2023-09-14 DIAGNOSIS — R26.9 ABNORMAL GAIT: ICD-10-CM

## 2023-09-14 DIAGNOSIS — M25.672 DECREASED RANGE OF MOTION OF LEFT ANKLE: Primary | ICD-10-CM

## 2023-09-14 PROCEDURE — 97110 THERAPEUTIC EXERCISES: CPT | Mod: CQ

## 2023-09-14 PROCEDURE — 97112 NEUROMUSCULAR REEDUCATION: CPT | Mod: CQ

## 2023-09-14 NOTE — PROGRESS NOTES
[facility]  Physical Therapy Treatment Note    Name: Shayy Hunter  Clinic Number: [id number]    Therapy Diagnosis:   Encounter Diagnoses   Name Primary?    Pain in left foot      Decreased range of motion of left ankle      Abnormal gait Yes     Physician: Beto Segal DPM    Visit Date: 9/14/2023    Physician Orders: PT Eval and Treat  Medical Diagnosis from Referral: M79.672 (ICD-10-CM) - Pain in left foot  Evaluation Date: 8/28/2023  Authorization Period Expiration: 8/23/2024  Plan of Care Expiration: 10/13/2023  Visit # / Visits authorized: 5/13 (including initial evaluation)  PTA Visit #: 3    Time In: 1358 (patient's treatment started before patient was checked in)    Time Out: 1436  Total Billable Time: 38 minutes    Precautions: Standard and Fall   Functional Level Prior to Evaluation: modified independent with the use of a single point cane held in the right upper extremity x ~3 years     Subjective     Pt reports: Patient reports that her foot is doing better, but she is still having pain in her back that hurts more than her foot.     She was compliant with home exercise program.  Response to previous treatment: This is patient's first treatment session following initial evaluation.     Pain: 2/10  Location: left lateral foot    Objective     Shayy received therapeutic exercises to develop strength, endurance, ROM, and flexibility for 30 minutes including:  NuStep: 5 minutes  Calf stretch on slant board: 2 minutes   Standing heel/toe raises: x 20 reps  Resisted left ankle dorsiflexion, plantarflexion, inversion, and eversion: red theraband; 2 x 10 reps each    Shayy received the following manual therapy techniques for 0 minutes, including:  (not performed)    Shayy participated in neuromuscular re-education activities to improve: Balance, Coordination, and Proprioception for 8 minutes. The following activities were included:  [place] pick ups (left): x 20  reps   Ankle ABCs (left): x 2 sets    Shayy participated in dynamic functional therapeutic activities to improve functional performance for 0 minutes, including:  (not performed)     Shayy participated in gait training to improve functional mobility and safety for 0 minutes, including:  (not performed)     Shayy received the following direct contact modalities after being cleared for contraindications: Ultrasound:  Shayy received ultrasound to manage pain and inflammation at 100% duty cycle applied to the left lateral foot at an intensity of 1.0 W/cm2  for a duration of 0 minutes. Patient tolerated treatment well without adverse effects. Therapist was in attendance throughout intervention.    Shayy received cold pack for 0 minutes to the left foot (elevated).    Home Exercises Provided and Patient Education Provided     Education provided: Patient educated on the importance of completing skilled physical therapy treatment and home exercise program as prescribed to maximize functional gains.     Written Home Exercises Provided: Patient instructed to cont prior HEP. Exercises were reviewed and Shayy was able to demonstrate them prior to the end of the session.  Shayy demonstrated fair  understanding of the education provided.     See EMR under Patient Instructions for exercises provided prior visit.    Assessment     Patient with good effort throughout treatment and reported she would like to ice her foot at home this afternoon. Patient exhibited significant low back pain while being treated for her left foot this visit. Physical Therapist Assistant will continue to progress therapeutic exercise, neuromuscular re-education, therapeutic activities, and gait training as able with manual therapy and modalities utilized as needed.     Shayy isprogressing well towards her goals.   Pt prognosis is Good.     Pt will continue to benefit from skilled outpatient physical therapy to address the deficits listed in  the problem list box on initial evaluation, provide pt/family education, and to maximize pt's level of independence in the home and community environment.     Pt's spiritual, cultural, and educational needs considered and pt agreeable to plan of care and goals.     Anticipated barriers to physical therapy: compliance with home exercise program; natural course of healing     Goals:    Short Term Goals:  Patient will demonstrate independence with home exercise program to ensure carryover of treatment.  Patient will demonstrate left ankle dorsiflexion active range of motion to neutral to improve functional use of the left lower extremity as well as standing posture.   Patient will improve left ankle strength to 4-/5 to improve independence and safety with bed mobility, transfers, and gait.  Patient will ambulate 150 feet with a single point cane with modified independence with normal gait mechanics with equal stance time to improve independence and safety with gait.   Patient will report a reduction in left foot pain from 6/10 to 4/10 at worst to improve quality of life.      Long Term Goals:  Patient will improve left ankle strength to 4/5 to improve independence and safety with bed mobility, transfers, and gait.  Patient will ambulate 300 feet with a single point cane with modified independence including up/down curbs and ramps to improve independence and safety with community ambulation.  Patient will report a reduction in left foot pain from 6/10 to 2/10 at worst to improve quality of life.     Plan     Patient will continue to benefit from skilled physical therapy treatment as prescribed working towards goals listed above to maximize functional potential.       SHAHAB Elena  9/14/2023

## 2023-09-19 ENCOUNTER — CLINICAL SUPPORT (OUTPATIENT)
Dept: REHABILITATION | Facility: HOSPITAL | Age: 53
End: 2023-09-19
Payer: MEDICARE

## 2023-09-19 DIAGNOSIS — R26.9 ABNORMAL GAIT: ICD-10-CM

## 2023-09-19 DIAGNOSIS — M25.672 DECREASED RANGE OF MOTION OF LEFT ANKLE: Primary | ICD-10-CM

## 2023-09-19 PROCEDURE — 97110 THERAPEUTIC EXERCISES: CPT | Mod: CQ

## 2023-09-19 PROCEDURE — 97112 NEUROMUSCULAR REEDUCATION: CPT | Mod: CQ

## 2023-09-19 NOTE — PROGRESS NOTES
Subjective:       Patient ID: Shayy Hunter is a 52 y.o. female.    Chief Complaint: Back Pain and Medication Refill  Patient presents with severe low back pain left low back pain is chronic patient rates the back pain a 10/10 patient complains of chronic fatigue which has been getting worse.  Patient also complains of postnasal drip runny nose and nasal congestion this has chronic allergic rhinitis.  For low back pain will refer to low back specialist Dr. Genao, refer to physical therapy, x-ray lumbosacral spine.  For the chronic fatigue will order CBC vitamin B12 and TSH.  Four allergic rhinitis start Claritin D 10 mg 1 p.o. q.day.  Patient does have chronic muscle spasms in the lower back as well will order baclofen 10 mg 1 p.o. 3 times a day p.r.n. muscle spasm.  Back Pain  Pertinent negatives include no abdominal pain, chest pain or fever.   Medication Refill  Pertinent negatives include no abdominal pain, chest pain, fatigue or fever.     .    Current Medications:    Current Outpatient Medications:     cetirizine (ZYRTEC) 10 MG tablet, Take 1 tablet (10 mg total) by mouth once daily., Disp: 90 tablet, Rfl: 1    citalopram (CELEXA) 10 MG tablet, Take 1 tablet (10 mg total) by mouth once daily., Disp: 30 tablet, Rfl: 1    dexlansoprazole (DEXILANT) 60 mg capsule, Take 1 capsule (60 mg total) by mouth once daily., Disp: 90 capsule, Rfl: 3    fluticasone propionate (FLONASE) 50 mcg/actuation nasal spray, 2 sprays (100 mcg total) by Each Nostril route daily as needed for Rhinitis., Disp: 16 g, Rfl: 3    furosemide (LASIX) 20 MG tablet, Take 1 tablet (20 mg total) by mouth once daily., Disp: 90 tablet, Rfl: 1    gabapentin (NEURONTIN) 300 MG capsule, 1 capsule before bedtime Orally Twice a day, Disp: , Rfl:     hydrOXYzine (ATARAX) 50 MG tablet, 1 tablet as needed Orally every 6 hrs, Disp: , Rfl:     lactulose (CHRONULAC) 10 gram/15 mL solution, Take 15 mLs (10 g total) by mouth 2 (two) times daily as needed  (constipation)., Disp: 237 mL, Rfl: 0    lamoTRIgine (LAMICTAL) 200 MG tablet, , Disp: , Rfl:     loratadine (CLARITIN) 10 mg tablet, Take 1 tablet (10 mg total) by mouth once daily., Disp: 30 tablet, Rfl: 0    meloxicam (MOBIC) 15 MG tablet, Take 1 tablet (15 mg total) by mouth once daily., Disp: 90 tablet, Rfl: 1    mirtazapine (REMERON) 15 MG tablet, Take 1 tablet (15 mg total) by mouth every evening., Disp: 90 tablet, Rfl: 1    oxycodone-acetaminophen  mg (PERCOCET)  mg per tablet, Take 1 tablet by mouth daily as needed., Disp: , Rfl:     potassium chloride (KLOR-CON) 8 MEQ TbSR, Take 1 tablet (8 mEq total) by mouth once daily., Disp: 90 tablet, Rfl: 1    prazosin (MINIPRESS) 1 MG Cap, , Disp: , Rfl:     pregabalin (LYRICA) 150 MG capsule, Take 2 capsules (300 mg total) by mouth once daily., Disp: 30 capsule, Rfl: 1    topiramate (TOPAMAX) 100 MG tablet, Take 1 tablet by mouth 2 (two) times a day., Disp: , Rfl:     ziprasidone (GEODON) 60 MG Cap, Take 1 capsule (60 mg total) by mouth once daily., Disp: 90 capsule, Rfl: 1    baclofen (LIORESAL) 10 MG tablet, Take 1 tablet (10 mg total) by mouth 3 (three) times daily., Disp: 180 tablet, Rfl: 1    loratadine-pseudoephedrine  mg (CLARITIN-D 24 HOUR)  mg per 24 hr tablet, Take 1 tablet by mouth once daily., Disp: 20 tablet, Rfl: 0    promethazine (PHENERGAN) 25 MG tablet, Take 1 tablet (25 mg total) by mouth every 8 (eight) hours as needed for Nausea., Disp: 30 tablet, Rfl: 1           Review of Systems   Constitutional:  Negative for appetite change, fatigue and fever.   Respiratory:  Negative for shortness of breath.    Cardiovascular:  Negative for chest pain.   Gastrointestinal:  Negative for abdominal pain and constipation.   Endocrine: Negative for polydipsia, polyphagia and polyuria.   Genitourinary:  Negative for difficulty urinating, frequency and hot flashes.   Musculoskeletal:  Positive for back pain.   Allergic/Immunologic:  "Negative for environmental allergies.   Neurological:  Negative for dizziness and light-headedness.   Psychiatric/Behavioral:  Negative for agitation.                 Vitals:    09/12/23 1101 09/12/23 1102   BP: (!) 141/83 (!) 143/85   BP Location: Right arm Left arm   Patient Position: Sitting    BP Method: Large (Automatic)    Pulse: 97    Resp: 18    Temp: 97.3 °F (36.3 °C)    TempSrc: Temporal    SpO2: 97%    Weight: 111.5 kg (245 lb 12.8 oz)    Height: 5' 9" (1.753 m)         Physical Exam  Vitals and nursing note reviewed.   Constitutional:       Appearance: Normal appearance.   Cardiovascular:      Rate and Rhythm: Normal rate and regular rhythm.      Pulses: Normal pulses.      Heart sounds: Normal heart sounds.   Pulmonary:      Effort: Pulmonary effort is normal.      Breath sounds: Normal breath sounds.   Abdominal:      General: Abdomen is flat. Bowel sounds are normal.      Palpations: Abdomen is soft.   Musculoskeletal:         General: Normal range of motion.   Skin:     General: Skin is warm and dry.   Neurological:      General: No focal deficit present.      Mental Status: She is alert and oriented to person, place, and time. Mental status is at baseline.           Last Labs:     Office Visit on 09/12/2023   Component Date Value    Sodium 09/12/2023 138     Potassium 09/12/2023 4.2     Chloride 09/12/2023 106     CO2 09/12/2023 28     Anion Gap 09/12/2023 8     Glucose 09/12/2023 105     BUN 09/12/2023 9     Creatinine 09/12/2023 0.91     BUN/Creatinine Ratio 09/12/2023 10     Calcium 09/12/2023 9.0     eGFR 09/12/2023 77     Hemoglobin A1C 09/12/2023 5.3     Estimated Average Glucose 09/12/2023 90     Vitamin B12 09/12/2023 271     TSH 09/12/2023 1.230     WBC 09/12/2023 5.37     RBC 09/12/2023 5.04     Hemoglobin 09/12/2023 14.6     Hematocrit 09/12/2023 45.1     MCV 09/12/2023 89.5     MCH 09/12/2023 29.0     MCHC 09/12/2023 32.4     RDW 09/12/2023 13.3     Platelet Count 09/12/2023 316     MPV " 09/12/2023 9.5     Neutrophils % 09/12/2023 59.0     Lymphocytes % 09/12/2023 28.5     Monocytes % 09/12/2023 8.2 (H)     Eosinophils % 09/12/2023 2.8     Basophils % 09/12/2023 1.1 (H)     Immature Granulocytes % 09/12/2023 0.4     nRBC, Auto 09/12/2023 0.0     Neutrophils, Abs 09/12/2023 3.17     Lymphocytes, Absolute 09/12/2023 1.53     Monocytes, Absolute 09/12/2023 0.44     Eosinophils, Absolute 09/12/2023 0.15     Basophils, Absolute 09/12/2023 0.06     Immature Granulocytes, A* 09/12/2023 0.02     nRBC, Absolute 09/12/2023 0.00     Diff Type 09/12/2023 Auto    Hospital Outpatient Visit on 08/30/2023   Component Date Value    Case Report 08/30/2023                      Value:Surgical Pathology                                Case: [id number]                                   Authorizing Provider:  Luiz Hutchinson MD           Collected:           08/30/2023 08:39 AM          Ordering Location:     [facility] -         Received:            08/30/2023 10:25 AM                                 Endoscopy                                                                    Pathologist:           Karlos Wheat MD                                                           Specimens:   A) - Duodenum, biopsy                                                                               B) - Stomach, biopsy                                                                                C) - Esophagus, biopsy\                                                                    Final Diagnosis 08/30/2023                      Value:This result contains rich text formatting which cannot be displayed here.    Gross Description 08/30/2023                      Value:This result contains rich text formatting which cannot be displayed here.    Microscopic Description 08/30/2023                      Value:This result contains rich text formatting which cannot be displayed here.    Laboratory Notes 08/30/2023                       Value:This result contains rich text formatting which cannot be displayed here.       Last Imaging:  X-Ray Lumbar Spine Ap And Lateral  Narrative: EXAMINATION:  XR LUMBAR SPINE AP AND LATERAL    CLINICAL HISTORY:  .  Low back pain, unspecified    COMPARISON:  No previous similar    TECHNIQUE:  Lumbar spine AP and lateral two views    FINDINGS:  Pedicular screws and rods stabilize the pedicles on the left at L4 through S1 and on the right at L5-S1.    There is a minimal anterolisthesis L3 on L4.  There is mild gentle sloping levoscoliosis of the spine centered at L2-L3.    There is mild to moderate degenerative disc narrowing at L3-L4.  There is prominent disc narrowing at L5-S1.  There is scattered mild lumbar spondylosis.  There is moderate facet hypertrophy.  Osteopenia.  There is no focal lytic or blastic lesion.    Surgical clips overlie the gallbladder fossa.  Impression: No acute process    Degenerative disc disease    Postsurgical changes    Electronically signed by: Mychal Batista  Date:    09/12/2023  Time:    11:55         **Labs and x-rays personally reviewed by me    ** reviewed      Objective:        Assessment:       1. Fatigue, unspecified type  CBC Auto Differential    Vitamin B12    TSH    CBC Auto Differential    Vitamin B12    TSH      2. Muscle spasm  baclofen (LIORESAL) 10 MG tablet      3. Nausea  promethazine (PHENERGAN) 25 MG tablet    Refill her Phenergan.      4. Low back pain, unspecified back pain laterality, unspecified chronicity, unspecified whether sciatica present  X-Ray Lumbar Spine Ap And Lateral    Ambulatory referral/consult to [facility]    Ambulatory referral/consult to Physical/Occupational Therapy      5. Vitamin B12 deficiency  Vitamin B12    Vitamin B12      6. Osteoarthritis of left foot, unspecified osteoarthritis type  Basic Metabolic Panel      7. Screening for diabetes mellitus (DM)  Hemoglobin A1C      8. DM (diabetes mellitus)  Hemoglobin A1C            Plan:         @[email or web address] Children[facility] HospitalDANIELWashington [facility] Hospital@

## 2023-09-19 NOTE — PROGRESS NOTES
[facility]  Physical Therapy Treatment Note    Name: Shayy Hunter  Clinic Number: [id number]    Therapy Diagnosis:   Encounter Diagnoses   Name Primary?    Pain in left foot      Decreased range of motion of left ankle      Abnormal gait Yes     Physician: Beto Segal DPM    Visit Date: 9/19/2023    Physician Orders: PT Eval and Treat  Medical Diagnosis from Referral: M79.672 (ICD-10-CM) - Pain in left foot  Evaluation Date: 8/28/2023  Authorization Period Expiration: 8/23/2024  Plan of Care Expiration: 10/13/2023  Visit # / Visits authorized: 6/13 (including initial evaluation)  PTA Visit #: 4    Time In: 1317 (patient's treatment started before patient was checked in)   Time Out: 1356  Total Billable Time: 39 minutes    Precautions: Standard and Fall   Functional Level Prior to Evaluation: modified independent with the use of a single point cane held in the right upper extremity x ~3 years     Subjective     Pt reports: Patient reports no pain in her foot, but still having pain in her hip and back from what she believes is coming from having to wear her boot on her left foot.     She was compliant with home exercise program.  Response to previous treatment: This is patient's first treatment session following initial evaluation.     Pain: 0/10  Location: left lateral foot    Objective     Shayy received therapeutic exercises to develop strength, endurance, ROM, and flexibility for 24 minutes including:  NuStep: 5 minutes  Calf stretch on slant board: 2 minutes   Standing heel/toe raises: x 20 reps  Resisted left ankle dorsiflexion, plantarflexion, inversion, and eversion: red theraband; 2 x 10 reps each    Shayy received the following manual therapy techniques for 0 minutes, including:  (not performed)    Shayy participated in neuromuscular re-education activities to improve: Balance, Coordination, and Proprioception for 15 minutes. The following activities were  included:  [place] pick ups (left): x 20 reps   Ankle ABCs (left): x 2 sets  Single leg stance (left): x 3 reps with 10 second holds each     Shayy participated in dynamic functional therapeutic activities to improve functional performance for 0 minutes, including:  (not performed)     Shayy participated in gait training to improve functional mobility and safety for 0 minutes, including:  (not performed)     Shayy received the following direct contact modalities after being cleared for contraindications: Ultrasound:  Shayy received ultrasound to manage pain and inflammation at 100% duty cycle applied to the left lateral foot at an intensity of 1.0 W/cm2  for a duration of 0 minutes. Patient tolerated treatment well without adverse effects. Therapist was in attendance throughout intervention.    Shayy received cold pack for 0 minutes to the left foot (elevated).    Home Exercises Provided and Patient Education Provided     Education provided: Patient educated on the importance of completing skilled physical therapy treatment and home exercise program as prescribed to maximize functional gains.     Written Home Exercises Provided: Patient instructed to cont prior HEP. Exercises were reviewed and Shayy was able to demonstrate them prior to the end of the session.  Shayy demonstrated fair  understanding of the education provided.     See EMR under Patient Instructions for exercises provided prior visit.    Assessment     Patient with good effort throughout treatment and reported she would like to ice her foot at home this afternoon. Patient exhibited low back and hip pain while being treated for her left foot this visit. Physical Therapist Assistant will continue to progress therapeutic exercise, neuromuscular re-education, therapeutic activities, and gait training as able with manual therapy and modalities utilized as needed.      Shayy isprogressing well towards her goals.   Pt prognosis is Good.     Pt  will continue to benefit from skilled outpatient physical therapy to address the deficits listed in the problem list box on initial evaluation, provide pt/family education, and to maximize pt's level of independence in the home and community environment.     Pt's spiritual, cultural, and educational needs considered and pt agreeable to plan of care and goals.     Anticipated barriers to physical therapy: compliance with home exercise program; natural course of healing     Goals:    Short Term Goals:  Patient will demonstrate independence with home exercise program to ensure carryover of treatment.  Patient will demonstrate left ankle dorsiflexion active range of motion to neutral to improve functional use of the left lower extremity as well as standing posture.   Patient will improve left ankle strength to 4-/5 to improve independence and safety with bed mobility, transfers, and gait.  Patient will ambulate 150 feet with a single point cane with modified independence with normal gait mechanics with equal stance time to improve independence and safety with gait.   Patient will report a reduction in left foot pain from 6/10 to 4/10 at worst to improve quality of life.      Long Term Goals:  Patient will improve left ankle strength to 4/5 to improve independence and safety with bed mobility, transfers, and gait.  Patient will ambulate 300 feet with a single point cane with modified independence including up/down curbs and ramps to improve independence and safety with community ambulation.  Patient will report a reduction in left foot pain from 6/10 to 2/10 at worst to improve quality of life.     Plan     Patient will continue to benefit from skilled physical therapy treatment as prescribed working towards goals listed above to maximize functional potential.       SHAHAB Elena  9/19/2023

## 2023-09-20 ENCOUNTER — PATIENT MESSAGE (OUTPATIENT)
Dept: ADMINISTRATIVE | Facility: HOSPITAL | Age: 53
End: 2023-09-20

## 2023-09-26 ENCOUNTER — OFFICE VISIT (OUTPATIENT)
Dept: FAMILY MEDICINE | Facility: CLINIC | Age: 53
End: 2023-09-26
Payer: MEDICARE

## 2023-09-26 ENCOUNTER — APPOINTMENT (OUTPATIENT)
Dept: RADIOLOGY | Facility: CLINIC | Age: 53
End: 2023-09-26
Attending: INTERNAL MEDICINE
Payer: MEDICARE

## 2023-09-26 VITALS
DIASTOLIC BLOOD PRESSURE: 84 MMHG | OXYGEN SATURATION: 97 % | WEIGHT: 245.19 LBS | BODY MASS INDEX: 36.32 KG/M2 | HEART RATE: 84 BPM | HEIGHT: 69 IN | RESPIRATION RATE: 18 BRPM | TEMPERATURE: 98 F | SYSTOLIC BLOOD PRESSURE: 168 MMHG

## 2023-09-26 DIAGNOSIS — Z01.00 EYE EXAM, ROUTINE: ICD-10-CM

## 2023-09-26 DIAGNOSIS — M47.812 OSTEOARTHRITIS OF CERVICAL SPINE, UNSPECIFIED SPINAL OSTEOARTHRITIS COMPLICATION STATUS: Primary | ICD-10-CM

## 2023-09-26 DIAGNOSIS — M47.812 OSTEOARTHRITIS OF CERVICAL SPINE, UNSPECIFIED SPINAL OSTEOARTHRITIS COMPLICATION STATUS: ICD-10-CM

## 2023-09-26 PROCEDURE — 99214 PR OFFICE/OUTPT VISIT, EST, LEVL IV, 30-39 MIN: ICD-10-PCS | Mod: ,,, | Performed by: INTERNAL MEDICINE

## 2023-09-26 PROCEDURE — 96372 THER/PROPH/DIAG INJ SC/IM: CPT | Mod: ,,, | Performed by: INTERNAL MEDICINE

## 2023-09-26 PROCEDURE — 90677 PNEUMOCOCCAL CONJUGATE VACCINE 20-VALENT: ICD-10-PCS | Mod: ,,, | Performed by: INTERNAL MEDICINE

## 2023-09-26 PROCEDURE — G0009 PNEUMOCOCCAL CONJUGATE VACCINE 20-VALENT: ICD-10-PCS | Mod: ,,, | Performed by: INTERNAL MEDICINE

## 2023-09-26 PROCEDURE — 99214 OFFICE O/P EST MOD 30 MIN: CPT | Mod: ,,, | Performed by: INTERNAL MEDICINE

## 2023-09-26 PROCEDURE — 73502 X-RAY EXAM HIP UNI 2-3 VIEWS: CPT | Mod: TC,RHCUB,RT | Performed by: INTERNAL MEDICINE

## 2023-09-26 PROCEDURE — G0009 ADMIN PNEUMOCOCCAL VACCINE: HCPCS | Mod: ,,, | Performed by: INTERNAL MEDICINE

## 2023-09-26 PROCEDURE — 90677 PCV20 VACCINE IM: CPT | Mod: ,,, | Performed by: INTERNAL MEDICINE

## 2023-09-26 PROCEDURE — 96372 PR INJECTION,THERAP/PROPH/DIAG2ST, IM OR SUBCUT: ICD-10-PCS | Mod: ,,, | Performed by: INTERNAL MEDICINE

## 2023-09-26 RX ORDER — KETOROLAC TROMETHAMINE 30 MG/ML
15 INJECTION, SOLUTION INTRAMUSCULAR; INTRAVENOUS
Status: COMPLETED | OUTPATIENT
Start: 2023-09-26 | End: 2023-09-26

## 2023-09-26 RX ADMIN — KETOROLAC TROMETHAMINE 15 MG: 30 INJECTION, SOLUTION INTRAMUSCULAR; INTRAVENOUS at 11:09

## 2023-09-29 ENCOUNTER — TELEPHONE (OUTPATIENT)
Dept: FAMILY MEDICINE | Facility: CLINIC | Age: 53
End: 2023-09-29
Payer: MEDICARE

## 2023-09-29 NOTE — TELEPHONE ENCOUNTER
----- Message from Juno Brady MD sent at 9/28/2023  1:30 PM CDT -----  Need to see in  1 week please  abnl results     0850 Pt is scheduled to come in on 10/03/23

## 2023-10-03 ENCOUNTER — OFFICE VISIT (OUTPATIENT)
Dept: FAMILY MEDICINE | Facility: CLINIC | Age: 53
End: 2023-10-03
Payer: MEDICARE

## 2023-10-03 VITALS
RESPIRATION RATE: 17 BRPM | DIASTOLIC BLOOD PRESSURE: 90 MMHG | HEIGHT: 69 IN | BODY MASS INDEX: 36.29 KG/M2 | OXYGEN SATURATION: 95 % | WEIGHT: 245 LBS | HEART RATE: 84 BPM | TEMPERATURE: 98 F | SYSTOLIC BLOOD PRESSURE: 130 MMHG

## 2023-10-03 DIAGNOSIS — Z12.31 ENCOUNTER FOR SCREENING MAMMOGRAM FOR MALIGNANT NEOPLASM OF BREAST: ICD-10-CM

## 2023-10-03 DIAGNOSIS — R11.0 NAUSEA: Chronic | ICD-10-CM

## 2023-10-03 DIAGNOSIS — Z12.39 ENCOUNTER FOR SCREENING FOR MALIGNANT NEOPLASM OF BREAST, UNSPECIFIED SCREENING MODALITY: ICD-10-CM

## 2023-10-03 DIAGNOSIS — Z00.00 NORMAL FOOT EXAM: ICD-10-CM

## 2023-10-03 DIAGNOSIS — R60.9 EDEMA, UNSPECIFIED TYPE: ICD-10-CM

## 2023-10-03 DIAGNOSIS — Z12.4 PAP SMEAR FOR CERVICAL CANCER SCREENING: Primary | ICD-10-CM

## 2023-10-03 PROBLEM — M47.812 OSTEOARTHRITIS OF CERVICAL SPINE: Status: ACTIVE | Noted: 2023-10-03

## 2023-10-03 PROBLEM — Z01.00 EYE EXAM, ROUTINE: Status: ACTIVE | Noted: 2023-04-12

## 2023-10-03 PROCEDURE — 99214 OFFICE O/P EST MOD 30 MIN: CPT | Mod: ,,, | Performed by: INTERNAL MEDICINE

## 2023-10-03 PROCEDURE — 99214 PR OFFICE/OUTPT VISIT, EST, LEVL IV, 30-39 MIN: ICD-10-PCS | Mod: ,,, | Performed by: INTERNAL MEDICINE

## 2023-10-03 RX ORDER — PROMETHAZINE HYDROCHLORIDE 25 MG/1
25 TABLET ORAL EVERY 8 HOURS PRN
Qty: 30 TABLET | Refills: 1 | Status: SHIPPED | OUTPATIENT
Start: 2023-10-03 | End: 2024-01-08 | Stop reason: SDUPTHER

## 2023-10-03 RX ORDER — FUROSEMIDE 20 MG/1
20 TABLET ORAL DAILY
Qty: 90 TABLET | Refills: 1 | Status: SHIPPED | OUTPATIENT
Start: 2023-10-03 | End: 2024-01-08 | Stop reason: SDUPTHER

## 2023-10-03 NOTE — PROGRESS NOTES
Subjective:       Patient ID: Shayy Hunter is a 52 y.o. female.    Chief Complaint: Results (Abnl xray), Neck Pain, Back Pain, and Hip Pain (Right hip )  Patient presents with moderate neck pain and also moderate pain of the lower back.  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  I am going to refer the patient to orthopedics.    Patient complains of moderate pain in the right hip will x-ray the right hip also x-ray of the C-spine  Today will give Toradol 15 mg IM the patient states that pain is a 9/10 today.  Health maintenance issues will refer ophthalmology.  Neck Pain   Pertinent negatives include no chest pain or fever.   Back Pain  Pertinent negatives include no abdominal pain, chest pain or fever.   Hip Pain       .    Current Medications:    Current Outpatient Medications:     baclofen (LIORESAL) 10 MG tablet, Take 1 tablet (10 mg total) by mouth 3 (three) times daily., Disp: 180 tablet, Rfl: 1    cetirizine (ZYRTEC) 10 MG tablet, Take 1 tablet (10 mg total) by mouth once daily., Disp: 90 tablet, Rfl: 1    citalopram (CELEXA) 10 MG tablet, Take 1 tablet (10 mg total) by mouth once daily., Disp: 30 tablet, Rfl: 1    dexlansoprazole (DEXILANT) 60 mg capsule, Take 1 capsule (60 mg total) by mouth once daily., Disp: 90 capsule, Rfl: 3    fluticasone propionate (FLONASE) 50 mcg/actuation nasal spray, 2 sprays (100 mcg total) by Each Nostril route daily as needed for Rhinitis., Disp: 16 g, Rfl: 3    furosemide (LASIX) 20 MG tablet, Take 1 tablet (20 mg total) by mouth once daily., Disp: 90 tablet, Rfl: 1    gabapentin (NEURONTIN) 300 MG capsule, 1 capsule before bedtime Orally Twice a day, Disp: , Rfl:     hydrOXYzine (ATARAX) 50 MG tablet, 1 tablet as needed Orally every 6 hrs, Disp: , Rfl:     lactulose (CHRONULAC) 10 gram/15 mL solution, Take 15 mLs (10 g total) by mouth 2 (two) times daily as needed (constipation)., Disp: 237 mL, Rfl: 0    lamoTRIgine (LAMICTAL) 200 MG tablet, ,  Disp: , Rfl:     loratadine (CLARITIN) 10 mg tablet, Take 1 tablet (10 mg total) by mouth once daily., Disp: 30 tablet, Rfl: 0    loratadine-pseudoephedrine  mg (CLARITIN-D 24 HOUR)  mg per 24 hr tablet, Take 1 tablet by mouth once daily., Disp: 20 tablet, Rfl: 0    meloxicam (MOBIC) 15 MG tablet, Take 1 tablet (15 mg total) by mouth once daily., Disp: 90 tablet, Rfl: 1    mirtazapine (REMERON) 15 MG tablet, Take 1 tablet (15 mg total) by mouth every evening., Disp: 90 tablet, Rfl: 1    oxycodone-acetaminophen  mg (PERCOCET)  mg per tablet, Take 1 tablet by mouth daily as needed., Disp: , Rfl:     potassium chloride (KLOR-CON) 8 MEQ TbSR, Take 1 tablet (8 mEq total) by mouth once daily., Disp: 90 tablet, Rfl: 1    prazosin (MINIPRESS) 1 MG Cap, , Disp: , Rfl:     pregabalin (LYRICA) 150 MG capsule, Take 2 capsules (300 mg total) by mouth once daily., Disp: 30 capsule, Rfl: 1    promethazine (PHENERGAN) 25 MG tablet, Take 1 tablet (25 mg total) by mouth every 8 (eight) hours as needed for Nausea., Disp: 30 tablet, Rfl: 1    topiramate (TOPAMAX) 100 MG tablet, Take 1 tablet by mouth 2 (two) times a day., Disp: , Rfl:     ziprasidone (GEODON) 60 MG Cap, Take 1 capsule (60 mg total) by mouth once daily., Disp: 90 capsule, Rfl: 1           Review of Systems   Constitutional:  Negative for appetite change, fatigue and fever.   Respiratory:  Negative for shortness of breath.    Cardiovascular:  Negative for chest pain.   Gastrointestinal:  Negative for abdominal pain and constipation.   Endocrine: Negative for polydipsia, polyphagia and polyuria.   Genitourinary:  Negative for difficulty urinating, frequency and hot flashes.   Musculoskeletal:  Positive for back pain and neck pain.   Allergic/Immunologic: Negative for environmental allergies.   Neurological:  Negative for dizziness and light-headedness.   Psychiatric/Behavioral:  Negative for agitation.                 Vitals:    09/26/23 0931  "09/26/23 0957   BP: (!) 142/91 (!) 168/84   BP Location: Left arm    Patient Position: Sitting    BP Method: X-Large (Automatic)    Pulse: 84    Resp: 18    Temp: 98 °F (36.7 °C)    TempSrc: Temporal    SpO2: 97%    Weight: 111.2 kg (245 lb 3.2 oz)    Height: 5' 9" (1.753 m)         Physical Exam  Vitals and nursing note reviewed.   Constitutional:       Appearance: Normal appearance.   Cardiovascular:      Rate and Rhythm: Normal rate and regular rhythm.      Pulses: Normal pulses.      Heart sounds: Normal heart sounds.   Pulmonary:      Effort: Pulmonary effort is normal.      Breath sounds: Normal breath sounds.   Abdominal:      General: Abdomen is flat. Bowel sounds are normal.      Palpations: Abdomen is soft.   Musculoskeletal:         General: Normal range of motion.   Skin:     General: Skin is warm and dry.   Neurological:      General: No focal deficit present.      Mental Status: She is alert and oriented to person, place, and time. Mental status is at baseline.           Last Labs:     Office Visit on 09/12/2023   Component Date Value    Sodium 09/12/2023 138     Potassium 09/12/2023 4.2     Chloride 09/12/2023 106     CO2 09/12/2023 28     Anion Gap 09/12/2023 8     Glucose 09/12/2023 105     BUN 09/12/2023 9     Creatinine 09/12/2023 0.91     BUN/Creatinine Ratio 09/12/2023 10     Calcium 09/12/2023 9.0     eGFR 09/12/2023 77     Hemoglobin A1C 09/12/2023 5.3     Estimated Average Glucose 09/12/2023 90     Vitamin B12 09/12/2023 271     TSH 09/12/2023 1.230     WBC 09/12/2023 5.37     RBC 09/12/2023 5.04     Hemoglobin 09/12/2023 14.6     Hematocrit 09/12/2023 45.1     MCV 09/12/2023 89.5     MCH 09/12/2023 29.0     MCHC 09/12/2023 32.4     RDW 09/12/2023 13.3     Platelet Count 09/12/2023 316     MPV 09/12/2023 9.5     Neutrophils % 09/12/2023 59.0     Lymphocytes % 09/12/2023 28.5     Monocytes % 09/12/2023 8.2 (H)     Eosinophils % 09/12/2023 2.8     Basophils % 09/12/2023 1.1 (H)     Immature " Granulocytes % 09/12/2023 0.4     nRBC, Auto 09/12/2023 0.0     Neutrophils, Abs 09/12/2023 3.17     Lymphocytes, Absolute 09/12/2023 1.53     Monocytes, Absolute 09/12/2023 0.44     Eosinophils, Absolute 09/12/2023 0.15     Basophils, Absolute 09/12/2023 0.06     Immature Granulocytes, A* 09/12/2023 0.02     nRBC, Absolute 09/12/2023 0.00     Diff Type 09/12/2023 Auto    Hospital Outpatient Visit on 08/30/2023   Component Date Value    Case Report 08/30/2023                      Value:Surgical Pathology                                Case: [id number]                                   Authorizing Provider:  Luiz Hutchinson MD           Collected:           08/30/2023 08:39 AM          Ordering Location:     [facility] -         Received:            08/30/2023 10:25 AM                                 Endoscopy                                                                    Pathologist:           Karlos Wheat MD                                                           Specimens:   A) - Duodenum, biopsy                                                                               B) - Stomach, biopsy                                                                                C) - Esophagus, biopsy\                                                                    Final Diagnosis 08/30/2023                      Value:This result contains rich text formatting which cannot be displayed here.    Gross Description 08/30/2023                      Value:This result contains rich text formatting which cannot be displayed here.    Microscopic Description 08/30/2023                      Value:This result contains rich text formatting which cannot be displayed here.    Laboratory Notes 08/30/2023                      Value:This result contains rich text formatting which cannot be displayed here.       Last Imaging:  X-Ray Hip 2 or 3 views Right (with Pelvis when performed)  Narrative: EXAMINATION:  XR HIP WITH  PELVIS WHEN PERFORMED, 2 OR 3  VIEWS RIGHT    CLINICAL HISTORY:  Spondylosis without myelopathy or radiculopathy, cervical region    COMPARISON:  None    TECHNIQUE:  Single frontal view of the pelvis as well as frontal and frogleg lateral views of the right hip.    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 Mild degenerative change of the hips.  No acute fracture or dislocation.  Bilateral pedicle screws at L5-S1 as well as pedicle screw on the left at 4 with bilateral posterior rods.  Disc spacer device suggested at the L5-S1 level.  Laminectomy change suggested at the L5 level.  Impression: As above.    Point of Service: [facility]    Electronically signed by: Tyrone Asher  Date:    09/26/2023  Time:    10:58  X-Ray Cervical Spine 2 or 3 Views  Narrative: EXAMINATION:  XR CERVICAL SPINE 2 OR 3 VIEWS    CLINICAL HISTORY:  Spondylosis without myelopathy or radiculopathy, cervical region    COMPARISON:  None    TECHNIQUE:  Frontal, lateral, and open-mouth odontoid views of the cervical spine.    FINDINGS:  Anterior fusion hardware C4 through C7 with osseous fusion.  Straightening of normal cervical lordosis.  Impression: As above.    Point of Service: [facility]    Electronically signed by: Tyrone Asher  Date:    09/26/2023  Time:    10:55         **Labs and x-rays personally reviewed by me    ** reviewed      Objective:        Assessment:       1. Osteoarthritis of cervical spine, unspecified spinal osteoarthritis complication status  ketorolac injection 15 mg    X-Ray Hip 2 or 3 views Right (with Pelvis when performed)    X-Ray Cervical Spine 2 or 3 Views    Ambulatory referral/consult to Orthopedics      2. Eye exam, routine  Ambulatory referral/consult to Ophthalmology           Plan:         [unfilled]

## 2023-10-10 PROBLEM — Z00.00 NORMAL FOOT EXAM: Status: ACTIVE | Noted: 2023-10-10

## 2023-10-10 PROBLEM — Z12.39 ENCOUNTER FOR SCREENING FOR MALIGNANT NEOPLASM OF BREAST: Status: ACTIVE | Noted: 2023-04-12

## 2023-10-10 PROBLEM — Z12.31 ENCOUNTER FOR SCREENING MAMMOGRAM FOR MALIGNANT NEOPLASM OF BREAST: Status: ACTIVE | Noted: 2023-04-12

## 2023-10-10 PROBLEM — Z12.4 PAP SMEAR FOR CERVICAL CANCER SCREENING: Status: ACTIVE | Noted: 2023-04-12

## 2023-10-10 NOTE — PROGRESS NOTES
Subjective:       Patient ID: Shayy Hunter is a 52 y.o. female.    Chief Complaint: Results (Abnl lab results ), Back Pain, Hip Pain, Neck Pain, and Mole (On back/ hurting )  Patient presents complain of severe pain in the right hip.  X-ray shows a possible nerve impingement.  Otherwise sleeps nor relatively well she also complains of intermittent swelling extremities and she complains of chronic nausea.  Today plan is to refill Phenergan 25 mg 1 p.o. q.8 hours p.r.n. for chronic leg swelling plan will be Lasix 20 mg 1 p.o. q.day. for the OA in the wife if she does see ortho.    Several health maintenance issues we will address today   Refer to podiatry  For ophthalmology   Refer to gyn for Pap smear   Refer the patient for mammogram.  Back Pain  Pertinent negatives include no abdominal pain, chest pain or fever.   Hip Pain     Neck Pain   Pertinent negatives include no chest pain or fever.   Mole  Associated symptoms include neck pain. Pertinent negatives include no abdominal pain, chest pain, fatigue or fever.     .    Current Medications:    Current Outpatient Medications:     baclofen (LIORESAL) 10 MG tablet, Take 1 tablet (10 mg total) by mouth 3 (three) times daily., Disp: 180 tablet, Rfl: 1    cetirizine (ZYRTEC) 10 MG tablet, Take 1 tablet (10 mg total) by mouth once daily., Disp: 90 tablet, Rfl: 1    citalopram (CELEXA) 10 MG tablet, Take 1 tablet (10 mg total) by mouth once daily., Disp: 30 tablet, Rfl: 1    dexlansoprazole (DEXILANT) 60 mg capsule, Take 1 capsule (60 mg total) by mouth once daily., Disp: 90 capsule, Rfl: 3    fluticasone propionate (FLONASE) 50 mcg/actuation nasal spray, 2 sprays (100 mcg total) by Each Nostril route daily as needed for Rhinitis., Disp: 16 g, Rfl: 3    gabapentin (NEURONTIN) 300 MG capsule, 1 capsule before bedtime Orally Twice a day, Disp: , Rfl:     hydrOXYzine (ATARAX) 50 MG tablet, 1 tablet as needed Orally every 6 hrs, Disp: , Rfl:     lactulose (CHRONULAC) 10  gram/15 mL solution, Take 15 mLs (10 g total) by mouth 2 (two) times daily as needed (constipation)., Disp: 237 mL, Rfl: 0    lamoTRIgine (LAMICTAL) 200 MG tablet, , Disp: , Rfl:     loratadine (CLARITIN) 10 mg tablet, Take 1 tablet (10 mg total) by mouth once daily., Disp: 30 tablet, Rfl: 0    loratadine-pseudoephedrine  mg (CLARITIN-D 24 HOUR)  mg per 24 hr tablet, Take 1 tablet by mouth once daily., Disp: 20 tablet, Rfl: 0    meloxicam (MOBIC) 15 MG tablet, Take 1 tablet (15 mg total) by mouth once daily., Disp: 90 tablet, Rfl: 1    mirtazapine (REMERON) 15 MG tablet, Take 1 tablet (15 mg total) by mouth every evening., Disp: 90 tablet, Rfl: 1    oxycodone-acetaminophen  mg (PERCOCET)  mg per tablet, Take 1 tablet by mouth daily as needed., Disp: , Rfl:     potassium chloride (KLOR-CON) 8 MEQ TbSR, Take 1 tablet (8 mEq total) by mouth once daily., Disp: 90 tablet, Rfl: 1    prazosin (MINIPRESS) 1 MG Cap, , Disp: , Rfl:     pregabalin (LYRICA) 150 MG capsule, Take 2 capsules (300 mg total) by mouth once daily., Disp: 30 capsule, Rfl: 1    topiramate (TOPAMAX) 100 MG tablet, Take 1 tablet by mouth 2 (two) times a day., Disp: , Rfl:     ziprasidone (GEODON) 60 MG Cap, Take 1 capsule (60 mg total) by mouth once daily., Disp: 90 capsule, Rfl: 1    furosemide (LASIX) 20 MG tablet, Take 1 tablet (20 mg total) by mouth once daily., Disp: 90 tablet, Rfl: 1    promethazine (PHENERGAN) 25 MG tablet, Take 1 tablet (25 mg total) by mouth every 8 (eight) hours as needed for Nausea., Disp: 30 tablet, Rfl: 1           Review of Systems   Constitutional:  Negative for appetite change, fatigue and fever.   Respiratory:  Negative for shortness of breath.    Cardiovascular:  Negative for chest pain.   Gastrointestinal:  Negative for abdominal pain and constipation.   Endocrine: Negative for polydipsia, polyphagia and polyuria.   Genitourinary:  Negative for difficulty urinating, frequency and hot flashes.  "  Musculoskeletal:  Positive for back pain and neck pain.   Allergic/Immunologic: Negative for environmental allergies.   Neurological:  Negative for dizziness and light-headedness.   Psychiatric/Behavioral:  Negative for agitation.                 Vitals:    10/03/23 1031 10/03/23 1052   BP: (!) 134/93 (!) 130/90   BP Location: Right arm    Patient Position: Sitting    BP Method: Large (Automatic)    Pulse: 84    Resp: 17    Temp: 97.6 °F (36.4 °C)    TempSrc: Temporal    SpO2: 95%    Weight: 111.1 kg (245 lb)    Height: 5' 9" (1.753 m)         Physical Exam  Vitals and nursing note reviewed.   Constitutional:       Appearance: Normal appearance.   Cardiovascular:      Rate and Rhythm: Normal rate and regular rhythm.      Pulses: Normal pulses.      Heart sounds: Normal heart sounds.   Pulmonary:      Effort: Pulmonary effort is normal.      Breath sounds: Normal breath sounds.   Abdominal:      General: Abdomen is flat. Bowel sounds are normal.      Palpations: Abdomen is soft.   Musculoskeletal:         General: Normal range of motion.   Skin:     General: Skin is warm and dry.   Neurological:      General: No focal deficit present.      Mental Status: She is alert and oriented to person, place, and time. Mental status is at baseline.           Last Labs:     Office Visit on 09/12/2023   Component Date Value    Sodium 09/12/2023 138     Potassium 09/12/2023 4.2     Chloride 09/12/2023 106     CO2 09/12/2023 28     Anion Gap 09/12/2023 8     Glucose 09/12/2023 105     BUN 09/12/2023 9     Creatinine 09/12/2023 0.91     BUN/Creatinine Ratio 09/12/2023 10     Calcium 09/12/2023 9.0     eGFR 09/12/2023 77     Hemoglobin A1C 09/12/2023 5.3     Estimated Average Glucose 09/12/2023 90     Vitamin B12 09/12/2023 271     TSH 09/12/2023 1.230     WBC 09/12/2023 5.37     RBC 09/12/2023 5.04     Hemoglobin 09/12/2023 14.6     Hematocrit 09/12/2023 45.1     MCV 09/12/2023 89.5     MCH 09/12/2023 29.0     MCHC 09/12/2023 32.4  "    RDW 09/12/2023 13.3     Platelet Count 09/12/2023 316     MPV 09/12/2023 9.5     Neutrophils % 09/12/2023 59.0     Lymphocytes % 09/12/2023 28.5     Monocytes % 09/12/2023 8.2 (H)     Eosinophils % 09/12/2023 2.8     Basophils % 09/12/2023 1.1 (H)     Immature Granulocytes % 09/12/2023 0.4     nRBC, Auto 09/12/2023 0.0     Neutrophils, Abs 09/12/2023 3.17     Lymphocytes, Absolute 09/12/2023 1.53     Monocytes, Absolute 09/12/2023 0.44     Eosinophils, Absolute 09/12/2023 0.15     Basophils, Absolute 09/12/2023 0.06     Immature Granulocytes, A* 09/12/2023 0.02     nRBC, Absolute 09/12/2023 0.00     Diff Type 09/12/2023 Auto        Last Imaging:  X-Ray Hip 2 or 3 views Right (with Pelvis when performed)  Narrative: EXAMINATION:  XR HIP WITH PELVIS WHEN PERFORMED, 2 OR 3  VIEWS RIGHT    CLINICAL HISTORY:  Spondylosis without myelopathy or radiculopathy, cervical region    COMPARISON:  None    TECHNIQUE:  Single frontal view of the pelvis as well as frontal and frogleg lateral views of the right hip.    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 Mild degenerative change of the hips.  No acute fracture or dislocation.  Bilateral pedicle screws at L5-S1 as well as pedicle screw on the left at 4 with bilateral posterior rods.  Disc spacer device suggested at the L5-S1 level.  Laminectomy change suggested at the L5 level.  Impression: As above.    Point of Service: [facility]    Electronically signed by: Tyrone Asher  Date:    09/26/2023  Time:    10:58  X-Ray Cervical Spine 2 or 3 Views  Narrative: EXAMINATION:  XR CERVICAL SPINE 2 OR 3 VIEWS    CLINICAL HISTORY:  Spondylosis without myelopathy or radiculopathy, cervical region    COMPARISON:  None    TECHNIQUE:  Frontal, lateral, and open-mouth odontoid views of the cervical spine.    FINDINGS:  Anterior fusion hardware C4 through C7 with osseous fusion.  Straightening of  normal cervical lordosis.  Impression: As above.    Point of Service: [facility]    Electronically signed by: Tyrone Asher  Date:    09/26/2023  Time:    10:55         **Labs and x-rays personally reviewed by me    ** reviewed      Objective:        Assessment:       1. Pap smear for cervical cancer screening  Ambulatory referral/consult to Obstetrics / Gynecology      2. Edema, unspecified type Active furosemide (LASIX) 20 MG tablet      3. Nausea  promethazine (PHENERGAN) 25 MG tablet    Refill her Phenergan.      4. Normal foot exam  Ambulatory referral/consult to Podiatry      5. Encounter for screening for malignant neoplasm of breast, unspecified screening modality  Mammo Digital Screening Bilat      6. Encounter for screening mammogram for malignant neoplasm of breast  Mammo Digital Screening Bilat           Plan:         [unfilled]

## 2023-11-08 DIAGNOSIS — K21.9 GASTROESOPHAGEAL REFLUX DISEASE, UNSPECIFIED WHETHER ESOPHAGITIS PRESENT: ICD-10-CM

## 2023-11-08 RX ORDER — DEXLANSOPRAZOLE 60 MG/1
60 CAPSULE, DELAYED RELEASE ORAL DAILY
Qty: 90 CAPSULE | Refills: 3 | Status: SHIPPED | OUTPATIENT
Start: 2023-11-08 | End: 2024-01-24 | Stop reason: ALTCHOICE

## 2023-11-09 ENCOUNTER — OFFICE VISIT (OUTPATIENT)
Dept: FAMILY MEDICINE | Facility: CLINIC | Age: 53
End: 2023-11-09
Payer: MEDICARE

## 2023-11-09 VITALS
TEMPERATURE: 97 F | HEIGHT: 69 IN | WEIGHT: 219 LBS | SYSTOLIC BLOOD PRESSURE: 141 MMHG | OXYGEN SATURATION: 95 % | RESPIRATION RATE: 18 BRPM | DIASTOLIC BLOOD PRESSURE: 95 MMHG | HEART RATE: 104 BPM | BODY MASS INDEX: 32.44 KG/M2

## 2023-11-09 DIAGNOSIS — R09.81 CONGESTION OF NASAL SINUS: ICD-10-CM

## 2023-11-09 DIAGNOSIS — M62.838 MUSCLE SPASM: ICD-10-CM

## 2023-11-09 DIAGNOSIS — T78.40XD ALLERGY, SUBSEQUENT ENCOUNTER: ICD-10-CM

## 2023-11-09 DIAGNOSIS — R60.9 EDEMA, UNSPECIFIED TYPE: ICD-10-CM

## 2023-11-09 DIAGNOSIS — K21.9 GASTROESOPHAGEAL REFLUX DISEASE, UNSPECIFIED WHETHER ESOPHAGITIS PRESENT: ICD-10-CM

## 2023-11-09 DIAGNOSIS — E87.6 HYPOKALEMIA: ICD-10-CM

## 2023-11-09 DIAGNOSIS — J02.9 SORE THROAT: ICD-10-CM

## 2023-11-09 DIAGNOSIS — J06.9 UPPER RESPIRATORY TRACT INFECTION, UNSPECIFIED TYPE: Primary | ICD-10-CM

## 2023-11-09 PROCEDURE — 99214 PR OFFICE/OUTPT VISIT, EST, LEVL IV, 30-39 MIN: ICD-10-PCS | Mod: ,,, | Performed by: INTERNAL MEDICINE

## 2023-11-09 PROCEDURE — 96372 PR INJECTION,THERAP/PROPH/DIAG2ST, IM OR SUBCUT: ICD-10-PCS | Mod: ,,, | Performed by: INTERNAL MEDICINE

## 2023-11-09 PROCEDURE — 99214 OFFICE O/P EST MOD 30 MIN: CPT | Mod: ,,, | Performed by: INTERNAL MEDICINE

## 2023-11-09 PROCEDURE — 87880 STREP A ASSAY W/OPTIC: CPT | Mod: RHCUB | Performed by: INTERNAL MEDICINE

## 2023-11-09 PROCEDURE — 96372 THER/PROPH/DIAG INJ SC/IM: CPT | Mod: ,,, | Performed by: INTERNAL MEDICINE

## 2023-11-09 PROCEDURE — 87428 SARSCOV & INF VIR A&B AG IA: CPT | Mod: RHCUB | Performed by: INTERNAL MEDICINE

## 2023-11-09 RX ORDER — LORATADINE 10 MG/1
10 TABLET ORAL DAILY
Qty: 30 TABLET | Refills: 0 | Status: SHIPPED | OUTPATIENT
Start: 2023-11-09 | End: 2024-01-31

## 2023-11-09 RX ORDER — MELOXICAM 15 MG/1
15 TABLET ORAL DAILY
Qty: 90 TABLET | Refills: 1 | Status: CANCELLED | OUTPATIENT
Start: 2023-11-09

## 2023-11-09 RX ORDER — MIRTAZAPINE 15 MG/1
15 TABLET, FILM COATED ORAL NIGHTLY
Qty: 90 TABLET | Refills: 1 | Status: CANCELLED | OUTPATIENT
Start: 2023-11-09

## 2023-11-09 RX ORDER — PREGABALIN 150 MG/1
300 CAPSULE ORAL DAILY
Qty: 30 CAPSULE | Refills: 1 | Status: CANCELLED | OUTPATIENT
Start: 2023-11-09

## 2023-11-09 RX ORDER — DEXLANSOPRAZOLE 60 MG/1
60 CAPSULE, DELAYED RELEASE ORAL DAILY
Qty: 90 CAPSULE | Refills: 3 | Status: CANCELLED | OUTPATIENT
Start: 2023-11-09 | End: 2024-11-08

## 2023-11-09 RX ORDER — CETIRIZINE HYDROCHLORIDE 10 MG/1
10 TABLET ORAL DAILY
Qty: 90 TABLET | Refills: 1 | Status: CANCELLED | OUTPATIENT
Start: 2023-11-09

## 2023-11-09 RX ORDER — GUAIFENESIN 600 MG/1
600 TABLET, EXTENDED RELEASE ORAL 2 TIMES DAILY
Qty: 20 TABLET | Refills: 0 | Status: SHIPPED | OUTPATIENT
Start: 2023-11-09

## 2023-11-09 RX ORDER — AZITHROMYCIN 250 MG/1
TABLET, FILM COATED ORAL
Qty: 6 TABLET | Refills: 0 | Status: SHIPPED | OUTPATIENT
Start: 2023-11-09 | End: 2024-01-31

## 2023-11-09 RX ORDER — DEXAMETHASONE SODIUM PHOSPHATE 4 MG/ML
4 INJECTION, SOLUTION INTRA-ARTICULAR; INTRALESIONAL; INTRAMUSCULAR; INTRAVENOUS; SOFT TISSUE
Status: COMPLETED | OUTPATIENT
Start: 2023-11-09 | End: 2023-11-09

## 2023-11-09 RX ORDER — BACLOFEN 10 MG/1
10 TABLET ORAL 3 TIMES DAILY
Qty: 180 TABLET | Refills: 1 | Status: CANCELLED | OUTPATIENT
Start: 2023-11-09

## 2023-11-09 RX ORDER — POTASSIUM CHLORIDE 600 MG/1
8 TABLET, FILM COATED, EXTENDED RELEASE ORAL DAILY
Qty: 90 TABLET | Refills: 1 | Status: CANCELLED | OUTPATIENT
Start: 2023-11-09

## 2023-11-09 RX ORDER — CITALOPRAM 10 MG/1
10 TABLET ORAL DAILY
Qty: 30 TABLET | Refills: 1 | Status: CANCELLED | OUTPATIENT
Start: 2023-11-09

## 2023-11-09 RX ORDER — FUROSEMIDE 20 MG/1
20 TABLET ORAL DAILY
Qty: 90 TABLET | Refills: 1 | Status: CANCELLED | OUTPATIENT
Start: 2023-11-09

## 2023-11-09 RX ORDER — FLUTICASONE PROPIONATE 50 MCG
2 SPRAY, SUSPENSION (ML) NASAL DAILY PRN
Qty: 16 G | Refills: 3 | Status: CANCELLED | OUTPATIENT
Start: 2023-11-09

## 2023-11-09 RX ORDER — ZIPRASIDONE HYDROCHLORIDE 60 MG/1
60 CAPSULE ORAL DAILY
Qty: 90 CAPSULE | Refills: 1 | Status: CANCELLED | OUTPATIENT
Start: 2023-11-09

## 2023-11-09 RX ADMIN — DEXAMETHASONE SODIUM PHOSPHATE 4 MG: 4 INJECTION, SOLUTION INTRA-ARTICULAR; INTRALESIONAL; INTRAMUSCULAR; INTRAVENOUS; SOFT TISSUE at 10:11

## 2023-11-10 DIAGNOSIS — R09.81 CONGESTION OF NASAL SINUS: ICD-10-CM

## 2023-11-13 RX ORDER — FLUTICASONE PROPIONATE 50 MCG
2 SPRAY, SUSPENSION (ML) NASAL DAILY PRN
Qty: 16 G | Refills: 3 | Status: SHIPPED | OUTPATIENT
Start: 2023-11-13 | End: 2024-03-05 | Stop reason: SDUPTHER

## 2023-11-15 PROBLEM — J06.9 UPPER RESPIRATORY TRACT INFECTION: Status: ACTIVE | Noted: 2023-11-15

## 2023-11-15 NOTE — PROGRESS NOTES
Subjective:       Patient ID: Shayy Hunter is a 53 y.o. female.    Chief Complaint: Osteoarthritis  Patient presents with multiple medical problems today.  Patient has a worsening sore throat, patient has postnasal drip nasal congestion.    The diagnosis a upper respiratory tract infection.  Patient also complains of neck pain back pain.    Patient requests several refills.  The plan today is to give Decadron 1 mg IM start a Z-Boo will check for COVID flu and strep  Start guaifenesin 600 mg 1 p.o. b.i.d. and Claritin 10 mg 1 p.o. q.day the Flonase 1 spray q.day. also refer to ENT.    Patient does have chronic muscle spasms the plan continue baclofen 10 mg 3 times a day p.r.n..  Patient has chronic major depressive  The plan Celexa 10 mg 1 p.o. q.day. will continue  Osteoarthritis  Associated symptoms include a sore throat. Pertinent negatives include no abdominal pain, chest pain, fatigue or fever.     .    Current Medications:    Current Outpatient Medications:     baclofen (LIORESAL) 10 MG tablet, Take 1 tablet (10 mg total) by mouth 3 (three) times daily., Disp: 180 tablet, Rfl: 1    cetirizine (ZYRTEC) 10 MG tablet, Take 1 tablet (10 mg total) by mouth once daily., Disp: 90 tablet, Rfl: 1    citalopram (CELEXA) 10 MG tablet, Take 1 tablet (10 mg total) by mouth once daily., Disp: 30 tablet, Rfl: 1    dexlansoprazole (DEXILANT) 60 mg capsule, Take 1 capsule (60 mg total) by mouth once daily., Disp: 90 capsule, Rfl: 3    furosemide (LASIX) 20 MG tablet, Take 1 tablet (20 mg total) by mouth once daily., Disp: 90 tablet, Rfl: 1    gabapentin (NEURONTIN) 300 MG capsule, 1 capsule before bedtime Orally Twice a day, Disp: , Rfl:     hydrOXYzine (ATARAX) 50 MG tablet, 1 tablet as needed Orally every 6 hrs, Disp: , Rfl:     lactulose (CHRONULAC) 10 gram/15 mL solution, Take 15 mLs (10 g total) by mouth 2 (two) times daily as needed (constipation)., Disp: 237 mL, Rfl: 0    lamoTRIgine (LAMICTAL) 200 MG tablet, , Disp: ,  Rfl:     loratadine (CLARITIN) 10 mg tablet, Take 1 tablet (10 mg total) by mouth once daily., Disp: 30 tablet, Rfl: 0    loratadine-pseudoephedrine  mg (CLARITIN-D 24 HOUR)  mg per 24 hr tablet, Take 1 tablet by mouth once daily., Disp: 20 tablet, Rfl: 0    meloxicam (MOBIC) 15 MG tablet, Take 1 tablet (15 mg total) by mouth once daily., Disp: 90 tablet, Rfl: 1    mirtazapine (REMERON) 15 MG tablet, Take 1 tablet (15 mg total) by mouth every evening., Disp: 90 tablet, Rfl: 1    oxycodone-acetaminophen  mg (PERCOCET)  mg per tablet, Take 1 tablet by mouth daily as needed., Disp: , Rfl:     potassium chloride (KLOR-CON) 8 MEQ TbSR, Take 1 tablet (8 mEq total) by mouth once daily., Disp: 90 tablet, Rfl: 1    prazosin (MINIPRESS) 1 MG Cap, , Disp: , Rfl:     pregabalin (LYRICA) 150 MG capsule, Take 2 capsules (300 mg total) by mouth once daily., Disp: 30 capsule, Rfl: 1    promethazine (PHENERGAN) 25 MG tablet, Take 1 tablet (25 mg total) by mouth every 8 (eight) hours as needed for Nausea., Disp: 30 tablet, Rfl: 1    topiramate (TOPAMAX) 100 MG tablet, Take 1 tablet by mouth 2 (two) times a day., Disp: , Rfl:     ziprasidone (GEODON) 60 MG Cap, Take 1 capsule (60 mg total) by mouth once daily., Disp: 90 capsule, Rfl: 1    azithromycin (Z-GIANA) 250 MG tablet, Take 2 tablets by mouth on day 1; Take 1 tablet by mouth on days 2-5, Disp: 6 tablet, Rfl: 0    fluticasone propionate (FLONASE) 50 mcg/actuation nasal spray, 2 sprays (100 mcg total) by Each Nostril route daily as needed for Rhinitis., Disp: 16 g, Rfl: 3    guaiFENesin (MUCINEX) 600 mg 12 hr tablet, Take 1 tablet (600 mg total) by mouth 2 (two) times daily., Disp: 20 tablet, Rfl: 0           Review of Systems   Constitutional:  Negative for appetite change, fatigue and fever.   HENT:  Positive for sore throat.    Respiratory:  Negative for shortness of breath.    Cardiovascular:  Negative for chest pain.   Gastrointestinal:  Negative for  "abdominal pain and constipation.   Endocrine: Negative for polydipsia, polyphagia and polyuria.   Genitourinary:  Negative for difficulty urinating, frequency and hot flashes.   Allergic/Immunologic: Negative for environmental allergies.   Neurological:  Negative for dizziness and light-headedness.   Psychiatric/Behavioral:  Negative for agitation.                 Vitals:    11/09/23 1008   BP: (!) 141/95   BP Location: Right arm   Patient Position: Sitting   BP Method: Large (Automatic)   Pulse: 104   Resp: 18   Temp: 97.3 °F (36.3 °C)   TempSrc: Temporal   SpO2: 95%   Weight: 99.3 kg (219 lb)   Height: 5' 9" (1.753 m)        Physical Exam  Vitals and nursing note reviewed.   Constitutional:       Appearance: Normal appearance.   HENT:      Nose: Congestion present.   Cardiovascular:      Rate and Rhythm: Normal rate and regular rhythm.      Pulses: Normal pulses.      Heart sounds: Normal heart sounds.   Pulmonary:      Effort: Pulmonary effort is normal.      Breath sounds: Normal breath sounds.   Abdominal:      General: Abdomen is flat. Bowel sounds are normal.      Palpations: Abdomen is soft.   Musculoskeletal:         General: Normal range of motion.   Skin:     General: Skin is warm and dry.   Neurological:      General: No focal deficit present.      Mental Status: She is alert and oriented to person, place, and time. Mental status is at baseline.           Last Labs:     Office Visit on 11/09/2023   Component Date Value    Rapid Strep A Screen 11/09/2023 Negative      Acceptab* 11/09/2023 Yes     SARS Coronavirus 2 Antig* 11/09/2023 Negative     Rapid Influenza A Ag 11/09/2023 Negative     Rapid Influenza B Ag 11/09/2023 Negative      Acceptab* 11/09/2023 Yes        Last Imaging:  X-Ray Hip 2 or 3 views Right (with Pelvis when performed)  Narrative: EXAMINATION:  XR HIP WITH PELVIS WHEN PERFORMED, 2 OR 3  VIEWS RIGHT    CLINICAL HISTORY:  Spondylosis without myelopathy or " radiculopathy, cervical region    COMPARISON:  None    TECHNIQUE:  Single frontal view of the pelvis as well as frontal and frogleg lateral views of the right hip.    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 Mild degenerative change of the hips.  No acute fracture or dislocation.  Bilateral pedicle screws at L5-S1 as well as pedicle screw on the left at 4 with bilateral posterior rods.  Disc spacer device suggested at the L5-S1 level.  Laminectomy change suggested at the L5 level.  Impression: As above.    Point of Service: [facility]    Electronically signed by: Tyrone Asher  Date:    09/26/2023  Time:    10:58  X-Ray Cervical Spine 2 or 3 Views  Narrative: EXAMINATION:  XR CERVICAL SPINE 2 OR 3 VIEWS    CLINICAL HISTORY:  Spondylosis without myelopathy or radiculopathy, cervical region    COMPARISON:  None    TECHNIQUE:  Frontal, lateral, and open-mouth odontoid views of the cervical spine.    FINDINGS:  Anterior fusion hardware C4 through C7 with osseous fusion.  Straightening of normal cervical lordosis.  Impression: As above.    Point of Service: [facility]    Electronically signed by: Tyrone Asher  Date:    09/26/2023  Time:    10:55         **Labs and x-rays personally reviewed by me    ** reviewed      Objective:        Assessment:       1. Upper respiratory tract infection, unspecified type  dexAMETHasone injection 4 mg    POCT rapid strep A    POCT SARS-COV2 (COVID) with Flu Antigen      2. Muscle spasm        3. Allergy, subsequent encounter        4. Gastroesophageal reflux disease, unspecified whether esophagitis present        5. Edema, unspecified type Active       6. Hypokalemia        7. Congestion of nasal sinus        8. Sore throat  Ambulatory referral/consult to ENT           Plan:         1. Upper respiratory tract infection, unspecified type  -     dexAMETHasone injection 4 mg  -      POCT rapid strep A  -     POCT SARS-COV2 (COVID) with Flu Antigen    2. Muscle spasm    3. Allergy, subsequent encounter    4. Gastroesophageal reflux disease, unspecified whether esophagitis present    5. Edema, unspecified type    6. Hypokalemia    7. Congestion of nasal sinus    8. Sore throat  -     Ambulatory referral/consult to ENT; Future; Expected date: 11/16/2023    Other orders  -     azithromycin (Z-GIANA) 250 MG tablet; Take 2 tablets by mouth on day 1; Take 1 tablet by mouth on days 2-5  Dispense: 6 tablet; Refill: 0  -     guaiFENesin (MUCINEX) 600 mg 12 hr tablet; Take 1 tablet (600 mg total) by mouth 2 (two) times daily.  Dispense: 20 tablet; Refill: 0  -     loratadine (CLARITIN) 10 mg tablet; Take 1 tablet (10 mg total) by mouth once daily.  Dispense: 30 tablet; Refill: 0

## 2023-12-09 DIAGNOSIS — Z71.89 COMPLEX CARE COORDINATION: ICD-10-CM

## 2024-01-08 ENCOUNTER — OFFICE VISIT (OUTPATIENT)
Dept: FAMILY MEDICINE | Facility: CLINIC | Age: 54
End: 2024-01-08
Payer: MEDICARE

## 2024-01-08 VITALS
HEART RATE: 97 BPM | WEIGHT: 225 LBS | RESPIRATION RATE: 18 BRPM | SYSTOLIC BLOOD PRESSURE: 149 MMHG | OXYGEN SATURATION: 100 % | TEMPERATURE: 98 F | DIASTOLIC BLOOD PRESSURE: 99 MMHG | HEIGHT: 69 IN | BODY MASS INDEX: 33.33 KG/M2

## 2024-01-08 DIAGNOSIS — R60.9 EDEMA, UNSPECIFIED TYPE: ICD-10-CM

## 2024-01-08 DIAGNOSIS — R11.0 NAUSEA: Chronic | ICD-10-CM

## 2024-01-08 DIAGNOSIS — M19.90 OSTEOARTHRITIS, UNSPECIFIED OSTEOARTHRITIS TYPE, UNSPECIFIED SITE: Primary | ICD-10-CM

## 2024-01-08 DIAGNOSIS — M62.838 MUSCLE SPASM: ICD-10-CM

## 2024-01-08 DIAGNOSIS — E87.6 HYPOKALEMIA: ICD-10-CM

## 2024-01-08 DIAGNOSIS — T78.40XD ALLERGY, SUBSEQUENT ENCOUNTER: ICD-10-CM

## 2024-01-08 PROCEDURE — 99214 OFFICE O/P EST MOD 30 MIN: CPT | Mod: ,,, | Performed by: INTERNAL MEDICINE

## 2024-01-08 PROCEDURE — 96372 THER/PROPH/DIAG INJ SC/IM: CPT | Mod: ,,, | Performed by: INTERNAL MEDICINE

## 2024-01-08 RX ORDER — BACLOFEN 10 MG/1
10 TABLET ORAL 3 TIMES DAILY
Qty: 180 TABLET | Refills: 1 | Status: SHIPPED | OUTPATIENT
Start: 2024-01-08

## 2024-01-08 RX ORDER — MELOXICAM 15 MG/1
15 TABLET ORAL DAILY
Qty: 90 TABLET | Refills: 1 | Status: SHIPPED | OUTPATIENT
Start: 2024-01-08

## 2024-01-08 RX ORDER — KETOROLAC TROMETHAMINE 30 MG/ML
15 INJECTION, SOLUTION INTRAMUSCULAR; INTRAVENOUS
Status: COMPLETED | OUTPATIENT
Start: 2024-01-08 | End: 2024-01-08

## 2024-01-08 RX ORDER — CETIRIZINE HYDROCHLORIDE 10 MG/1
10 TABLET ORAL DAILY
Qty: 90 TABLET | Refills: 1 | Status: SHIPPED | OUTPATIENT
Start: 2024-01-08

## 2024-01-08 RX ORDER — POTASSIUM CHLORIDE 600 MG/1
8 TABLET, EXTENDED RELEASE ORAL DAILY
Qty: 90 TABLET | Refills: 1 | Status: SHIPPED | OUTPATIENT
Start: 2024-01-08

## 2024-01-08 RX ORDER — FUROSEMIDE 20 MG/1
20 TABLET ORAL DAILY
Qty: 90 TABLET | Refills: 1 | Status: SHIPPED | OUTPATIENT
Start: 2024-01-08

## 2024-01-08 RX ORDER — PROMETHAZINE HYDROCHLORIDE 25 MG/1
25 TABLET ORAL EVERY 8 HOURS PRN
Qty: 30 TABLET | Refills: 1 | Status: SHIPPED | OUTPATIENT
Start: 2024-01-08

## 2024-01-08 RX ADMIN — KETOROLAC TROMETHAMINE 15 MG: 30 INJECTION, SOLUTION INTRAMUSCULAR; INTRAVENOUS at 10:01

## 2024-01-11 PROBLEM — M19.90 OSTEOARTHRITIS: Status: ACTIVE | Noted: 2024-01-11

## 2024-01-11 NOTE — PROGRESS NOTES
Subjective:       Patient ID: Shayy Hunter is a 53 y.o. female.    Chief Complaint: Knee Pain, Back Pain, Neck Pain, and Flu Vaccine    Knee Pain     Back Pain    Neck Pain     Patient has multiple complaints complains of severe back pain neck pain knee pain.  Patient does have diffuse osteoarthritis and degenerative joint disease  Patient has chronic obesity chronic hypertension pain today is a 9/10.  Plan today Toradol 15 mg IM refill baclofen 10 mg 1 p.o. 3 times a day.  Patient has chronic allergic rhinitis will refill Zyrtec and for intermittent edema refill Lasix 20 mg 1 p.o. q.day.    For the chronic obesity recommend low carb diet exercise 30 minutes a day and lifestyle modification.    Patient is also requesting a flu shot will give a flu vaccination today  .    Current Medications:    Current Outpatient Medications:     azithromycin (Z-GIANA) 250 MG tablet, Take 2 tablets by mouth on day 1; Take 1 tablet by mouth on days 2-5, Disp: 6 tablet, Rfl: 0    citalopram (CELEXA) 10 MG tablet, Take 1 tablet (10 mg total) by mouth once daily., Disp: 30 tablet, Rfl: 1    dexlansoprazole (DEXILANT) 60 mg capsule, Take 1 capsule (60 mg total) by mouth once daily., Disp: 90 capsule, Rfl: 3    fluticasone propionate (FLONASE) 50 mcg/actuation nasal spray, 2 sprays (100 mcg total) by Each Nostril route daily as needed for Rhinitis., Disp: 16 g, Rfl: 3    gabapentin (NEURONTIN) 300 MG capsule, 1 capsule before bedtime Orally Twice a day, Disp: , Rfl:     guaiFENesin (MUCINEX) 600 mg 12 hr tablet, Take 1 tablet (600 mg total) by mouth 2 (two) times daily., Disp: 20 tablet, Rfl: 0    hydrOXYzine (ATARAX) 50 MG tablet, 1 tablet as needed Orally every 6 hrs, Disp: , Rfl:     lactulose (CHRONULAC) 10 gram/15 mL solution, Take 15 mLs (10 g total) by mouth 2 (two) times daily as needed (constipation)., Disp: 237 mL, Rfl: 0    lamoTRIgine (LAMICTAL) 200 MG tablet, , Disp: , Rfl:     loratadine (CLARITIN) 10 mg tablet, Take 1 tablet  "(10 mg total) by mouth once daily., Disp: 30 tablet, Rfl: 0    loratadine-pseudoephedrine  mg (CLARITIN-D 24 HOUR)  mg per 24 hr tablet, Take 1 tablet by mouth once daily., Disp: 20 tablet, Rfl: 0    mirtazapine (REMERON) 15 MG tablet, Take 1 tablet (15 mg total) by mouth every evening., Disp: 90 tablet, Rfl: 1    oxycodone-acetaminophen  mg (PERCOCET)  mg per tablet, Take 1 tablet by mouth daily as needed., Disp: , Rfl:     prazosin (MINIPRESS) 1 MG Cap, , Disp: , Rfl:     pregabalin (LYRICA) 150 MG capsule, Take 2 capsules (300 mg total) by mouth once daily., Disp: 30 capsule, Rfl: 1    topiramate (TOPAMAX) 100 MG tablet, Take 1 tablet by mouth 2 (two) times a day., Disp: , Rfl:     ziprasidone (GEODON) 60 MG Cap, Take 1 capsule (60 mg total) by mouth once daily., Disp: 90 capsule, Rfl: 1    baclofen (LIORESAL) 10 MG tablet, Take 1 tablet (10 mg total) by mouth 3 (three) times daily., Disp: 180 tablet, Rfl: 1    cetirizine (ZYRTEC) 10 MG tablet, Take 1 tablet (10 mg total) by mouth once daily., Disp: 90 tablet, Rfl: 1    furosemide (LASIX) 20 MG tablet, Take 1 tablet (20 mg total) by mouth once daily., Disp: 90 tablet, Rfl: 1    meloxicam (MOBIC) 15 MG tablet, Take 1 tablet (15 mg total) by mouth once daily., Disp: 90 tablet, Rfl: 1    potassium chloride (KLOR-CON) 8 MEQ TbSR, Take 1 tablet (8 mEq total) by mouth once daily., Disp: 90 tablet, Rfl: 1    promethazine (PHENERGAN) 25 MG tablet, Take 1 tablet (25 mg total) by mouth every 8 (eight) hours as needed for Nausea., Disp: 30 tablet, Rfl: 1           Review of Systems   Musculoskeletal:  Positive for back pain and neck pain.                Vitals:    01/08/24 0927 01/08/24 0934   BP: (!) 168/105 (!) 149/99   BP Location: Left arm Right arm   Patient Position: Sitting    BP Method: Large (Automatic)    Pulse: 97    Resp: 18    Temp: 97.9 °F (36.6 °C)    TempSrc: Temporal    SpO2: 100%    Weight: 102.1 kg (225 lb)    Height: 5' 9" (1.753 " m)         Physical Exam  Vitals and nursing note reviewed.   Constitutional:       Appearance: Normal appearance. She is obese.   Cardiovascular:      Rate and Rhythm: Normal rate and regular rhythm.      Pulses: Normal pulses.      Heart sounds: Normal heart sounds.   Pulmonary:      Effort: Pulmonary effort is normal.      Breath sounds: Normal breath sounds.   Abdominal:      General: Abdomen is flat. Bowel sounds are normal.      Palpations: Abdomen is soft.   Musculoskeletal:         General: Normal range of motion.   Skin:     General: Skin is warm and dry.   Neurological:      General: No focal deficit present.      Mental Status: She is alert and oriented to person, place, and time. Mental status is at baseline.           Last Labs:     No visits with results within 1 Month(s) from this visit.   Latest known visit with results is:   Office Visit on 11/09/2023   Component Date Value    Rapid Strep A Screen 11/09/2023 Negative      Acceptab* 11/09/2023 Yes     SARS Coronavirus 2 Antig* 11/09/2023 Negative     Rapid Influenza A Ag 11/09/2023 Negative     Rapid Influenza B Ag 11/09/2023 Negative      Acceptab* 11/09/2023 Yes        Last Imaging:  X-Ray Hip 2 or 3 views Right (with Pelvis when performed)  Narrative: EXAMINATION:  XR HIP WITH PELVIS WHEN PERFORMED, 2 OR 3  VIEWS RIGHT    CLINICAL HISTORY:  Spondylosis without myelopathy or radiculopathy, cervical region    COMPARISON:  None    TECHNIQUE:  Single frontal view of the pelvis as well as frontal and frogleg lateral views of the right hip.    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 Mild degenerative change of the hips.  No acute fracture or dislocation.  Bilateral pedicle screws at L5-S1 as well as pedicle screw on the left at 4 with bilateral posterior rods.  Disc spacer device suggested at the L5-S1 level.  Laminectomy change suggested at  the L5 level.  Impression: As above.    Point of Service: [facility]    Electronically signed by: Tyrone Asher  Date:    09/26/2023  Time:    10:58  X-Ray Cervical Spine 2 or 3 Views  Narrative: EXAMINATION:  XR CERVICAL SPINE 2 OR 3 VIEWS    CLINICAL HISTORY:  Spondylosis without myelopathy or radiculopathy, cervical region    COMPARISON:  None    TECHNIQUE:  Frontal, lateral, and open-mouth odontoid views of the cervical spine.    FINDINGS:  Anterior fusion hardware C4 through C7 with osseous fusion.  Straightening of normal cervical lordosis.  Impression: As above.    Point of Service: [facility]    Electronically signed by: Tyrone Asher  Date:    09/26/2023  Time:    10:55         **Labs and x-rays personally reviewed by me    ** reviewed      Objective:        Assessment:       1. Osteoarthritis, unspecified osteoarthritis type, unspecified site  ketorolac injection 15 mg      2. Muscle spasm  baclofen (LIORESAL) 10 MG tablet      3. Allergy, subsequent encounter  cetirizine (ZYRTEC) 10 MG tablet      4. Edema, unspecified type Active furosemide (LASIX) 20 MG tablet      5. Hypokalemia  potassium chloride (KLOR-CON) 8 MEQ TbSR      6. Nausea  promethazine (PHENERGAN) 25 MG tablet    Refill her Phenergan.           Plan:         1. Osteoarthritis, unspecified osteoarthritis type, unspecified site  -     ketorolac injection 15 mg    2. Muscle spasm  -     baclofen (LIORESAL) 10 MG tablet; Take 1 tablet (10 mg total) by mouth 3 (three) times daily.  Dispense: 180 tablet; Refill: 1    3. Allergy, subsequent encounter  -     cetirizine (ZYRTEC) 10 MG tablet; Take 1 tablet (10 mg total) by mouth once daily.  Dispense: 90 tablet; Refill: 1    4. Edema, unspecified type  -     furosemide (LASIX) 20 MG tablet; Take 1 tablet (20 mg total) by mouth once daily.  Dispense: 90 tablet; Refill: 1    5. Hypokalemia  -     potassium chloride (KLOR-CON) 8 MEQ TbSR; Take 1 tablet (8 mEq total) by  mouth once daily.  Dispense: 90 tablet; Refill: 1    6. Nausea  Comments:  Refill her Phenergan.  Overview:  Refill her Phenergan.    Orders:  -     promethazine (PHENERGAN) 25 MG tablet; Take 1 tablet (25 mg total) by mouth every 8 (eight) hours as needed for Nausea.  Dispense: 30 tablet; Refill: 1    Other orders  -     meloxicam (MOBIC) 15 MG tablet; Take 1 tablet (15 mg total) by mouth once daily.  Dispense: 90 tablet; Refill: 1

## 2024-01-15 PROBLEM — Z00.00 NORMAL FOOT EXAM: Status: RESOLVED | Noted: 2023-10-10 | Resolved: 2024-01-15

## 2024-01-23 ENCOUNTER — TELEPHONE (OUTPATIENT)
Dept: SPINE | Facility: CLINIC | Age: 54
End: 2024-01-23
Payer: MEDICARE

## 2024-01-24 ENCOUNTER — ANESTHESIA EVENT (OUTPATIENT)
Dept: GASTROENTEROLOGY | Facility: HOSPITAL | Age: 54
End: 2024-01-24
Payer: MEDICARE

## 2024-01-24 ENCOUNTER — ANESTHESIA (OUTPATIENT)
Dept: GASTROENTEROLOGY | Facility: HOSPITAL | Age: 54
End: 2024-01-24
Payer: MEDICARE

## 2024-01-24 ENCOUNTER — HOSPITAL ENCOUNTER (OUTPATIENT)
Dept: GASTROENTEROLOGY | Facility: HOSPITAL | Age: 54
Discharge: HOME | End: 2024-01-24
Payer: MEDICARE

## 2024-01-24 ENCOUNTER — TELEPHONE (OUTPATIENT)
Dept: FAMILY MEDICINE | Facility: CLINIC | Age: 54
End: 2024-01-24
Payer: MEDICARE

## 2024-01-24 VITALS
HEART RATE: 81 BPM | RESPIRATION RATE: 10 BRPM | DIASTOLIC BLOOD PRESSURE: 62 MMHG | SYSTOLIC BLOOD PRESSURE: 113 MMHG | OXYGEN SATURATION: 96 % | TEMPERATURE: 98 F

## 2024-01-24 DIAGNOSIS — Z12.11 COLON CANCER SCREENING: Primary | ICD-10-CM

## 2024-01-24 DIAGNOSIS — K21.9 GASTROESOPHAGEAL REFLUX DISEASE WITHOUT ESOPHAGITIS: Primary | ICD-10-CM

## 2024-01-24 DIAGNOSIS — Z12.11 SCREENING FOR COLON CANCER: ICD-10-CM

## 2024-01-24 PROCEDURE — 63600175 PHARM REV CODE 636 W HCPCS

## 2024-01-24 PROCEDURE — 37000009 HC ANESTHESIA EA ADD 15 MINS

## 2024-01-24 PROCEDURE — 45385 COLONOSCOPY W/LESION REMOVAL: CPT | Mod: PT | Performed by: INTERNAL MEDICINE

## 2024-01-24 PROCEDURE — 37000008 HC ANESTHESIA 1ST 15 MINUTES

## 2024-01-24 PROCEDURE — 45385 COLONOSCOPY W/LESION REMOVAL: CPT | Mod: PT,,, | Performed by: INTERNAL MEDICINE

## 2024-01-24 PROCEDURE — 45380 COLONOSCOPY AND BIOPSY: CPT | Mod: PT,59,, | Performed by: INTERNAL MEDICINE

## 2024-01-24 PROCEDURE — 27201423 OPTIME MED/SURG SUP & DEVICES STERILE SUPPLY

## 2024-01-24 PROCEDURE — 88305 TISSUE EXAM BY PATHOLOGIST: CPT | Mod: TC,SUR | Performed by: INTERNAL MEDICINE

## 2024-01-24 PROCEDURE — 25000003 PHARM REV CODE 250

## 2024-01-24 PROCEDURE — D9220A PRA ANESTHESIA: Mod: PT,,,

## 2024-01-24 PROCEDURE — 88305 TISSUE EXAM BY PATHOLOGIST: CPT | Mod: 26,,, | Performed by: PATHOLOGY

## 2024-01-24 PROCEDURE — 45380 COLONOSCOPY AND BIOPSY: CPT | Mod: PT,59 | Performed by: INTERNAL MEDICINE

## 2024-01-24 RX ORDER — LIDOCAINE HYDROCHLORIDE 20 MG/ML
INJECTION, SOLUTION EPIDURAL; INFILTRATION; INTRACAUDAL; PERINEURAL
Status: DISCONTINUED | OUTPATIENT
Start: 2024-01-24 | End: 2024-01-24

## 2024-01-24 RX ORDER — OMEPRAZOLE 40 MG/1
40 CAPSULE, DELAYED RELEASE ORAL DAILY
Qty: 90 CAPSULE | Refills: 3 | Status: SHIPPED | OUTPATIENT
Start: 2024-01-24 | End: 2024-04-09 | Stop reason: SDUPTHER

## 2024-01-24 RX ORDER — SODIUM CHLORIDE 0.9 % (FLUSH) 0.9 %
10 SYRINGE (ML) INJECTION
Status: CANCELLED | OUTPATIENT
Start: 2024-01-24

## 2024-01-24 RX ORDER — SODIUM CHLORIDE 9 MG/ML
INJECTION, SOLUTION INTRAVENOUS CONTINUOUS PRN
Status: DISCONTINUED | OUTPATIENT
Start: 2024-01-24 | End: 2024-01-24

## 2024-01-24 RX ORDER — PROPOFOL 10 MG/ML
VIAL (ML) INTRAVENOUS
Status: DISCONTINUED | OUTPATIENT
Start: 2024-01-24 | End: 2024-01-24

## 2024-01-24 RX ADMIN — PROPOFOL 40 MG: 10 INJECTION, EMULSION INTRAVENOUS at 12:01

## 2024-01-24 RX ADMIN — LIDOCAINE HYDROCHLORIDE 100 MG: 20 INJECTION, SOLUTION INTRAVENOUS at 12:01

## 2024-01-24 RX ADMIN — PROPOFOL 120 MG: 10 INJECTION, EMULSION INTRAVENOUS at 12:01

## 2024-01-24 RX ADMIN — SODIUM CHLORIDE: 9 INJECTION, SOLUTION INTRAVENOUS at 11:01

## 2024-01-24 NOTE — ANESTHESIA PREPROCEDURE EVALUATION
01/24/2024  Shayy Hunter is a 53 y.o., female.      Pre-op Assessment    I have reviewed the Patient Summary Reports.     I have reviewed the Nursing Notes. I have reviewed the NPO Status.   I have reviewed the Medications.     Review of Systems  Anesthesia Hx:  No problems with previous Anesthesia                Hepatic/GI:     GERD             Musculoskeletal:  Arthritis               Neurological:    Neuromuscular Disease,             Chronic Pain Syndrome                         Endocrine:  Diabetes         Obesity / BMI > 30  Psych:  Psychiatric History  depression                Physical Exam  General: Well nourished, Cooperative, Alert and Oriented    Airway:  Mallampati: II   Mouth Opening: Normal  TM Distance: Normal  Tongue: Normal  Neck ROM: Normal ROM    Dental:  Intact    Chest/Lungs:  Clear to auscultation, Normal Respiratory Rate    Heart:  Rate: Normal  Rhythm: Regular Rhythm  Sounds: Normal    Abdomen:  Normal, Soft, Nontender        Anesthesia Plan  Type of Anesthesia, risks & benefits discussed:    Anesthesia Type: Gen Natural Airway, MAC  Intra-op Monitoring Plan: Standard ASA Monitors  Post Op Pain Control Plan: multimodal analgesia and IV/PO Opioids PRN  Induction:  IV  Informed Consent: Informed consent signed with the Patient and all parties understand the risks and agree with anesthesia plan.  All questions answered.   ASA Score: 3  Day of Surgery Review of History & Physical: I have interviewed and examined the patient. I have reviewed the patient's H&P dated:     Ready For Surgery From Anesthesia Perspective.     .

## 2024-01-24 NOTE — ANESTHESIA POSTPROCEDURE EVALUATION
Anesthesia Post Evaluation    Patient: Shayy Hunter    Procedure(s) Performed: colonoscopy    Final Anesthesia Type: general      Patient location during evaluation: GI PACU  Patient participation: Yes- Able to Participate  Level of consciousness: awake and alert  Post-procedure vital signs: reviewed and stable  Pain management: adequate  Airway patency: patent    PONV status at discharge: No PONV  Anesthetic complications: no      Cardiovascular status: blood pressure returned to baseline  Respiratory status: unassisted and spontaneous ventilation  Hydration status: euvolemic  Follow-up not needed.  Comments: Refer to nursing note for pain and myla score upon discharge from recovery              Vitals Value Taken Time   /58 01/24/24 1236   Temp 97.8f 01/24/24 1239   Pulse 84 01/24/24 1239   Resp 10 01/24/24 1239   SpO2 96 % 01/24/24 1239   Vitals shown include unvalidated device data.      No case tracking events are documented in the log.      Pain/Myla Score: Myla Score: 10 (1/24/2024 12:35 PM)

## 2024-01-24 NOTE — DISCHARGE INSTRUCTIONS
Procedure Date  1/24/24     Impression  Overall Impression:   3 subcentimeter polyps were removed  Scattered diverticulosis of moderate severity in the descending colon and sigmoid colon  The ileocecal valve, cecum and transverse colon appeared normal.  (grade 2) hemorrhoids        Recommendation    Await pathology results     Repeat colonoscopy in 5 years   THE NURSE WILL CALL YOU WITH YOUR BIOPSY RESULTS IN A FEW DAYS.NO DRIVING, OPERATING EQUIPMENT, OR SIGNING LEGAL DOCUMENTS FOR 24 HOURS.

## 2024-01-24 NOTE — TELEPHONE ENCOUNTER
----- Message from Juno Brady MD sent at 1/24/2024  2:07 PM CST -----  Need to see Monday please  abnl results     1508 Pt is scheduled to come in on 01/31/24

## 2024-01-24 NOTE — TRANSFER OF CARE
Anesthesia Transfer of Care Note    Patient: Shayy Hunter    Procedure(s) Performed: colonoscopy    Patient location: GI    Anesthesia Type: general and MAC    Transport from OR: Transported from OR on room air with adequate spontaneous ventilation    Post pain: adequate analgesia    Post assessment: no apparent anesthetic complications    Post vital signs: stable    Level of consciousness: awake, alert and oriented    Nausea/Vomiting: no nausea/vomiting    Complications: none    Transfer of care protocol was followedComments: Refer to nursing note for pain myla score upon discharge from recovery      Last vitals: Visit Vitals  BP (!) 91/55   Pulse 90   Temp 36.4 °C (97.6 °F)   Resp 14   SpO2 95%

## 2024-01-24 NOTE — H&P
Gastroenterology Pre-procedure H&P    Chief Complaint: Colon cancer screening    History of Present Illness    Shayy Hunter is a 53 y.o. female that  has a past medical history of Depression (2012), GERD (gastroesophageal reflux disease), Herniation of intervertebral disc between L4 and L5, LBP (low back pain) (2012), and Sciatica.     Patient here for routine screening     Patient denies wt loss, abdominal pain, diarrhea, melena/hematochezia, change in stool caliber, no anticoagulants, FMHx of GI related malignancies.      Past Medical History:   Diagnosis Date    Depression 2012    GERD (gastroesophageal reflux disease)     Herniation of intervertebral disc between L4 and L5     L4, L5, S1    LBP (low back pain) 2012    Sciatica        Past Surgical History:   Procedure Laterality Date    ANTERIOR CERVICAL DISCECTOMY W/ FUSION  2016    BACK SURGERY      back surgery  2015    CHOLECYSTECTOMY  2012    TONSILLECTOMY         Family History   Problem Relation Age of Onset    Hypertension Mother     Diabetes Mother     Neuropathy Father        Social History     Socioeconomic History    Marital status: Single   Tobacco Use    Smoking status: Former     Current packs/day: 0.00     Average packs/day: 0.1 packs/day for 2.0 years (0.3 ttl pk-yrs)     Types: Cigarettes     Start date: 2009     Quit date: 2011     Years since quittin.9    Smokeless tobacco: Never    Tobacco comments:     quit 1 1/2 years ago   Substance and Sexual Activity    Alcohol use: No    Drug use: Never       Current Outpatient Medications   Medication Sig Dispense Refill    azithromycin (Z-GIANA) 250 MG tablet Take 2 tablets by mouth on day 1; Take 1 tablet by mouth on days 2-5 6 tablet 0    baclofen (LIORESAL) 10 MG tablet Take 1 tablet (10 mg total) by mouth 3 (three) times daily. 180 tablet 1    cetirizine (ZYRTEC) 10 MG tablet Take 1 tablet (10 mg total) by mouth once daily. 90 tablet 1    citalopram  (CELEXA) 10 MG tablet Take 1 tablet (10 mg total) by mouth once daily. 30 tablet 1    dexlansoprazole (DEXILANT) 60 mg capsule Take 1 capsule (60 mg total) by mouth once daily. 90 capsule 3    fluticasone propionate (FLONASE) 50 mcg/actuation nasal spray 2 sprays (100 mcg total) by Each Nostril route daily as needed for Rhinitis. 16 g 3    furosemide (LASIX) 20 MG tablet Take 1 tablet (20 mg total) by mouth once daily. 90 tablet 1    gabapentin (NEURONTIN) 300 MG capsule 1 capsule before bedtime Orally Twice a day      guaiFENesin (MUCINEX) 600 mg 12 hr tablet Take 1 tablet (600 mg total) by mouth 2 (two) times daily. 20 tablet 0    hydrOXYzine (ATARAX) 50 MG tablet 1 tablet as needed Orally every 6 hrs      lactulose (CHRONULAC) 10 gram/15 mL solution Take 15 mLs (10 g total) by mouth 2 (two) times daily as needed (constipation). 237 mL 0    lamoTRIgine (LAMICTAL) 200 MG tablet       loratadine (CLARITIN) 10 mg tablet Take 1 tablet (10 mg total) by mouth once daily. 30 tablet 0    loratadine-pseudoephedrine  mg (CLARITIN-D 24 HOUR)  mg per 24 hr tablet Take 1 tablet by mouth once daily. 20 tablet 0    meloxicam (MOBIC) 15 MG tablet Take 1 tablet (15 mg total) by mouth once daily. 90 tablet 1    mirtazapine (REMERON) 15 MG tablet Take 1 tablet (15 mg total) by mouth every evening. 90 tablet 1    oxycodone-acetaminophen  mg (PERCOCET)  mg per tablet Take 1 tablet by mouth daily as needed.      potassium chloride (KLOR-CON) 8 MEQ TbSR Take 1 tablet (8 mEq total) by mouth once daily. 90 tablet 1    prazosin (MINIPRESS) 1 MG Cap       pregabalin (LYRICA) 150 MG capsule Take 2 capsules (300 mg total) by mouth once daily. 30 capsule 1    promethazine (PHENERGAN) 25 MG tablet Take 1 tablet (25 mg total) by mouth every 8 (eight) hours as needed for Nausea. 30 tablet 1    topiramate (TOPAMAX) 100 MG tablet Take 1 tablet by mouth 2 (two) times a day.      ziprasidone (GEODON) 60 MG Cap Take 1 capsule  (60 mg total) by mouth once daily. 90 capsule 1     No current facility-administered medications for this encounter.       Review of patient's allergies indicates:   Allergen Reactions    Perflutren lipid microspheres Shortness Of Breath, Nausea Only, Rash and Other (See Comments)     Red faced, short of breath and nauseated.     Amoxicillin Rash and Other (See Comments)       Objective:  Vitals:    01/24/24 1148   BP: (!) 143/83   Pulse: 97   Resp: 13   SpO2: (!) 94%        GEN: normal appearing, NAD, AAO x3  HENT: NCAT, anicteric, OP benign  CV: normal rate, regular rhythm  RESP: NABS, symmetric rise, unlabored  ABD: soft, ND, no guarding or TTP  SKIN: warm and dry  NEURO: grossly afocal    Assessment and Plan:    Proceed with:    Colonoscopy for screening for colon cancer    Luiz Hutchinson MD  Gastroenterology

## 2024-01-25 ENCOUNTER — TELEPHONE (OUTPATIENT)
Dept: FAMILY MEDICINE | Facility: CLINIC | Age: 54
End: 2024-01-25
Payer: MEDICARE

## 2024-01-25 NOTE — PROGRESS NOTES
Dr. Brady, thank you for referring this patient to me. I recommend repeat colonoscopy in 5 years. Please let me know if you have any questions regarding this patient.

## 2024-01-25 NOTE — TELEPHONE ENCOUNTER
----- Message from Juno Brady MD sent at 1/25/2024  3:53 PM CST -----  Need to see in  1 week please  abnl results     1607 Pt is scheduled to come in on 01/31/24.

## 2024-01-31 ENCOUNTER — OFFICE VISIT (OUTPATIENT)
Dept: GASTROENTEROLOGY | Facility: CLINIC | Age: 54
End: 2024-01-31
Payer: MEDICARE

## 2024-01-31 ENCOUNTER — OFFICE VISIT (OUTPATIENT)
Dept: FAMILY MEDICINE | Facility: CLINIC | Age: 54
End: 2024-01-31
Payer: MEDICARE

## 2024-01-31 VITALS
OXYGEN SATURATION: 95 % | BODY MASS INDEX: 37.03 KG/M2 | HEIGHT: 69 IN | DIASTOLIC BLOOD PRESSURE: 89 MMHG | TEMPERATURE: 98 F | WEIGHT: 250 LBS | SYSTOLIC BLOOD PRESSURE: 139 MMHG | HEART RATE: 91 BPM | RESPIRATION RATE: 17 BRPM

## 2024-01-31 VITALS
OXYGEN SATURATION: 99 % | SYSTOLIC BLOOD PRESSURE: 185 MMHG | HEIGHT: 69 IN | DIASTOLIC BLOOD PRESSURE: 100 MMHG | BODY MASS INDEX: 37.33 KG/M2 | HEART RATE: 92 BPM | WEIGHT: 252 LBS

## 2024-01-31 DIAGNOSIS — K59.04 CHRONIC IDIOPATHIC CONSTIPATION: ICD-10-CM

## 2024-01-31 DIAGNOSIS — R10.13 EPIGASTRIC PAIN: Primary | ICD-10-CM

## 2024-01-31 DIAGNOSIS — E66.9 CLASS 2 OBESITY WITH BODY MASS INDEX (BMI) OF 37.0 TO 37.9 IN ADULT, UNSPECIFIED OBESITY TYPE, UNSPECIFIED WHETHER SERIOUS COMORBIDITY PRESENT: Primary | ICD-10-CM

## 2024-01-31 DIAGNOSIS — K21.9 GASTROESOPHAGEAL REFLUX DISEASE WITHOUT ESOPHAGITIS: ICD-10-CM

## 2024-01-31 DIAGNOSIS — R21 RASH: ICD-10-CM

## 2024-01-31 PROCEDURE — 99215 OFFICE O/P EST HI 40 MIN: CPT | Mod: PBBFAC

## 2024-01-31 PROCEDURE — 99213 OFFICE O/P EST LOW 20 MIN: CPT | Mod: ,,, | Performed by: INTERNAL MEDICINE

## 2024-01-31 PROCEDURE — G0008 ADMIN INFLUENZA VIRUS VAC: HCPCS | Mod: ,,, | Performed by: INTERNAL MEDICINE

## 2024-01-31 PROCEDURE — 99213 OFFICE O/P EST LOW 20 MIN: CPT | Mod: S$PBB,,,

## 2024-01-31 PROCEDURE — 90686 IIV4 VACC NO PRSV 0.5 ML IM: CPT | Mod: ,,, | Performed by: INTERNAL MEDICINE

## 2024-01-31 RX ORDER — MUPIROCIN 20 MG/G
OINTMENT TOPICAL 2 TIMES DAILY
Qty: 1 G | Refills: 3 | Status: SHIPPED | OUTPATIENT
Start: 2024-01-31

## 2024-01-31 RX ORDER — PROPRANOLOL HYDROCHLORIDE 20 MG/1
TABLET ORAL
COMMUNITY
Start: 2024-01-08

## 2024-01-31 NOTE — PATIENT INSTRUCTIONS
- Gastric emptying study to rule out gastroparesis   - I recommend a bowel cleanse with a gatorade miralax prep: take 20 mg of dulcolax orally and then mix 238 grams of miralax in 64 oz of your favorite zero sugar gatorade and drink over a 4 hour period on a day when you will be at home  - After your bowel cleanse, I recommend starting a daily fiber supplement with Citrucel or Fibercon (can purchase at your local pharmacy)  - I recommend that you also use Miralax 17 grams daily-to-twice daily as needed to have a regular, soft BM without straining - you can adjust how often you need miralax, but start with daily dosing and go from there  - I recommend drinking at least 60-80 ounces of water daily unless you have a medical condition that requires fluid restriction

## 2024-02-09 NOTE — PROGRESS NOTES
Gastroenterology Clinic Note    Patient ID: [id number]   Referring MD: No ref. provider found   Chief Complaint:   Chief Complaint   Patient presents with    Follow-up       History of Present Illness   Shayy Hunter is an 53 y.o. WF who is referred for GERD symptoms. Patient reports daily symptoms of epigastric pain/burning. Symptoms will wake her from sleep at night. Also experiencing recurrent nausea and vomiting. She has tried multiple therapies in the past including omeprazole and pantoprazole along with OTC therapies. None of these provided her with relief of symptoms. She reports taking Dexilant for many years now; this has controlled her symptoms well until she ran out and medication was no longer covered by her insurance. She is requesting refill today. She complains of dysphagia. Required dilation in 2017 with Dr. Crespo that improved symptoms until they returned over the past several months. She reports alternating constipation and diarrhea throughout her life. Was seen by GI in 2021 for diarrhea; now reports she is experiencing just constipation. Denies any hematochezia or melena. She has tried OTC laxatives and stool softeners without relief; also tried Movantik but this caused a lot of stomach cramping. Reports lactulose has helped with constipation in the past. She had colonoscopy in 2008; denies any FMH of CRC or IBD.       Previous workup:EGD    Last colonoscopy was 01/24/2024 with 5 yr recall.    Interval   - Patient continues to experience upper abdominal pain, bloating, and GERD  - EGD w/ dilation improved dysphagia; findings consistent with gastritis   - Colonoscopy UTD    Review of Systems   Constitutional:  Negative for weight loss.   Gastrointestinal:  Positive for abdominal pain, constipation, heartburn and nausea. Negative for blood in stool, diarrhea and melena.       Past Medical History      Past Medical History:   Diagnosis Date    Depression 7/31/2012    GERD (gastroesophageal reflux  disease)     Herniation of intervertebral disc between L4 and L5     L4, L5, S1    LBP (low back pain) 2012    Sciatica        Past Surgical History     Past Surgical History:   Procedure Laterality Date    ANTERIOR CERVICAL DISCECTOMY W/ FUSION  2016    BACK SURGERY      back surgery      CHOLECYSTECTOMY  2012    TONSILLECTOMY         Allergies     Review of patient's allergies indicates:   Allergen Reactions    Perflutren lipid microspheres Shortness Of Breath, Nausea Only, Rash and Other (See Comments)     Red faced, short of breath and nauseated.     Amoxicillin Rash and Other (See Comments)       Immunization History     Immunization History   Administered Date(s) Administered    COVID-19, MRNA, LN-S, PF (MODERNA FULL 0.5 ML DOSE) 2021, 08/10/2021    DTP 1973, 1973, 1973, 1974, 1977    IPV 1973, 1973, 1973, 1974, 1977    Influenza - Quadrivalent - PF *Preferred* (6 months and older) 2013, 10/24/2019, 2020, 10/27/2021, 2023, 2024    Influenza Split 2013    Measles / Rubella 1973    Pneumococcal Conjugate - 20 Valent 2023    Td (ADULT) 1995       Past Family History      Family History   Problem Relation Age of Onset    Hypertension Mother     Diabetes Mother     Neuropathy Father        Past Social History      Social History     Socioeconomic History    Marital status: Single   Tobacco Use    Smoking status: Former     Current packs/day: 0.00     Average packs/day: 0.1 packs/day for 2.0 years (0.3 ttl pk-yrs)     Types: Cigarettes     Start date: 2009     Quit date: 2011     Years since quittin.0    Smokeless tobacco: Never    Tobacco comments:     quit 1 1/2 years ago   Substance and Sexual Activity    Alcohol use: Not Currently    Drug use: Never       Current Medications     Outpatient Medications Marked as Taking for the 24 encounter (Office Visit) with  Tri Head, KOLE   Medication Sig Dispense Refill    baclofen (LIORESAL) 10 MG tablet Take 1 tablet (10 mg total) by mouth 3 (three) times daily. 180 tablet 1    cetirizine (ZYRTEC) 10 MG tablet Take 1 tablet (10 mg total) by mouth once daily. 90 tablet 1    citalopram (CELEXA) 10 MG tablet Take 1 tablet (10 mg total) by mouth once daily. 30 tablet 1    fluticasone propionate (FLONASE) 50 mcg/actuation nasal spray 2 sprays (100 mcg total) by Each Nostril route daily as needed for Rhinitis. 16 g 3    furosemide (LASIX) 20 MG tablet Take 1 tablet (20 mg total) by mouth once daily. 90 tablet 1    gabapentin (NEURONTIN) 300 MG capsule 1 capsule before bedtime Orally Twice a day      guaiFENesin (MUCINEX) 600 mg 12 hr tablet Take 1 tablet (600 mg total) by mouth 2 (two) times daily. 20 tablet 0    hydrOXYzine (ATARAX) 50 MG tablet 1 tablet as needed Orally every 6 hrs      lactulose (CHRONULAC) 10 gram/15 mL solution Take 15 mLs (10 g total) by mouth 2 (two) times daily as needed (constipation). 237 mL 0    lamoTRIgine (LAMICTAL) 200 MG tablet       meloxicam (MOBIC) 15 MG tablet Take 1 tablet (15 mg total) by mouth once daily. 90 tablet 1    mirtazapine (REMERON) 15 MG tablet Take 1 tablet (15 mg total) by mouth every evening. 90 tablet 1    mupirocin (BACTROBAN) 2 % ointment Apply topically 2 (two) times daily. Apply to affected area. 1 g 3    omeprazole (PRILOSEC) 40 MG capsule Take 1 capsule (40 mg total) by mouth once daily. 90 capsule 3    oxycodone-acetaminophen  mg (PERCOCET)  mg per tablet Take 1 tablet by mouth daily as needed.      potassium chloride (KLOR-CON) 8 MEQ TbSR Take 1 tablet (8 mEq total) by mouth once daily. 90 tablet 1    prazosin (MINIPRESS) 1 MG Cap       pregabalin (LYRICA) 150 MG capsule Take 2 capsules (300 mg total) by mouth once daily. 30 capsule 1    promethazine (PHENERGAN) 25 MG tablet Take 1 tablet (25 mg total) by mouth every 8 (eight) hours as needed for Nausea. 30  "tablet 1    propranoloL (INDERAL) 20 MG tablet       topiramate (TOPAMAX) 100 MG tablet Take 1 tablet by mouth 2 (two) times a day.      ziprasidone (GEODON) 60 MG Cap Take 1 capsule (60 mg total) by mouth once daily. 90 capsule 1        I have reviewed the current medications, allergies, vital signs, past medical and surgical history, family medical history, and social history for this encounter and agree with all findings.    OBJECTIVE    Physical Exam    BP (!) 185/100   Pulse 92   Ht 5' 9" (1.753 m)   Wt 114.3 kg (252 lb)   SpO2 99%   BMI 37.21 kg/m²   GEN: Well appearing, cooperative, NAD, Obese  NECK: Supple, no LAD  CV: Normal rate  RESP: Unlabored  ABD: ND, NT, soft, no guarding  EXT: No clubbing, cyanosis, or edema  SKIN: Warm and dry  NEURO: AAO x4.     LABS    CBC (with or without Differential):   Lab Results   Component Value Date    WBC 5.37 09/12/2023    WBC 6.41 10/18/2012    HGB 14.6 09/12/2023    HGB 13.6 10/18/2012    HCT 45.1 09/12/2023    HCT 41.0 10/18/2012    MCV 89.5 09/12/2023    MCV 85.1 10/18/2012    MCH 29.0 09/12/2023    MCH 28.2 10/18/2012    MCHC 32.4 09/12/2023    MCHC 33.2 10/18/2012    RDW 13.3 09/12/2023    RDW 13.3 10/18/2012     09/12/2023     (H) 10/18/2012    MPV 9.5 09/12/2023    MPV 8.9 (L) 10/18/2012    NEUTOPHILPCT 59.0 09/12/2023    DIFFTYPE Auto 09/12/2023     BMP/CMP:   Lab Results   Component Value Date     09/12/2023     10/18/2012    K 4.2 09/12/2023    K 3.9 10/18/2012     09/12/2023     10/18/2012    CO2 28 09/12/2023    CO2 22 (L) 10/18/2012    BUN 9 09/12/2023    BUN 5 (L) 10/18/2012    CREATININE 0.91 09/12/2023    CREATININE 0.7 10/18/2012     09/12/2023    GLU 97 10/18/2012    CALCIUM 9.0 09/12/2023    CALCIUM 9.0 10/18/2012    ALBUMIN 3.8 10/18/2012    AST 18 10/18/2012    ALT 15 10/18/2012    ALKPHOS 95 10/18/2012        IMAGING  No pertinent imaging.    ASSESSMENT  Shayy Hunter is a 53 y.o. WF with PMH of " GERD and depression who is referred for follow-up.    1. Epigastric pain    2. Gastroesophageal reflux disease without esophagitis    3. Chronic idiopathic constipation           PLAN    - Schedule Gastric emptying study to rule out gastroparesis     Patient Instructions   - Gastric emptying study to rule out gastroparesis   - I recommend a bowel cleanse with a gatorade miralax prep: take 20 mg of dulcolax orally and then mix 238 grams of miralax in 64 oz of your favorite zero sugar gatorade and drink over a 4 hour period on a day when you will be at home  - After your bowel cleanse, I recommend starting a daily fiber supplement with Citrucel or Fibercon (can purchase at your local pharmacy)  - I recommend that you also use Miralax 17 grams daily-to-twice daily as needed to have a regular, soft BM without straining - you can adjust how often you need miralax, but start with daily dosing and go from there  - I recommend drinking at least 60-80 ounces of water daily unless you have a medical condition that requires fluid restriction         Orders Placed This Encounter   Procedures    NM Gastric Emptying     Standing Status:   Future     Standing Expiration Date:   1/31/2025     Order Specific Question:   May the Radiologist modify the order per protocol to meet the clinical needs of the patient?     Answer:   Yes     Order Specific Question:   Release to patient     Answer:   Immediate         The risks and benefits of my recommendations, as well as other treatment options were discussed with the patient today. All questions were answered.    25 minutes of total time spent on the encounter, which includes face to face time and non-face to face time preparing to see the patient (eg, review of tests), obtaining and/or reviewing separately obtained history, documenting clinical information in the electronic or other health record, Independently interpreting results (not separately reported) and communicating results to  the patient/family/caregiver, or care coordination (not separately reported).        Tri Head, CÉSARP/ACNP  [facility]

## 2024-02-13 PROBLEM — E66.812 CLASS 2 OBESITY WITH BODY MASS INDEX (BMI) OF 37.0 TO 37.9 IN ADULT: Status: ACTIVE | Noted: 2023-05-17

## 2024-02-13 PROBLEM — R21 RASH: Status: ACTIVE | Noted: 2024-02-13

## 2024-02-13 NOTE — PROGRESS NOTES
Subjective:       Patient ID: Shayy Hunter is a 53 y.o. female.    Chief Complaint: Back Pain, Neck Pain, and Rectal Pain    Back Pain  Pertinent negatives include no abdominal pain, chest pain or fever.   Neck Pain   Pertinent negatives include no chest pain or fever.   Patient presents with chronic obesity patient also requesting a flu shot today.  Patient does have a rash to trunk will write for mupirocin 2% to apply to the affected area.  Will also order a flu shot health maintenance issues refer to ophthalmology and podiatry.  For obesity recommend low carb diet exercise and lifestyle modification  .    Current Medications:    Current Outpatient Medications:     baclofen (LIORESAL) 10 MG tablet, Take 1 tablet (10 mg total) by mouth 3 (three) times daily., Disp: 180 tablet, Rfl: 1    cetirizine (ZYRTEC) 10 MG tablet, Take 1 tablet (10 mg total) by mouth once daily., Disp: 90 tablet, Rfl: 1    citalopram (CELEXA) 10 MG tablet, Take 1 tablet (10 mg total) by mouth once daily., Disp: 30 tablet, Rfl: 1    fluticasone propionate (FLONASE) 50 mcg/actuation nasal spray, 2 sprays (100 mcg total) by Each Nostril route daily as needed for Rhinitis., Disp: 16 g, Rfl: 3    furosemide (LASIX) 20 MG tablet, Take 1 tablet (20 mg total) by mouth once daily., Disp: 90 tablet, Rfl: 1    gabapentin (NEURONTIN) 300 MG capsule, 1 capsule before bedtime Orally Twice a day, Disp: , Rfl:     guaiFENesin (MUCINEX) 600 mg 12 hr tablet, Take 1 tablet (600 mg total) by mouth 2 (two) times daily., Disp: 20 tablet, Rfl: 0    hydrOXYzine (ATARAX) 50 MG tablet, 1 tablet as needed Orally every 6 hrs, Disp: , Rfl:     lactulose (CHRONULAC) 10 gram/15 mL solution, Take 15 mLs (10 g total) by mouth 2 (two) times daily as needed (constipation)., Disp: 237 mL, Rfl: 0    lamoTRIgine (LAMICTAL) 200 MG tablet, , Disp: , Rfl:     meloxicam (MOBIC) 15 MG tablet, Take 1 tablet (15 mg total) by mouth once daily., Disp: 90 tablet, Rfl: 1    mirtazapine  (REMERON) 15 MG tablet, Take 1 tablet (15 mg total) by mouth every evening., Disp: 90 tablet, Rfl: 1    omeprazole (PRILOSEC) 40 MG capsule, Take 1 capsule (40 mg total) by mouth once daily., Disp: 90 capsule, Rfl: 3    oxycodone-acetaminophen  mg (PERCOCET)  mg per tablet, Take 1 tablet by mouth daily as needed., Disp: , Rfl:     potassium chloride (KLOR-CON) 8 MEQ TbSR, Take 1 tablet (8 mEq total) by mouth once daily., Disp: 90 tablet, Rfl: 1    prazosin (MINIPRESS) 1 MG Cap, , Disp: , Rfl:     pregabalin (LYRICA) 150 MG capsule, Take 2 capsules (300 mg total) by mouth once daily., Disp: 30 capsule, Rfl: 1    promethazine (PHENERGAN) 25 MG tablet, Take 1 tablet (25 mg total) by mouth every 8 (eight) hours as needed for Nausea., Disp: 30 tablet, Rfl: 1    propranoloL (INDERAL) 20 MG tablet, , Disp: , Rfl:     topiramate (TOPAMAX) 100 MG tablet, Take 1 tablet by mouth 2 (two) times a day., Disp: , Rfl:     ziprasidone (GEODON) 60 MG Cap, Take 1 capsule (60 mg total) by mouth once daily., Disp: 90 capsule, Rfl: 1    mupirocin (BACTROBAN) 2 % ointment, Apply topically 2 (two) times daily. Apply to affected area., Disp: 1 g, Rfl: 3           Review of Systems   Constitutional:  Negative for appetite change, fatigue and fever.   Respiratory:  Negative for shortness of breath.    Cardiovascular:  Negative for chest pain.   Gastrointestinal:  Negative for abdominal pain and constipation.   Endocrine: Negative for polydipsia, polyphagia and polyuria.   Genitourinary:  Negative for difficulty urinating, frequency and hot flashes.   Musculoskeletal:  Positive for back pain and neck pain.   Allergic/Immunologic: Negative for environmental allergies.   Neurological:  Negative for dizziness and light-headedness.   Psychiatric/Behavioral:  Negative for agitation.    All other systems reviewed and are negative.               Vitals:    01/31/24 1011   BP: 139/89   BP Location: Left arm   Patient Position: Sitting   BP  "Method: Large (Automatic)   Pulse: 91   Resp: 17   Temp: 97.6 °F (36.4 °C)   TempSrc: Temporal   SpO2: 95%   Weight: 113.4 kg (250 lb)   Height: 5' 9" (1.753 m)        Physical Exam  Vitals and nursing note reviewed.   Constitutional:       Appearance: Normal appearance.   Cardiovascular:      Rate and Rhythm: Normal rate and regular rhythm.      Pulses: Normal pulses.      Heart sounds: Normal heart sounds.   Pulmonary:      Effort: Pulmonary effort is normal.      Breath sounds: Normal breath sounds.   Abdominal:      General: Abdomen is flat. Bowel sounds are normal.      Palpations: Abdomen is soft.   Musculoskeletal:         General: Normal range of motion.   Skin:     General: Skin is warm and dry.   Neurological:      General: No focal deficit present.      Mental Status: She is alert and oriented to person, place, and time. Mental status is at baseline.           Last Labs:     Hospital Outpatient Visit on 01/24/2024   Component Date Value    Case Report 01/24/2024                      Value:Surgical Pathology                                Case: [id number]                                   Authorizing Provider:  Luiz Hutchinson MD           Collected:           01/24/2024 12:08 PM          Ordering Location:     [facility] -         Received:            01/24/2024 01:20 PM                                 Endoscopy                                                                    Pathologist:           Michael Durant MD                                                      Specimens:   A) - Intestine Large, Ascending Colon, A- Ascending Colon Bx                                        B) - Intestine Large, Descending Colon, B- Descending Colon Bx                             Final Diagnosis 01/24/2024                      Value:This result contains rich text formatting which cannot be displayed here.    Gross Description 01/24/2024                      Value:This result contains rich text formatting " which cannot be displayed here.    Microscopic Description 01/24/2024                      Value:This result contains rich text formatting which cannot be displayed here.    Laboratory Notes 01/24/2024                      Value:This result contains rich text formatting which cannot be displayed here.       Last Imaging:  Colonoscopy  Narrative: Table formatting from the original result was not included.  Procedure Date  1/24/24    Impression  Overall   Impression:    3 subcentimeter polyps were removed  Scattered diverticulosis of moderate severity in the descending colon and   sigmoid colon  The ileocecal valve, cecum and transverse colon appeared normal.  (grade 2) hemorrhoids    Recommendation   Await pathology results    Repeat colonoscopy in 5 years     Outcome of procedure: successful Colonoscopy  Disposition: patient to recovery following procedure; discharge to home   when appropriate parameters met  Provisions for follow up: please call my office for any unexpected   symptoms like chest or abdominal pain or bleeding following your   procedure.  Final Diagnosis: colon polyps     Indication  Screening for colon cancer    Providers  Lexi Mills Technician   Zak Peace RN Nurse   Alan Lim, CANDIE CRNA   Luiz Hutchinson MD Proceduralist     Medications  Moderate sedation administered by anesthesia staff - See anesthesia   record.    Preprocedure  A history and physical has been performed, and patient medication   allergies have been reviewed. The patient's tolerance of previous   anesthesia has been reviewed. The risks and benefits of the procedure and   the sedation options and risks were discussed with the patient. All   questions were answered and informed consent obtained.    ASA Score: ASA 3 - Patient with moderate systemic disease with functional   limitations  Mallampati Airway Score: II (hard and soft palate, upper portion of   tonsils anduvula visible)    Details of the Procedure  The  patient underwent monitored anesthesia care, which was administered by   an anesthesia professional. The patient's heart rate, blood pressure,   level of consciousness, respirations, oxygen, ECG and ETCO2 were monitored   throughout the procedure. A digital rectal exam was performed. A perianal   exam was performed. The scope was introduced through the anus and advanced   to the cecum. Retroflexion was not performed due to endocuff. The quality   of bowel preparation was evaluated using the [place] Bowel Preparation   Scale with scores of: right colon = 2, transverse colon = 2, left colon =   2. The total BBPS score was 6. Bowel prep was adequate. The patient's   estimated blood loss was minimal (<5 mL). The procedure was not difficult.   The patient tolerated the procedure well. There were no apparent adverse   events.     Scope: Colonoscope  Scope Serial: [id number]    Events    Procedure Events   Event Event Time     Procedure Events   Event Event Time   ENDO SCOPE IN TIME 1/24/2024 12:01 PM   ENDO CECUM REACHED 1/24/2024 12:04 PM   ENDO SCOPE OUT TIME 1/24/2024 12:20 PM     CECAL WITHDRAWAL TIME: 16m 35s    Findings  One sessile, adenomatous-appearing polyp measuring smaller than 5 mm in   the ascending colon; performed cold forceps biopsy with complete en bloc   removal  Two sessile, adenomatous-appearing polyps measuring 5-9 mm in the   descending colon; performed cold snare with complete en bloc removal and   retrieved specimen  Few small and medium, scattered diverticula of moderate severity in the   descending colon and sigmoid colon; no bleeding was identified  The ileocecal valve, cecum and transverse colon appeared normal.  Internal (grade 2) hemorrhoids; no bleeding was identified         **Labs and x-rays personally reviewed by me    ** reviewed      Objective:        Assessment:       1. Class 2 obesity with body mass index (BMI) of 37.0 to 37.9 in adult, unspecified obesity type, unspecified whether  serious comorbidity present        2. Rash             Plan:         1. Class 2 obesity with body mass index (BMI) of 37.0 to 37.9 in adult, unspecified obesity type, unspecified whether serious comorbidity present    2. Rash    Other orders  -     Influenza - Quadrivalent *Preferred* (6 months+) (PF)  -     mupirocin (BACTROBAN) 2 % ointment; Apply topically 2 (two) times daily. Apply to affected area.  Dispense: 1 g; Refill: 3

## 2024-03-05 ENCOUNTER — OFFICE VISIT (OUTPATIENT)
Dept: FAMILY MEDICINE | Facility: CLINIC | Age: 54
End: 2024-03-05
Payer: MEDICARE

## 2024-03-05 ENCOUNTER — APPOINTMENT (OUTPATIENT)
Dept: RADIOLOGY | Facility: CLINIC | Age: 54
End: 2024-03-05
Attending: INTERNAL MEDICINE
Payer: MEDICARE

## 2024-03-05 VITALS
TEMPERATURE: 97 F | BODY MASS INDEX: 36.88 KG/M2 | OXYGEN SATURATION: 98 % | WEIGHT: 249 LBS | HEIGHT: 69 IN | HEART RATE: 98 BPM | DIASTOLIC BLOOD PRESSURE: 94 MMHG | RESPIRATION RATE: 18 BRPM | SYSTOLIC BLOOD PRESSURE: 136 MMHG

## 2024-03-05 DIAGNOSIS — M62.838 MUSCLE SPASM: ICD-10-CM

## 2024-03-05 DIAGNOSIS — M25.511 RIGHT SHOULDER PAIN, UNSPECIFIED CHRONICITY: Primary | ICD-10-CM

## 2024-03-05 DIAGNOSIS — M19.011 OSTEOARTHRITIS OF RIGHT SHOULDER, UNSPECIFIED OSTEOARTHRITIS TYPE: ICD-10-CM

## 2024-03-05 DIAGNOSIS — M25.511 RIGHT SHOULDER PAIN, UNSPECIFIED CHRONICITY: ICD-10-CM

## 2024-03-05 DIAGNOSIS — R11.0 NAUSEA: Chronic | ICD-10-CM

## 2024-03-05 DIAGNOSIS — R09.81 CONGESTION OF NASAL SINUS: ICD-10-CM

## 2024-03-05 PROCEDURE — 96372 THER/PROPH/DIAG INJ SC/IM: CPT | Mod: ,,, | Performed by: INTERNAL MEDICINE

## 2024-03-05 PROCEDURE — 73030 X-RAY EXAM OF SHOULDER: CPT | Mod: TC,RHCUB,RT | Performed by: INTERNAL MEDICINE

## 2024-03-05 PROCEDURE — 99214 OFFICE O/P EST MOD 30 MIN: CPT | Mod: ,,, | Performed by: INTERNAL MEDICINE

## 2024-03-05 RX ORDER — BACLOFEN 10 MG/1
10 TABLET ORAL 3 TIMES DAILY
Qty: 180 TABLET | Refills: 1 | Status: SHIPPED | OUTPATIENT
Start: 2024-03-05

## 2024-03-05 RX ORDER — FLUTICASONE PROPIONATE 50 MCG
2 SPRAY, SUSPENSION (ML) NASAL DAILY PRN
Qty: 16 G | Refills: 3 | Status: SHIPPED | OUTPATIENT
Start: 2024-03-05

## 2024-03-05 RX ORDER — PROMETHAZINE HYDROCHLORIDE 25 MG/1
25 TABLET ORAL EVERY 8 HOURS PRN
Qty: 30 TABLET | Refills: 1 | Status: SHIPPED | OUTPATIENT
Start: 2024-03-05 | End: 2024-04-08 | Stop reason: SDUPTHER

## 2024-03-05 RX ORDER — KETOROLAC TROMETHAMINE 30 MG/ML
30 INJECTION, SOLUTION INTRAMUSCULAR; INTRAVENOUS
Status: COMPLETED | OUTPATIENT
Start: 2024-03-05 | End: 2024-03-05

## 2024-03-05 RX ADMIN — KETOROLAC TROMETHAMINE 30 MG: 30 INJECTION, SOLUTION INTRAMUSCULAR; INTRAVENOUS at 09:03

## 2024-03-06 ENCOUNTER — TELEPHONE (OUTPATIENT)
Dept: ORTHOPEDICS | Facility: CLINIC | Age: 54
End: 2024-03-06
Payer: MEDICARE

## 2024-03-09 NOTE — PROGRESS NOTES
Subjective:       Patient ID: Shayy Huntre is a 53 y.o. female.    Chief Complaint: Neck Pain, Shoulder Pain, and Back Pain    Neck Pain     Shoulder Pain     Back Pain    Patient has had multiple complaints neck pain shoulder pain back patient also complains of runny nose.  Patient has tenderness in the C-spine area tenderness in the right shoulder as well.  Patient states that her pain is a 9/10.  Patient also has allergic rhinitis will continue Flonase 1 spray q.day see additional plans below  .    Current Medications:    Current Outpatient Medications:     cetirizine (ZYRTEC) 10 MG tablet, Take 1 tablet (10 mg total) by mouth once daily., Disp: 90 tablet, Rfl: 1    citalopram (CELEXA) 10 MG tablet, Take 1 tablet (10 mg total) by mouth once daily., Disp: 30 tablet, Rfl: 1    furosemide (LASIX) 20 MG tablet, Take 1 tablet (20 mg total) by mouth once daily., Disp: 90 tablet, Rfl: 1    gabapentin (NEURONTIN) 300 MG capsule, 1 capsule before bedtime Orally Twice a day, Disp: , Rfl:     guaiFENesin (MUCINEX) 600 mg 12 hr tablet, Take 1 tablet (600 mg total) by mouth 2 (two) times daily., Disp: 20 tablet, Rfl: 0    hydrOXYzine (ATARAX) 50 MG tablet, 1 tablet as needed Orally every 6 hrs, Disp: , Rfl:     lactulose (CHRONULAC) 10 gram/15 mL solution, Take 15 mLs (10 g total) by mouth 2 (two) times daily as needed (constipation)., Disp: 237 mL, Rfl: 0    lamoTRIgine (LAMICTAL) 200 MG tablet, , Disp: , Rfl:     meloxicam (MOBIC) 15 MG tablet, Take 1 tablet (15 mg total) by mouth once daily., Disp: 90 tablet, Rfl: 1    mirtazapine (REMERON) 15 MG tablet, Take 1 tablet (15 mg total) by mouth every evening., Disp: 90 tablet, Rfl: 1    mupirocin (BACTROBAN) 2 % ointment, Apply topically 2 (two) times daily. Apply to affected area., Disp: 1 g, Rfl: 3    omeprazole (PRILOSEC) 40 MG capsule, Take 1 capsule (40 mg total) by mouth once daily., Disp: 90 capsule, Rfl: 3    oxycodone-acetaminophen  mg (PERCOCET)  mg per  "tablet, Take 1 tablet by mouth daily as needed., Disp: , Rfl:     potassium chloride (KLOR-CON) 8 MEQ TbSR, Take 1 tablet (8 mEq total) by mouth once daily., Disp: 90 tablet, Rfl: 1    prazosin (MINIPRESS) 1 MG Cap, , Disp: , Rfl:     pregabalin (LYRICA) 150 MG capsule, Take 2 capsules (300 mg total) by mouth once daily., Disp: 30 capsule, Rfl: 1    propranoloL (INDERAL) 20 MG tablet, , Disp: , Rfl:     topiramate (TOPAMAX) 100 MG tablet, Take 1 tablet by mouth 2 (two) times a day., Disp: , Rfl:     ziprasidone (GEODON) 60 MG Cap, Take 1 capsule (60 mg total) by mouth once daily., Disp: 90 capsule, Rfl: 1    baclofen (LIORESAL) 10 MG tablet, Take 1 tablet (10 mg total) by mouth 3 (three) times daily., Disp: 180 tablet, Rfl: 1    fluticasone propionate (FLONASE) 50 mcg/actuation nasal spray, 2 sprays (100 mcg total) by Each Nostril route daily as needed for Rhinitis., Disp: 16 g, Rfl: 3    promethazine (PHENERGAN) 25 MG tablet, Take 1 tablet (25 mg total) by mouth every 8 (eight) hours as needed for Nausea., Disp: 30 tablet, Rfl: 1           Review of Systems   Musculoskeletal:  Positive for back pain and neck pain.                Vitals:    03/05/24 0906   BP: (!) 136/94   BP Location: Left arm   Patient Position: Sitting   BP Method: Large (Automatic)   Pulse: 98   Resp: 18   Temp: 97.4 °F (36.3 °C)   TempSrc: Temporal   SpO2: 98%   Weight: 112.9 kg (249 lb)   Height: 5' 9" (1.753 m)        Physical Exam  Vitals and nursing note reviewed.   Constitutional:       Appearance: Normal appearance.   Cardiovascular:      Rate and Rhythm: Normal rate and regular rhythm.      Pulses: Normal pulses.      Heart sounds: Normal heart sounds.   Pulmonary:      Effort: Pulmonary effort is normal.      Breath sounds: Normal breath sounds.   Abdominal:      General: Abdomen is flat. Bowel sounds are normal.      Palpations: Abdomen is soft.   Musculoskeletal:         General: Normal range of motion.   Skin:     General: Skin is " warm and dry.   Neurological:      General: No focal deficit present.      Mental Status: She is alert and oriented to person, place, and time. Mental status is at baseline.           Last Labs:     No visits with results within 1 Month(s) from this visit.   Latest known visit with results is:   Hospital Outpatient Visit on 01/24/2024   Component Date Value    Case Report 01/24/2024                      Value:Surgical Pathology                                Case: [id number]                                   Authorizing Provider:  Luiz Hutchinson MD           Collected:           01/24/2024 12:08 PM          Ordering Location:     [facility] -         Received:            01/24/2024 01:20 PM                                 Endoscopy                                                                    Pathologist:           Michael Durant MD                                                      Specimens:   A) - Intestine Large, Ascending Colon, A- Ascending Colon Bx                                        B) - Intestine Large, Descending Colon, B- Descending Colon Bx                             Final Diagnosis 01/24/2024                      Value:This result contains rich text formatting which cannot be displayed here.    Gross Description 01/24/2024                      Value:This result contains rich text formatting which cannot be displayed here.    Microscopic Description 01/24/2024                      Value:This result contains rich text formatting which cannot be displayed here.    Laboratory Notes 01/24/2024                      Value:This result contains rich text formatting which cannot be displayed here.       Last Imaging:  X-Ray Cervical Spine 2 or 3 Views  Narrative: EXAMINATION:  XR CERVICAL SPINE 2 OR 3 VIEWS    CLINICAL HISTORY:  Pain in right shoulder    COMPARISON:  Cervical spine x-ray September 26, 2023    TECHNIQUE:  Frontal, lateral, and open-mouth odontoid views of the cervical  spine.    FINDINGS:  Anterior fusion hardware again noted C4 through C7.  Osseous fusion C3 through C6.  Straightening of normal cervical lordosis.  Similar overall appearance to prior.  Impression: As above.    Point of Service: [facility]    Electronically signed by: Tyrone Asher  Date:    03/05/2024  Time:    10:28  X-ray Shoulder 2 or More Views Right  Narrative: EXAMINATION:  XR SHOULDER COMPLETE 2 OR MORE VIEWS RIGHT    CLINICAL HISTORY:  Pain in right shoulder    COMPARISON:  3 October 2013    TECHNIQUE:  XR SHOULDER 2 VIEWS RIGHT    FINDINGS:  No evidence of fracture seen.  The alignment of the joints appears normal.  Mild shoulder degenerative change is present.  No soft tissue abnormality is seen.  Impression: Mild shoulder osteoarthrosis.    Electronically signed by: Sha Rios  Date:    03/05/2024  Time:    10:24         **Labs and x-rays personally reviewed by me    ** reviewed      Objective:        Assessment:       1. Right shoulder pain, unspecified chronicity  ketorolac injection 30 mg    X-Ray Cervical Spine 2 or 3 Views    X-ray Shoulder 2 or More Views Right      2. Muscle spasm  baclofen (LIORESAL) 10 MG tablet      3. Congestion of nasal sinus  fluticasone propionate (FLONASE) 50 mcg/actuation nasal spray      4. Nausea  promethazine (PHENERGAN) 25 MG tablet    Refill her Phenergan.      5. Osteoarthritis of right shoulder, unspecified osteoarthritis type  Ambulatory referral/consult to Orthopedics    Ambulatory referral/consult to Physical/Occupational Therapy           Plan:         1. Right shoulder pain, unspecified chronicity  -     ketorolac injection 30 mg  -     X-Ray Cervical Spine 2 or 3 Views; Future; Expected date: 03/05/2024  -     X-ray Shoulder 2 or More Views Right; Future; Expected date: 03/05/2024    2. Muscle spasm  -     baclofen (LIORESAL) 10 MG tablet; Take 1 tablet (10 mg total) by mouth 3 (three) times daily.  Dispense: 180 tablet; Refill: 1    3.  Congestion of nasal sinus  -     fluticasone propionate (FLONASE) 50 mcg/actuation nasal spray; 2 sprays (100 mcg total) by Each Nostril route daily as needed for Rhinitis.  Dispense: 16 g; Refill: 3    4. Nausea  Comments:  Refill her Phenergan.  Overview:  Refill her Phenergan.    Orders:  -     promethazine (PHENERGAN) 25 MG tablet; Take 1 tablet (25 mg total) by mouth every 8 (eight) hours as needed for Nausea.  Dispense: 30 tablet; Refill: 1    5. Osteoarthritis of right shoulder, unspecified osteoarthritis type  -     Ambulatory referral/consult to Orthopedics; Future; Expected date: 03/12/2024  -     Ambulatory referral/consult to Physical/Occupational Therapy; Future; Expected date: 03/12/2024

## 2024-03-11 ENCOUNTER — TELEPHONE (OUTPATIENT)
Dept: ORTHOPEDICS | Facility: CLINIC | Age: 54
End: 2024-03-11
Payer: MEDICARE

## 2024-04-08 DIAGNOSIS — R11.0 NAUSEA: Chronic | ICD-10-CM

## 2024-04-09 DIAGNOSIS — K21.9 GASTROESOPHAGEAL REFLUX DISEASE WITHOUT ESOPHAGITIS: ICD-10-CM

## 2024-04-09 RX ORDER — OMEPRAZOLE 40 MG/1
40 CAPSULE, DELAYED RELEASE ORAL
Qty: 180 CAPSULE | Refills: 3 | Status: SHIPPED | OUTPATIENT
Start: 2024-04-09 | End: 2025-04-09

## 2024-04-10 ENCOUNTER — TELEPHONE (OUTPATIENT)
Dept: ORTHOPEDICS | Facility: CLINIC | Age: 54
End: 2024-04-10
Payer: MEDICARE

## 2024-04-17 RX ORDER — PROMETHAZINE HYDROCHLORIDE 25 MG/1
25 TABLET ORAL EVERY 8 HOURS PRN
Qty: 30 TABLET | Refills: 1 | Status: SHIPPED | OUTPATIENT
Start: 2024-04-17

## 2024-05-08 ENCOUNTER — OFFICE VISIT (OUTPATIENT)
Dept: FAMILY MEDICINE | Facility: CLINIC | Age: 54
End: 2024-05-08
Payer: MEDICARE

## 2024-05-08 VITALS
OXYGEN SATURATION: 99 % | RESPIRATION RATE: 18 BRPM | TEMPERATURE: 98 F | SYSTOLIC BLOOD PRESSURE: 135 MMHG | HEIGHT: 69 IN | HEART RATE: 91 BPM | BODY MASS INDEX: 35.7 KG/M2 | WEIGHT: 241 LBS | DIASTOLIC BLOOD PRESSURE: 81 MMHG

## 2024-05-08 DIAGNOSIS — K57.90 DIVERTICULOSIS: ICD-10-CM

## 2024-05-08 DIAGNOSIS — Z13.1 SCREENING FOR DIABETES MELLITUS (DM): Primary | ICD-10-CM

## 2024-05-08 DIAGNOSIS — Z12.4 CERVICAL CANCER SCREENING: ICD-10-CM

## 2024-05-08 DIAGNOSIS — E11.9 DM (DIABETES MELLITUS): ICD-10-CM

## 2024-05-08 DIAGNOSIS — E75.5 OTHER LIPID STORAGE DISORDERS: ICD-10-CM

## 2024-05-08 LAB
CHOLEST SERPL-MCNC: 174 MG/DL (ref 0–200)
CHOLEST/HDLC SERPL: 3.2 {RATIO}
EST. AVERAGE GLUCOSE BLD GHB EST-MCNC: 111 MG/DL
HBA1C MFR BLD HPLC: 5.5 % (ref 4.5–6.6)
HDLC SERPL-MCNC: 54 MG/DL (ref 40–60)
LDLC SERPL CALC-MCNC: 104 MG/DL
LDLC/HDLC SERPL: 1.9 {RATIO}
NONHDLC SERPL-MCNC: 120 MG/DL
TRIGL SERPL-MCNC: 79 MG/DL (ref 35–150)
VLDLC SERPL-MCNC: 16 MG/DL

## 2024-05-08 PROCEDURE — 99214 OFFICE O/P EST MOD 30 MIN: CPT | Mod: ,,, | Performed by: INTERNAL MEDICINE

## 2024-05-08 PROCEDURE — 83036 HEMOGLOBIN GLYCOSYLATED A1C: CPT | Mod: ,,, | Performed by: CLINICAL MEDICAL LABORATORY

## 2024-05-08 PROCEDURE — 80061 LIPID PANEL: CPT | Mod: ,,, | Performed by: CLINICAL MEDICAL LABORATORY

## 2024-05-08 RX ORDER — DOCUSATE SODIUM 100 MG/1
100 CAPSULE, LIQUID FILLED ORAL 2 TIMES DAILY
Qty: 60 CAPSULE | Refills: 6 | Status: SHIPPED | OUTPATIENT
Start: 2024-05-08

## 2024-05-13 PROBLEM — K57.90 DIVERTICULOSIS: Status: ACTIVE | Noted: 2024-05-13

## 2024-05-13 PROBLEM — E75.5 OTHER LIPID STORAGE DISORDERS: Status: ACTIVE | Noted: 2024-05-13

## 2024-05-13 PROBLEM — Z13.1 SCREENING FOR DIABETES MELLITUS (DM): Status: ACTIVE | Noted: 2024-01-24

## 2024-05-13 NOTE — PROGRESS NOTES
Subjective:       Patient ID: Shayy Hunter is a 53 y.o. female.    Chief Complaint: Medication Refill, Neck Pain, and Shoulder Pain    HPI  .  Patient does have occasional constipation patient has chronic diverticulosis patient also has intermittent nausea  Patient told to increase his fiber intake will start Colace b.i.d.    Will also address health maintenance issues.    Current Medications:    Current Outpatient Medications:     baclofen (LIORESAL) 10 MG tablet, Take 1 tablet (10 mg total) by mouth 3 (three) times daily., Disp: 180 tablet, Rfl: 1    cetirizine (ZYRTEC) 10 MG tablet, Take 1 tablet (10 mg total) by mouth once daily., Disp: 90 tablet, Rfl: 1    citalopram (CELEXA) 10 MG tablet, Take 1 tablet (10 mg total) by mouth once daily., Disp: 30 tablet, Rfl: 1    fluticasone propionate (FLONASE) 50 mcg/actuation nasal spray, 2 sprays (100 mcg total) by Each Nostril route daily as needed for Rhinitis., Disp: 16 g, Rfl: 3    furosemide (LASIX) 20 MG tablet, Take 1 tablet (20 mg total) by mouth once daily., Disp: 90 tablet, Rfl: 1    gabapentin (NEURONTIN) 300 MG capsule, 1 capsule before bedtime Orally Twice a day, Disp: , Rfl:     guaiFENesin (MUCINEX) 600 mg 12 hr tablet, Take 1 tablet (600 mg total) by mouth 2 (two) times daily., Disp: 20 tablet, Rfl: 0    hydrOXYzine (ATARAX) 50 MG tablet, 1 tablet as needed Orally every 6 hrs, Disp: , Rfl:     lactulose (CHRONULAC) 10 gram/15 mL solution, Take 15 mLs (10 g total) by mouth 2 (two) times daily as needed (constipation)., Disp: 237 mL, Rfl: 0    lamoTRIgine (LAMICTAL) 200 MG tablet, , Disp: , Rfl:     meloxicam (MOBIC) 15 MG tablet, Take 1 tablet (15 mg total) by mouth once daily., Disp: 90 tablet, Rfl: 1    mirtazapine (REMERON) 15 MG tablet, Take 1 tablet (15 mg total) by mouth every evening., Disp: 90 tablet, Rfl: 1    mupirocin (BACTROBAN) 2 % ointment, Apply topically 2 (two) times daily. Apply to affected area., Disp: 1 g, Rfl: 3    omeprazole  "(PRILOSEC) 40 MG capsule, Take 1 capsule (40 mg total) by mouth 2 (two) times daily before meals., Disp: 180 capsule, Rfl: 3    oxycodone-acetaminophen  mg (PERCOCET)  mg per tablet, Take 1 tablet by mouth daily as needed., Disp: , Rfl:     potassium chloride (KLOR-CON) 8 MEQ TbSR, Take 1 tablet (8 mEq total) by mouth once daily., Disp: 90 tablet, Rfl: 1    prazosin (MINIPRESS) 1 MG Cap, , Disp: , Rfl:     pregabalin (LYRICA) 150 MG capsule, Take 2 capsules (300 mg total) by mouth once daily., Disp: 30 capsule, Rfl: 1    promethazine (PHENERGAN) 25 MG tablet, Take 1 tablet (25 mg total) by mouth every 8 (eight) hours as needed for Nausea., Disp: 30 tablet, Rfl: 1    propranoloL (INDERAL) 20 MG tablet, , Disp: , Rfl:     topiramate (TOPAMAX) 100 MG tablet, Take 1 tablet by mouth 2 (two) times a day., Disp: , Rfl:     ziprasidone (GEODON) 60 MG Cap, Take 1 capsule (60 mg total) by mouth once daily., Disp: 90 capsule, Rfl: 1    docusate sodium (COLACE) 100 MG capsule, Take 1 capsule (100 mg total) by mouth 2 (two) times daily., Disp: 60 capsule, Rfl: 6           ROS  Twelve point system reviewed, unremarkable except for stated above in HPI.        Objective:         Vitals:    05/08/24 1344   BP: 135/81   BP Location: Left arm   Patient Position: Sitting   BP Method: Large (Automatic)   Pulse: 91   Resp: 18   Temp: 97.7 °F (36.5 °C)   TempSrc: Temporal   SpO2: 99%   Weight: 109.3 kg (241 lb)   Height: 5' 9" (1.753 m)        Physical Exam     Patient is awake alert oriented person place and  Lungs are clear to auscultation bilaterally no crackles or wheezes   Cardiovascular S1-S2 regular rate and rhythm no murmurs rubs or gallops   Abdomen is soft positive bowel sounds nontender, extremities no clubbing cyanosis edema  Neuro no focal neurological deficits  Skin warm and dry.     Last Labs:     Office Visit on 05/08/2024   Component Date Value    Hemoglobin A1C 05/08/2024 5.5     Estimated Average Glucose " 05/08/2024 111     Triglycerides 05/08/2024 79     Cholesterol 05/08/2024 174     HDL Cholesterol 05/08/2024 54     Cholesterol/HDL Ratio (R* 05/08/2024 3.2     Non-HDL 05/08/2024 120     LDL Calculated 05/08/2024 104     LDL/HDL 05/08/2024 1.9     VLDL 05/08/2024 16        Last Imaging:  X-Ray Cervical Spine 2 or 3 Views  Narrative: EXAMINATION:  XR CERVICAL SPINE 2 OR 3 VIEWS    CLINICAL HISTORY:  Pain in right shoulder    COMPARISON:  Cervical spine x-ray September 26, 2023    TECHNIQUE:  Frontal, lateral, and open-mouth odontoid views of the cervical spine.    FINDINGS:  Anterior fusion hardware again noted C4 through C7.  Osseous fusion C3 through C6.  Straightening of normal cervical lordosis.  Similar overall appearance to prior.  Impression: As above.    Point of Service: [facility]    Electronically signed by: Tyrone Asher  Date:    03/05/2024  Time:    10:28  X-ray Shoulder 2 or More Views Right  Narrative: EXAMINATION:  XR SHOULDER COMPLETE 2 OR MORE VIEWS RIGHT    CLINICAL HISTORY:  Pain in right shoulder    COMPARISON:  3 October 2013    TECHNIQUE:  XR SHOULDER 2 VIEWS RIGHT    FINDINGS:  No evidence of fracture seen.  The alignment of the joints appears normal.  Mild shoulder degenerative change is present.  No soft tissue abnormality is seen.  Impression: Mild shoulder osteoarthrosis.    Electronically signed by: Sha Rios  Date:    03/05/2024  Time:    10:24         **Labs and x-rays personally reviewed by me    ** reviewed           Assessment & Plan:       1. Screening for diabetes mellitus (DM)  -     Hemoglobin A1C; Future; Expected date: 05/08/2024  -     Lipid Panel; Future; Expected date: 05/08/2024    2. DM (diabetes mellitus)  -     Hemoglobin A1C; Future; Expected date: 05/08/2024    3. Other lipid storage disorders  -     Lipid Panel; Future; Expected date: 05/08/2024    4. Cervical cancer screening  -     Ambulatory referral/consult to Gynecology; Future; Expected  date: 05/15/2024    5. Diverticulosis    Other orders  -     docusate sodium (COLACE) 100 MG capsule; Take 1 capsule (100 mg total) by mouth 2 (two) times daily.  Dispense: 60 capsule; Refill: 6            Juno Brady MD

## 2024-05-15 ENCOUNTER — OFFICE VISIT (OUTPATIENT)
Dept: ORTHOPEDICS | Facility: CLINIC | Age: 54
End: 2024-05-15
Payer: MEDICARE

## 2024-05-15 DIAGNOSIS — M25.511 RIGHT SHOULDER PAIN, UNSPECIFIED CHRONICITY: Primary | ICD-10-CM

## 2024-05-15 PROCEDURE — 99999PBSHW PR PBB SHADOW TECHNICAL ONLY FILED TO HB: Mod: PBBFAC,,,

## 2024-05-15 PROCEDURE — 99213 OFFICE O/P EST LOW 20 MIN: CPT | Mod: PBBFAC,25 | Performed by: NURSE PRACTITIONER

## 2024-05-15 PROCEDURE — 99214 OFFICE O/P EST MOD 30 MIN: CPT | Mod: S$PBB,25,, | Performed by: NURSE PRACTITIONER

## 2024-05-15 PROCEDURE — 20610 DRAIN/INJ JOINT/BURSA W/O US: CPT | Mod: PBBFAC | Performed by: NURSE PRACTITIONER

## 2024-05-15 RX ORDER — TRIAMCINOLONE ACETONIDE 40 MG/ML
80 INJECTION, SUSPENSION INTRA-ARTICULAR; INTRAMUSCULAR
Status: DISCONTINUED | OUTPATIENT
Start: 2024-05-15 | End: 2024-05-15 | Stop reason: HOSPADM

## 2024-05-15 RX ADMIN — TRIAMCINOLONE ACETONIDE 80 MG: 40 INJECTION, SUSPENSION INTRA-ARTICULAR; INTRAMUSCULAR at 10:05

## 2024-05-15 NOTE — PROGRESS NOTES
HPI:   Shayy Hunter is a pleasant 53 y.o. patient who reports to clinic for evaluation of right shoulder pain.     Injury onset and description: Patient has a history of frozen shoulder in both shoulders. She has a manipulation on the right side about 10 years ago.  She has developed a lot of weakness in her right shoulder.  It is very weak to lift overhead or to reach.  Also having pain in the shoulder.  Pain at rest and with activity.  No recent treatment.  She does take Mobic daily.  She reports she has had flare-ups in both shoulders from time to time.  Today she has painful, limited ROM.   Patient's occupation:  This is not a work related injury.   she has not had formal physical therapy recently   she has not had previous shoulder injections.   she has not had advanced imaging such as MRI.   The shoulder pain worsens with activity and overhead motion. Pain is disruptive to sleep at night.   The pain is better with rest. Treatment thus far has included rest and activity modification.   Here today to discuss diagnosis and treatment options.   VAS Pain Scale:  6  SANE Score: 60     PAST MEDICAL HISTORY:   Past Medical History:   Diagnosis Date    Depression 7/31/2012    GERD (gastroesophageal reflux disease)     Herniation of intervertebral disc between L4 and L5     L4, L5, S1    LBP (low back pain) 7/31/2012    Sciatica      PAST SURGICAL HISTORY:   Past Surgical History:   Procedure Laterality Date    ANTERIOR CERVICAL DISCECTOMY W/ FUSION  2016    BACK SURGERY  2012    back surgery  2015    CHOLECYSTECTOMY  03/01/2012    TONSILLECTOMY       MEDICATIONS:    Current Outpatient Medications:     baclofen (LIORESAL) 10 MG tablet, Take 1 tablet (10 mg total) by mouth 3 (three) times daily., Disp: 180 tablet, Rfl: 1    cetirizine (ZYRTEC) 10 MG tablet, Take 1 tablet (10 mg total) by mouth once daily., Disp: 90 tablet, Rfl: 1    citalopram (CELEXA) 10 MG tablet, Take 1 tablet (10 mg total) by mouth once daily.,  Disp: 30 tablet, Rfl: 1    docusate sodium (COLACE) 100 MG capsule, Take 1 capsule (100 mg total) by mouth 2 (two) times daily., Disp: 60 capsule, Rfl: 6    fluticasone propionate (FLONASE) 50 mcg/actuation nasal spray, 2 sprays (100 mcg total) by Each Nostril route daily as needed for Rhinitis., Disp: 16 g, Rfl: 3    furosemide (LASIX) 20 MG tablet, Take 1 tablet (20 mg total) by mouth once daily., Disp: 90 tablet, Rfl: 1    gabapentin (NEURONTIN) 300 MG capsule, 1 capsule before bedtime Orally Twice a day, Disp: , Rfl:     guaiFENesin (MUCINEX) 600 mg 12 hr tablet, Take 1 tablet (600 mg total) by mouth 2 (two) times daily., Disp: 20 tablet, Rfl: 0    hydrOXYzine (ATARAX) 50 MG tablet, 1 tablet as needed Orally every 6 hrs, Disp: , Rfl:     lactulose (CHRONULAC) 10 gram/15 mL solution, Take 15 mLs (10 g total) by mouth 2 (two) times daily as needed (constipation)., Disp: 237 mL, Rfl: 0    lamoTRIgine (LAMICTAL) 200 MG tablet, , Disp: , Rfl:     meloxicam (MOBIC) 15 MG tablet, Take 1 tablet (15 mg total) by mouth once daily., Disp: 90 tablet, Rfl: 1    mirtazapine (REMERON) 15 MG tablet, Take 1 tablet (15 mg total) by mouth every evening., Disp: 90 tablet, Rfl: 1    mupirocin (BACTROBAN) 2 % ointment, Apply topically 2 (two) times daily. Apply to affected area., Disp: 1 g, Rfl: 3    omeprazole (PRILOSEC) 40 MG capsule, Take 1 capsule (40 mg total) by mouth 2 (two) times daily before meals., Disp: 180 capsule, Rfl: 3    oxycodone-acetaminophen  mg (PERCOCET)  mg per tablet, Take 1 tablet by mouth daily as needed., Disp: , Rfl:     potassium chloride (KLOR-CON) 8 MEQ TbSR, Take 1 tablet (8 mEq total) by mouth once daily., Disp: 90 tablet, Rfl: 1    prazosin (MINIPRESS) 1 MG Cap, , Disp: , Rfl:     pregabalin (LYRICA) 150 MG capsule, Take 2 capsules (300 mg total) by mouth once daily., Disp: 30 capsule, Rfl: 1    promethazine (PHENERGAN) 25 MG tablet, Take 1 tablet (25 mg total) by mouth every 8 (eight)  hours as needed for Nausea., Disp: 30 tablet, Rfl: 1    propranoloL (INDERAL) 20 MG tablet, , Disp: , Rfl:     topiramate (TOPAMAX) 100 MG tablet, Take 1 tablet by mouth 2 (two) times a day., Disp: , Rfl:     ziprasidone (GEODON) 60 MG Cap, Take 1 capsule (60 mg total) by mouth once daily., Disp: 90 capsule, Rfl: 1  ALLERGIES:   Review of patient's allergies indicates:   Allergen Reactions    Perflutren lipid microspheres Shortness Of Breath, Nausea Only, Rash and Other (See Comments)     Red faced, short of breath and nauseated.     Amoxicillin Rash and Other (See Comments)     REVIEW OF SYSTEMS:  Constitution: Negative. Negative for chills, fever and night sweats. HENT: Negative for congestion and headaches.  Eyes: Negative for blurred vision, left vision loss and right vision loss. Cardiovascular: Negative for chest pain and syncope. Respiratory: Negative for cough and shortness of breath.       PHYSICAL EXAM:  VITAL SIGNS: There were no vitals taken for this visit.  General: Well-developed well-nourished 53 y.o. femalein no acute distress;Cardiovascular: Regular rhythm by palpation of distal pulse, normal color and temperature, no concerning varicosities on symptomatic side Lungs: No labored breathing or wheezing appreciated Neuro: Alert and oriented ×3 Psychiatric: well oriented to person, place and time, demonstrates normal mood and affect Skin: No rashes, lesions or ulcers, normal temperature, turgor, and texture on uninvolved extremity    General    Nursing note and vitals reviewed.  Constitutional: She is oriented to person, place, and time. She appears well-developed and well-nourished. No distress.   HENT:   Head: Normocephalic and atraumatic.   Eyes: Pupils are equal, round, and reactive to light.   Neck: Neck supple.   Cardiovascular:  Normal rate and intact distal pulses.            Pulmonary/Chest: Breath sounds normal. No respiratory distress. She exhibits no tenderness.   Abdominal: Soft. Bowel  sounds are normal.   Neurological: She is alert and oriented to person, place, and time. She has normal reflexes. She displays normal reflexes. No cranial nerve deficit. She exhibits normal muscle tone.   Psychiatric: She has a normal mood and affect. Her behavior is normal. Judgment and thought content normal.         Right Shoulder Exam     Inspection/Observation   Swelling: absent  Bruising: absent    Tenderness   The patient is tender to palpation of the greater tuberosity, acromion and acromioclavicular joint.    Range of Motion   Active abduction:  abnormal   Passive abduction:  normal   Forward Flexion:  abnormal   Forward Elevation: abnormal  External Rotation 90 degrees: normal    Tests & Signs   Apprehension: negative  Cross arm: positive  Impingement: positive  Rotator Cuff Painful Arc/Range: mild  Belly Press: positive  Jerk Test: negative    Other   Sensation: normal    Comments:  Pain with dynamic labral shear test.  There is pain and weakness with Whipple testing.     Left Shoulder Exam   Left shoulder exam is normal.       Muscle Strength   Right Upper Extremity   Shoulder External Rotation: 4/5   Supraspinatus: 4/5   Subscapularis: 4/5   Biceps: 4/5     Reflexes     Right Side   Biceps:  2+  Right Abbott's Sign:  absent    Vascular Exam     Right Pulses      Radial:                    2+      Capillary Refill  Right Hand: normal capillary refill        IMAGING:  No results found.      ASSESSMENT:      ICD-10-CM ICD-9-CM   1. Right shoulder pain, unspecified chronicity  M25.511 719.41       PLAN:     -Findings and treatment options were discussed with the patient  -All questions answered  Right shoulder subacromial injection today.  Continue Mobic.  We did strongly suggest formal physical therapy for the right shoulder.  Patient would like to think about therapy.  Return to clinic in 3-6 months for recheck     There are no Patient Instructions on file for this visit.  No orders of the defined types  were placed in this encounter.    Large Joint Aspiration/Injection: R subacromial bursa    Date/Time: 5/15/2024 10:00 AM    Performed by: Shayy Wu FNP  Authorized by: Shayy Wu FNP    Consent Done?:  Yes (Verbal)  Indications:  Pain  Site marked: the procedure site was marked    Local anesthetic:  Bupivacaine 0.25% without epinephrine (4 cc's of 0.25% bupivicaine)    Details:  Needle Size:  22 G  Approach:  Posterior  Location:  Shoulder  Site:  R subacromial bursa  Medications:  80 mg triamcinolone acetonide 40 mg/mL  Patient tolerance:  Patient tolerated the procedure well with no immediate complications

## 2024-07-03 DIAGNOSIS — R09.81 CONGESTION OF NASAL SINUS: ICD-10-CM

## 2024-07-03 DIAGNOSIS — E87.6 HYPOKALEMIA: ICD-10-CM

## 2024-07-03 RX ORDER — MELOXICAM 15 MG/1
15 TABLET ORAL DAILY
Qty: 90 TABLET | Refills: 1 | Status: SHIPPED | OUTPATIENT
Start: 2024-07-03

## 2024-07-03 RX ORDER — FLUTICASONE PROPIONATE 50 MCG
2 SPRAY, SUSPENSION (ML) NASAL DAILY PRN
Qty: 16 G | Refills: 3 | Status: SHIPPED | OUTPATIENT
Start: 2024-07-03

## 2024-07-03 RX ORDER — POTASSIUM CHLORIDE 600 MG/1
8 TABLET, FILM COATED, EXTENDED RELEASE ORAL DAILY
Qty: 90 TABLET | Refills: 1 | Status: SHIPPED | OUTPATIENT
Start: 2024-07-03

## 2024-07-09 DIAGNOSIS — Z71.89 COMPLEX CARE COORDINATION: ICD-10-CM

## 2024-08-06 DIAGNOSIS — R11.0 NAUSEA: Chronic | ICD-10-CM

## 2024-08-06 DIAGNOSIS — T78.40XD ALLERGY, SUBSEQUENT ENCOUNTER: ICD-10-CM

## 2024-08-07 RX ORDER — CETIRIZINE HYDROCHLORIDE 10 MG/1
10 TABLET ORAL DAILY
Qty: 90 TABLET | Refills: 1 | Status: SHIPPED | OUTPATIENT
Start: 2024-08-07

## 2024-08-07 RX ORDER — PROMETHAZINE HYDROCHLORIDE 25 MG/1
25 TABLET ORAL EVERY 8 HOURS PRN
Qty: 30 TABLET | Refills: 1 | Status: SHIPPED | OUTPATIENT
Start: 2024-08-07

## 2024-08-12 PROBLEM — Z13.1 SCREENING FOR DIABETES MELLITUS (DM): Status: RESOLVED | Noted: 2024-01-24 | Resolved: 2024-08-12

## 2024-09-04 ENCOUNTER — OFFICE VISIT (OUTPATIENT)
Dept: FAMILY MEDICINE | Facility: CLINIC | Age: 54
End: 2024-09-04
Payer: MEDICARE

## 2024-09-04 VITALS
WEIGHT: 240 LBS | HEART RATE: 103 BPM | OXYGEN SATURATION: 97 % | HEIGHT: 69 IN | SYSTOLIC BLOOD PRESSURE: 144 MMHG | RESPIRATION RATE: 18 BRPM | TEMPERATURE: 98 F | BODY MASS INDEX: 35.55 KG/M2 | DIASTOLIC BLOOD PRESSURE: 84 MMHG

## 2024-09-04 DIAGNOSIS — K59.00 CONSTIPATION, UNSPECIFIED CONSTIPATION TYPE: Primary | ICD-10-CM

## 2024-09-04 DIAGNOSIS — R11.0 NAUSEA: Chronic | ICD-10-CM

## 2024-09-04 DIAGNOSIS — M62.838 MUSCLE SPASM: ICD-10-CM

## 2024-09-04 PROCEDURE — 99214 OFFICE O/P EST MOD 30 MIN: CPT | Mod: ,,, | Performed by: INTERNAL MEDICINE

## 2024-09-04 RX ORDER — PROMETHAZINE HYDROCHLORIDE 25 MG/1
25 TABLET ORAL EVERY 8 HOURS PRN
Qty: 30 TABLET | Refills: 1 | Status: SHIPPED | OUTPATIENT
Start: 2024-09-04

## 2024-09-04 RX ORDER — BACLOFEN 10 MG/1
10 TABLET ORAL 3 TIMES DAILY
Qty: 180 TABLET | Refills: 1 | Status: SHIPPED | OUTPATIENT
Start: 2024-09-04

## 2024-09-11 NOTE — PROGRESS NOTES
Subjective:       Patient ID: Shayy Hunter is a 53 y.o. female.    Chief Complaint: Follow-up (4 month fu ) and Health Maintenance    HPI  .  Patient presents with chronic constipation chronic muscle spasms and also chronic nausea which has been getting worse patient stated he has been order to have bowel movements.    Current Medications:    Current Outpatient Medications:     cetirizine (ZYRTEC) 10 MG tablet, Take 1 tablet (10 mg total) by mouth once daily., Disp: 90 tablet, Rfl: 1    citalopram (CELEXA) 10 MG tablet, Take 1 tablet (10 mg total) by mouth once daily., Disp: 30 tablet, Rfl: 1    docusate sodium (COLACE) 100 MG capsule, Take 1 capsule (100 mg total) by mouth 2 (two) times daily., Disp: 60 capsule, Rfl: 6    fluticasone propionate (FLONASE) 50 mcg/actuation nasal spray, 2 sprays (100 mcg total) by Each Nostril route daily as needed for Rhinitis., Disp: 16 g, Rfl: 3    furosemide (LASIX) 20 MG tablet, Take 1 tablet (20 mg total) by mouth once daily., Disp: 90 tablet, Rfl: 1    gabapentin (NEURONTIN) 300 MG capsule, 1 capsule before bedtime Orally Twice a day, Disp: , Rfl:     guaiFENesin (MUCINEX) 600 mg 12 hr tablet, Take 1 tablet (600 mg total) by mouth 2 (two) times daily., Disp: 20 tablet, Rfl: 0    hydrOXYzine (ATARAX) 50 MG tablet, 1 tablet as needed Orally every 6 hrs, Disp: , Rfl:     lactulose (CHRONULAC) 10 gram/15 mL solution, Take 15 mLs (10 g total) by mouth 2 (two) times daily as needed (constipation)., Disp: 237 mL, Rfl: 0    lamoTRIgine (LAMICTAL) 200 MG tablet, , Disp: , Rfl:     meloxicam (MOBIC) 15 MG tablet, Take 1 tablet (15 mg total) by mouth once daily., Disp: 90 tablet, Rfl: 1    mirtazapine (REMERON) 15 MG tablet, Take 1 tablet (15 mg total) by mouth every evening., Disp: 90 tablet, Rfl: 1    mupirocin (BACTROBAN) 2 % ointment, Apply topically 2 (two) times daily. Apply to affected area., Disp: 1 g, Rfl: 3    omeprazole (PRILOSEC) 40 MG capsule, Take 1 capsule (40 mg total) by  "mouth 2 (two) times daily before meals., Disp: 180 capsule, Rfl: 3    oxycodone-acetaminophen  mg (PERCOCET)  mg per tablet, Take 1 tablet by mouth daily as needed., Disp: , Rfl:     potassium chloride (KLOR-CON) 8 MEQ TbSR, Take 1 tablet (8 mEq total) by mouth once daily., Disp: 90 tablet, Rfl: 1    prazosin (MINIPRESS) 1 MG Cap, , Disp: , Rfl:     pregabalin (LYRICA) 150 MG capsule, Take 2 capsules (300 mg total) by mouth once daily., Disp: 30 capsule, Rfl: 1    propranoloL (INDERAL) 20 MG tablet, , Disp: , Rfl:     topiramate (TOPAMAX) 100 MG tablet, Take 1 tablet by mouth 2 (two) times a day., Disp: , Rfl:     ziprasidone (GEODON) 60 MG Cap, Take 1 capsule (60 mg total) by mouth once daily., Disp: 90 capsule, Rfl: 1    baclofen (LIORESAL) 10 MG tablet, Take 1 tablet (10 mg total) by mouth 3 (three) times daily., Disp: 180 tablet, Rfl: 1    promethazine (PHENERGAN) 25 MG tablet, Take 1 tablet (25 mg total) by mouth every 8 (eight) hours as needed for Nausea., Disp: 30 tablet, Rfl: 1           ROS  Twelve point system reviewed, unremarkable except for stated above in HPI.        Objective:         Vitals:    09/04/24 1342   BP: (!) 144/84   BP Location: Right arm   Patient Position: Sitting   BP Method: Large (Automatic)   Pulse: 103   Resp: 18   Temp: 97.8 °F (36.6 °C)   TempSrc: Tympanic   SpO2: 97%   Weight: 108.9 kg (240 lb)   Height: 5' 9" (1.753 m)        Physical Exam     Patient is awake alert oriented person place and  Lungs are clear to auscultation bilaterally no crackles or wheezes   Cardiovascular S1-S2 regular rate and rhythm no murmurs rubs or gallops   Abdomen is soft positive bowel sounds nontender, extremities no clubbing cyanosis edema  Neuro no focal neurological deficits  Skin warm and dry.     Last Labs:     No visits with results within 1 Month(s) from this visit.   Latest known visit with results is:   Office Visit on 05/08/2024   Component Date Value    Hemoglobin A1C " 05/08/2024 5.5     Estimated Average Glucose 05/08/2024 111     Triglycerides 05/08/2024 79     Cholesterol 05/08/2024 174     HDL Cholesterol 05/08/2024 54     Cholesterol/HDL Ratio (R* 05/08/2024 3.2     Non-HDL 05/08/2024 120     LDL Calculated 05/08/2024 104     LDL/HDL 05/08/2024 1.9     VLDL 05/08/2024 16        Last Imaging:  X-Ray Cervical Spine 2 or 3 Views  Narrative: EXAMINATION:  XR CERVICAL SPINE 2 OR 3 VIEWS    CLINICAL HISTORY:  Pain in right shoulder    COMPARISON:  Cervical spine x-ray September 26, 2023    TECHNIQUE:  Frontal, lateral, and open-mouth odontoid views of the cervical spine.    FINDINGS:  Anterior fusion hardware again noted C4 through C7.  Osseous fusion C3 through C6.  Straightening of normal cervical lordosis.  Similar overall appearance to prior.  Impression: As above.    Point of Service: [facility]    Electronically signed by: Tyrone Asher  Date:    03/05/2024  Time:    10:28  X-ray Shoulder 2 or More Views Right  Narrative: EXAMINATION:  XR SHOULDER COMPLETE 2 OR MORE VIEWS RIGHT    CLINICAL HISTORY:  Pain in right shoulder    COMPARISON:  3 October 2013    TECHNIQUE:  XR SHOULDER 2 VIEWS RIGHT    FINDINGS:  No evidence of fracture seen.  The alignment of the joints appears normal.  Mild shoulder degenerative change is present.  No soft tissue abnormality is seen.  Impression: Mild shoulder osteoarthrosis.    Electronically signed by: Sha Rios  Date:    03/05/2024  Time:    10:24         **Labs and x-rays personally reviewed by me    ** reviewed           Assessment & Plan:       1. Constipation, unspecified constipation type  MiraLax 1 pack a day   High-fiber diet   Increase water intake.  2. Muscle spasm  -     baclofen (LIORESAL) 10 MG tablet; Take 1 tablet (10 mg total) by mouth 3 (three) times daily.  Dispense: 180 tablet; Refill: 1    3. Nausea  -     promethazine (PHENERGAN) 25 MG tablet; Take 1 tablet (25 mg total) by mouth every 8 (eight) hours  as needed for Nausea.  Dispense: 30 tablet; Refill: 1            Juno Brady MD

## 2024-09-23 ENCOUNTER — OFFICE VISIT (OUTPATIENT)
Dept: GASTROENTEROLOGY | Facility: CLINIC | Age: 54
End: 2024-09-23
Payer: MEDICARE

## 2024-09-23 VITALS
DIASTOLIC BLOOD PRESSURE: 92 MMHG | WEIGHT: 238 LBS | SYSTOLIC BLOOD PRESSURE: 149 MMHG | HEART RATE: 84 BPM | HEIGHT: 69 IN | BODY MASS INDEX: 35.25 KG/M2

## 2024-09-23 DIAGNOSIS — K59.04 CHRONIC IDIOPATHIC CONSTIPATION: Primary | ICD-10-CM

## 2024-09-23 DIAGNOSIS — R13.10 DYSPHAGIA, UNSPECIFIED TYPE: ICD-10-CM

## 2024-09-23 DIAGNOSIS — R10.13 EPIGASTRIC PAIN: ICD-10-CM

## 2024-09-23 PROCEDURE — 99215 OFFICE O/P EST HI 40 MIN: CPT | Mod: PBBFAC

## 2024-09-23 PROCEDURE — 99214 OFFICE O/P EST MOD 30 MIN: CPT | Mod: S$PBB,,,

## 2024-09-23 PROCEDURE — 99999 PR PBB SHADOW E&M-EST. PATIENT-LVL V: CPT | Mod: PBBFAC,,,

## 2024-10-02 ENCOUNTER — HOSPITAL ENCOUNTER (OUTPATIENT)
Dept: RADIOLOGY | Facility: HOSPITAL | Age: 54
Discharge: HOME | End: 2024-10-02
Payer: MEDICARE

## 2024-10-02 DIAGNOSIS — R10.13 EPIGASTRIC PAIN: ICD-10-CM

## 2024-10-02 PROCEDURE — 78264 GASTRIC EMPTYING IMG STUDY: CPT | Mod: 26,,, | Performed by: RADIOLOGY

## 2024-10-02 PROCEDURE — A9541 TC99M SULFUR COLLOID: HCPCS

## 2024-10-02 PROCEDURE — 78264 GASTRIC EMPTYING IMG STUDY: CPT | Mod: TC

## 2024-10-02 RX ORDER — TECHNETIUM TC 99M SULFUR COLLOID 2 MG
500 KIT MISCELLANEOUS
Status: COMPLETED | OUTPATIENT
Start: 2024-10-02 | End: 2024-10-02

## 2024-10-02 RX ADMIN — TECHNETIUM TC 99M SULFUR COLLOID KIT 500 MILLICURIE: KIT at 09:10

## 2024-10-07 DIAGNOSIS — R60.9 EDEMA, UNSPECIFIED TYPE: ICD-10-CM

## 2024-10-07 RX ORDER — FUROSEMIDE 20 MG/1
20 TABLET ORAL DAILY
Qty: 90 TABLET | Refills: 1 | Status: SHIPPED | OUTPATIENT
Start: 2024-10-07

## 2024-10-07 NOTE — PROGRESS NOTES
Gastroenterology Clinic Note    Patient ID: [id number]   Referring MD: No ref. provider found   Chief Complaint:   Chief Complaint   Patient presents with    Follow-up     6 month    Abdominal Pain    Abdominal Cramping    Bloated    Dysphagia    Nausea    Emesis    Irritable Bowel Syndrome       History of Present Illness   Shayy Hunter is an 53 y.o. WF who is referred for GERD symptoms. Patient reports daily symptoms of epigastric pain/burning. Symptoms will wake her from sleep at night. Also experiencing recurrent nausea and vomiting. She has tried multiple therapies in the past including omeprazole and pantoprazole along with OTC therapies. None of these provided her with relief of symptoms. She reports taking Dexilant for many years now; this has controlled her symptoms well until she ran out and medication was no longer covered by her insurance. She is requesting refill today. She complains of dysphagia. Required dilation in 2017 with Dr. Crespo that improved symptoms until they returned over the past several months. She reports alternating constipation and diarrhea throughout her life. Was seen by GI in 2021 for diarrhea; now reports she is experiencing just constipation. Denies any hematochezia or melena. She has tried OTC laxatives and stool softeners without relief; also tried Movantik but this caused a lot of stomach cramping. Reports lactulose has helped with constipation in the past. She had colonoscopy in 2008; denies any FMH of CRC or IBD.       Previous workup:EGD    Last colonoscopy was 01/24/2024 with 5 yr recall.    Interval   - epigastric pain and early satiety persist; patient was unable to have GE study as previously scheduled, asking to be re-scheduled  - dysphagia has returned with solid foods and medications; benefited from dilation in the past   - constipation is not improved with OTC therapies     Review of Systems   Constitutional:  Negative for weight loss.   Gastrointestinal:  Positive for  abdominal pain, constipation, heartburn and nausea. Negative for blood in stool, diarrhea and melena.       Past Medical History      Past Medical History:   Diagnosis Date    Depression 2012    GERD (gastroesophageal reflux disease)     Herniation of intervertebral disc between L4 and L5     L4, L5, S1    LBP (low back pain) 2012    Sciatica        Past Surgical History     Past Surgical History:   Procedure Laterality Date    ANTERIOR CERVICAL DISCECTOMY W/ FUSION  2016    BACK SURGERY      back surgery  2015    CHOLECYSTECTOMY  2012    TONSILLECTOMY         Allergies     Review of patient's allergies indicates:   Allergen Reactions    Perflutren lipid microspheres Shortness Of Breath, Nausea Only, Rash and Other (See Comments)     Red faced, short of breath and nauseated.     Amoxicillin Rash and Other (See Comments)       Immunization History     Immunization History   Administered Date(s) Administered    COVID-19, MRNA, LN-S, PF (MODERNA FULL 0.5 ML DOSE) 2021, 08/10/2021    DTP 1973, 1973, 1973, 1974, 1977    IPV 1973, 1973, 1973, 1974, 1977    Influenza - Quadrivalent - PF *Preferred* (6 months and older) 2013, 10/24/2019, 2020, 10/27/2021, 2023, 2024    Influenza Split 2013    Measles / Rubella 1973    Pneumococcal Conjugate - 20 Valent 2023    Td (ADULT) 1995       Past Family History      Family History   Problem Relation Name Age of Onset    Hypertension Mother      Diabetes Mother      Neuropathy Father         Past Social History      Social History     Socioeconomic History    Marital status: Single   Tobacco Use    Smoking status: Former     Current packs/day: 0.00     Average packs/day: 0.1 packs/day for 2.0 years (0.3 ttl pk-yrs)     Types: Cigarettes     Start date: 2009     Quit date: 2011     Years since quittin.6    Smokeless tobacco: Never     Tobacco comments:     quit 1 1/2 years ago   Substance and Sexual Activity    Alcohol use: Not Currently    Drug use: Never       Current Medications     Outpatient Medications Marked as Taking for the 9/23/24 encounter (Office Visit) with Tri Head FNP   Medication Sig Dispense Refill    baclofen (LIORESAL) 10 MG tablet Take 1 tablet (10 mg total) by mouth 3 (three) times daily. 180 tablet 1    cetirizine (ZYRTEC) 10 MG tablet Take 1 tablet (10 mg total) by mouth once daily. 90 tablet 1    citalopram (CELEXA) 10 MG tablet Take 1 tablet (10 mg total) by mouth once daily. 30 tablet 1    docusate sodium (COLACE) 100 MG capsule Take 1 capsule (100 mg total) by mouth 2 (two) times daily. 60 capsule 6    fluticasone propionate (FLONASE) 50 mcg/actuation nasal spray 2 sprays (100 mcg total) by Each Nostril route daily as needed for Rhinitis. 16 g 3    furosemide (LASIX) 20 MG tablet Take 1 tablet (20 mg total) by mouth once daily. 90 tablet 1    gabapentin (NEURONTIN) 300 MG capsule 1 capsule before bedtime Orally Twice a day      guaiFENesin (MUCINEX) 600 mg 12 hr tablet Take 1 tablet (600 mg total) by mouth 2 (two) times daily. 20 tablet 0    hydrOXYzine (ATARAX) 50 MG tablet 1 tablet as needed Orally every 6 hrs      lactulose (CHRONULAC) 10 gram/15 mL solution Take 15 mLs (10 g total) by mouth 2 (two) times daily as needed (constipation). 237 mL 0    lamoTRIgine (LAMICTAL) 200 MG tablet       meloxicam (MOBIC) 15 MG tablet Take 1 tablet (15 mg total) by mouth once daily. 90 tablet 1    mirtazapine (REMERON) 15 MG tablet Take 1 tablet (15 mg total) by mouth every evening. 90 tablet 1    mupirocin (BACTROBAN) 2 % ointment Apply topically 2 (two) times daily. Apply to affected area. 1 g 3    omeprazole (PRILOSEC) 40 MG capsule Take 1 capsule (40 mg total) by mouth 2 (two) times daily before meals. 180 capsule 3    oxycodone-acetaminophen  mg (PERCOCET)  mg per tablet Take 1 tablet by mouth daily as  "needed.      potassium chloride (KLOR-CON) 8 MEQ TbSR Take 1 tablet (8 mEq total) by mouth once daily. 90 tablet 1    prazosin (MINIPRESS) 1 MG Cap       pregabalin (LYRICA) 150 MG capsule Take 2 capsules (300 mg total) by mouth once daily. 30 capsule 1    promethazine (PHENERGAN) 25 MG tablet Take 1 tablet (25 mg total) by mouth every 8 (eight) hours as needed for Nausea. 30 tablet 1    propranoloL (INDERAL) 20 MG tablet       topiramate (TOPAMAX) 100 MG tablet Take 1 tablet by mouth 2 (two) times a day.      ziprasidone (GEODON) 60 MG Cap Take 1 capsule (60 mg total) by mouth once daily. 90 capsule 1        I have reviewed the current medications, allergies, vital signs, past medical and surgical history, family medical history, and social history for this encounter and agree with all findings.    OBJECTIVE    Physical Exam    BP (!) 149/92   Pulse 84   Ht 5' 9" (1.753 m)   Wt 108 kg (238 lb)   BMI 35.15 kg/m²   GEN: Well appearing, cooperative, NAD, Obese  NECK: Supple, no LAD  CV: Normal rate  RESP: Unlabored  ABD: ND,  no guarding  EXT: No clubbing, cyanosis, or edema  SKIN: Warm and dry  NEURO: AAO x4.     LABS    CBC (with or without Differential):   Lab Results   Component Value Date    WBC 5.37 09/12/2023    WBC 6.41 10/18/2012    HGB 14.6 09/12/2023    HGB 13.6 10/18/2012    HCT 45.1 09/12/2023    HCT 41.0 10/18/2012    MCV 89.5 09/12/2023    MCV 85.1 10/18/2012    MCH 29.0 09/12/2023    MCH 28.2 10/18/2012    MCHC 32.4 09/12/2023    MCHC 33.2 10/18/2012    RDW 13.3 09/12/2023    RDW 13.3 10/18/2012     09/12/2023     (H) 10/18/2012    MPV 9.5 09/12/2023    MPV 8.9 (L) 10/18/2012    NEUTOPHILPCT 59.0 09/12/2023    DIFFTYPE Auto 09/12/2023     BMP/CMP:   Lab Results   Component Value Date     09/12/2023     10/18/2012    K 4.2 09/12/2023    K 3.9 10/18/2012     09/12/2023     10/18/2012    CO2 28 09/12/2023    CO2 22 (L) 10/18/2012    BUN 9 09/12/2023    BUN 5 (L) " 10/18/2012    CREATININE 0.91 09/12/2023    CREATININE 0.7 10/18/2012     09/12/2023    GLU 97 10/18/2012    CALCIUM 9.0 09/12/2023    CALCIUM 9.0 10/18/2012    ALBUMIN 3.8 10/18/2012    AST 18 10/18/2012    ALT 15 10/18/2012    ALKPHOS 95 10/18/2012        IMAGING  No pertinent imaging.    ASSESSMENT  Shayy Hunter is a 53 y.o. WF with PMH of GERD and depression who is referred for follow-up.    1. Chronic idiopathic constipation    2. Dysphagia, unspecified type    3. Epigastric pain           PLAN    - reschedule GE study to rule out gastroparesis   - Linzess 145 mcg daily for chronic constipation   - schedule EGD, discussed procedure with patient and possible esophageal dilation may be performed during procedure if indicated, patient verbalized understanding  - possible UGI/esophagram/esophageal manometry if symptoms persist  - repeat liver us for surveillance of fatty liver at follow-up    No orders of the defined types were placed in this encounter.        The risks and benefits of my recommendations, as well as other treatment options were discussed with the patient today. All questions were answered.    35 minutes of total time spent on the encounter, which includes face to face time and non-face to face time preparing to see the patient (eg, review of tests), obtaining and/or reviewing separately obtained history, documenting clinical information in the electronic or other health record, Independently interpreting results (not separately reported) and communicating results to the patient/family/caregiver, or care coordination (not separately reported).        Tri Head, KOLE/ACNP [facility]

## 2024-10-08 ENCOUNTER — TELEPHONE (OUTPATIENT)
Dept: GASTROENTEROLOGY | Facility: CLINIC | Age: 54
End: 2024-10-08
Payer: MEDICARE

## 2024-10-08 DIAGNOSIS — K59.04 CHRONIC IDIOPATHIC CONSTIPATION: Primary | ICD-10-CM

## 2024-10-08 RX ORDER — LUBIPROSTONE 8 UG/1
8 CAPSULE ORAL 2 TIMES DAILY
Qty: 180 CAPSULE | Refills: 3 | Status: SHIPPED | OUTPATIENT
Start: 2024-10-08 | End: 2025-10-08

## 2024-10-08 NOTE — TELEPHONE ENCOUNTER
"----- Message from KOLE Ying sent at 10/8/2024 11:24 AM CDT -----  I sent Skip  ----- Message -----  From: Bobbi Coreas LPN  Sent: 10/8/2024   9:06 AM CDT  To: KOLE Ying    Patient aware of results and stated that the Linzess is too expensive and wanted to know if there was an alternative.  ----- Message -----  From: Tri Head FNP  Sent: 10/7/2024   3:30 PM CDT  To: Adilene Bartlett Staff    Please call to inform & review the results with the patient - let the patient know per radiology report her gastric emptying study showed delayed/slow gastric emptying. Gastroparesis is a condition that causes nausea and vomiting. It can also make you feel full too soon after you start eating. It happens because the stomach takes too long to empty and does not move food along through your body fast enough. Gastroparesis is also called "delayed gastric emptying." I recommend small frequent meals instead of 3 big meals a day, low fat meals & low residue diet. Avoid: high fiber (insoluble fiber), red meats, dairy, and non-digestible solids (peels, fruit pulp, etc). Avoid NSAIDs and narcotics. Continue with previous recommendations. If no improvement in symptoms or symptoms worsen, call/follow-up at clinic or go to ER. Please call with any questions/concerns.    Thank you,  KOLE Ying/SMITHP  "

## 2024-10-08 NOTE — TELEPHONE ENCOUNTER
"----- Message from KOLE Ying sent at 10/7/2024  3:30 PM CDT -----  Please call to inform & review the results with the patient - let the patient know per radiology report her gastric emptying study showed delayed/slow gastric emptying. Gastroparesis is a condition that causes nausea and vomiting. It can also make you feel full too soon after you start eating. It happens because the stomach takes too long to empty and does not move food along through your body fast enough. Gastroparesis is also called "delayed gastric emptying." I recommend small frequent meals instead of 3 big meals a day, low fat meals & low residue diet. Avoid: high fiber (insoluble fiber), red meats, dairy, and non-digestible solids (peels, fruit pulp, etc). Avoid NSAIDs and narcotics. Continue with previous recommendations. If no improvement in symptoms or symptoms worsen, call/follow-up at clinic or go to ER. Please call with any questions/concerns.    Thank you,  KOLE Ying/SMITHP   "

## 2024-10-09 ENCOUNTER — ANESTHESIA EVENT (OUTPATIENT)
Dept: GASTROENTEROLOGY | Facility: HOSPITAL | Age: 54
End: 2024-10-09
Payer: MEDICARE

## 2024-10-09 ENCOUNTER — ANESTHESIA (OUTPATIENT)
Dept: GASTROENTEROLOGY | Facility: HOSPITAL | Age: 54
End: 2024-10-09
Payer: MEDICARE

## 2024-10-09 ENCOUNTER — HOSPITAL ENCOUNTER (OUTPATIENT)
Dept: GASTROENTEROLOGY | Facility: HOSPITAL | Age: 54
Discharge: HOME | End: 2024-10-09
Admitting: INTERNAL MEDICINE
Payer: MEDICARE

## 2024-10-09 VITALS
RESPIRATION RATE: 12 BRPM | HEIGHT: 69 IN | BODY MASS INDEX: 34.07 KG/M2 | HEART RATE: 67 BPM | SYSTOLIC BLOOD PRESSURE: 130 MMHG | DIASTOLIC BLOOD PRESSURE: 85 MMHG | TEMPERATURE: 98 F | OXYGEN SATURATION: 98 % | WEIGHT: 230 LBS

## 2024-10-09 DIAGNOSIS — R13.10 DYSPHAGIA, UNSPECIFIED TYPE: ICD-10-CM

## 2024-10-09 PROCEDURE — 27201423 OPTIME MED/SURG SUP & DEVICES STERILE SUPPLY

## 2024-10-09 PROCEDURE — 37000008 HC ANESTHESIA 1ST 15 MINUTES

## 2024-10-09 PROCEDURE — 27000716 HC OXISENSOR PROBE, ANY SIZE: Performed by: NURSE ANESTHETIST, CERTIFIED REGISTERED

## 2024-10-09 PROCEDURE — 88305 TISSUE EXAM BY PATHOLOGIST: CPT | Mod: TC,SUR | Performed by: INTERNAL MEDICINE

## 2024-10-09 PROCEDURE — 63600175 PHARM REV CODE 636 W HCPCS: Performed by: NURSE ANESTHETIST, CERTIFIED REGISTERED

## 2024-10-09 PROCEDURE — 27000284 HC CANNULA NASAL: Performed by: NURSE ANESTHETIST, CERTIFIED REGISTERED

## 2024-10-09 RX ORDER — LIDOCAINE HYDROCHLORIDE 20 MG/ML
INJECTION, SOLUTION EPIDURAL; INFILTRATION; INTRACAUDAL; PERINEURAL
Status: DISCONTINUED | OUTPATIENT
Start: 2024-10-09 | End: 2024-10-09

## 2024-10-09 RX ORDER — SODIUM CHLORIDE 0.9 % (FLUSH) 0.9 %
10 SYRINGE (ML) INJECTION
Status: DISCONTINUED | OUTPATIENT
Start: 2024-10-09 | End: 2024-10-10 | Stop reason: HOSPADM

## 2024-10-09 RX ORDER — PROPOFOL 10 MG/ML
VIAL (ML) INTRAVENOUS
Status: DISCONTINUED | OUTPATIENT
Start: 2024-10-09 | End: 2024-10-09

## 2024-10-09 RX ADMIN — PROPOFOL 100 MG: 10 INJECTION, EMULSION INTRAVENOUS at 02:10

## 2024-10-09 RX ADMIN — LIDOCAINE HYDROCHLORIDE 100 MG: 20 INJECTION, SOLUTION INTRAVENOUS at 02:10

## 2024-10-09 NOTE — DISCHARGE INSTRUCTIONS
Procedure Date  10/9/24     Impression  Overall Impression:   The upper third of the esophagus, middle third of the esophagus, lower third of the esophagus, Z-line, cardia, fundus of the stomach, body of the stomach, incisura, antrum, duodenal bulb, 1st part of the duodenum and 2nd part of the duodenum appeared normal. Performed random biopsy to rule out eosinophilic esophagitis.  Dilated in the esophagus with Savary-Rashad dilator to 54 Fr ending size. Dilation caused improved passage of the scope        Recommendation  Await pathology results  If no improvement with dilation, consider manometry at follow up  Patient with recently diagnosed gastroparesis and prn opioid use for chronic pain - opioids can cause dysmotility and may be contributing to dysphagia and gastroparesis      Follow up in GI clinic as scheduled or sooner PRN - JANUARY 29, 2024 @ 2:00 PM      Outcome of procedure: successful EGD  Disposition: patient to recovery following procedure; discharge to home when appropriate parameters met  Provisions for follow up: please call my office for any unexpected symptoms like chest or abdominal pain or bleeding following your procedure.  Final Diagnosis: dysphagia     No driving today, no operating heavy machinery, no signing any legal documents until tomorrow.    Drink lots of fluids, resume regular diet.  Take your normal medications.     Please call our office for any nausea, vomiting or abdominal pain.

## 2024-10-09 NOTE — TRANSFER OF CARE
"Anesthesia Transfer of Care Note    Patient: Shayy Hunter    Procedure(s) Performed: * No procedures listed *    Patient location: GI    Anesthesia Type: MAC    Transport from OR: Transported from OR on room air with adequate spontaneous ventilation. Continuous ECG monitoring in transport. Continuous SpO2 monitoring in transport    Post pain: adequate analgesia    Post assessment: no apparent anesthetic complications    Post vital signs: stable    Level of consciousness: sedated and responds to stimulation    Nausea/Vomiting: no nausea/vomiting    Complications: none    Transfer of care protocol was followedComments: Good SV continue, NAD, VSS, RTRN      Last vitals: Visit Vitals  /81 (BP Location: Left arm, Patient Position: Lying)   Pulse 82   Temp 36.5 °C (97.7 °F) (Oral)   Resp 13   Ht 5' 9" (1.753 m)   Wt 104.3 kg (230 lb)   SpO2 95%   Breastfeeding No   BMI 33.97 kg/m²     "

## 2024-10-09 NOTE — ANESTHESIA POSTPROCEDURE EVALUATION
Anesthesia Post Evaluation    Patient: Shayy Hunter    Procedure(s) Performed: EGD with dilation    Final Anesthesia Type: MAC      Patient location during evaluation: GI PACU  Patient participation: Yes- Able to Participate  Level of consciousness: awake and alert  Post-procedure vital signs: reviewed and stable  Pain management: adequate  Airway patency: patent    PONV status at discharge: No PONV  Anesthetic complications: no      Cardiovascular status: blood pressure returned to baseline and hemodynamically stable  Respiratory status: spontaneous ventilation  Hydration status: euvolemic  Follow-up not needed.  Comments: Refer to nursing notes for pain/myla score upon discharge from recovery.              Vitals Value Taken Time   /85 10/09/24 1500   Temp 36.5 °C (97.7 °F) 10/09/24 1433   Pulse 71 10/09/24 1500   Resp 10 10/09/24 1500   SpO2 97 % 10/09/24 1500   Vitals shown include unfiled device data.      No case tracking events are documented in the log.      Pain/Myla Score: Myla Score: 9 (10/9/2024  2:41 PM)

## 2024-10-09 NOTE — H&P
Gastroenterology Pre-procedure H&P    History of Present Illness    Shayy Hunter is a 53 y.o. female that  has a past medical history of Depression (2012), GERD (gastroesophageal reflux disease), Herniation of intervertebral disc between L4 and L5, LBP (low back pain) (2012), and Sciatica.     Patient with GERD and dysphagia here for EGD      Past Medical History:   Diagnosis Date    Depression 2012    GERD (gastroesophageal reflux disease)     Herniation of intervertebral disc between L4 and L5     L4, L5, S1    LBP (low back pain) 2012    Sciatica        Past Surgical History:   Procedure Laterality Date    ANTERIOR CERVICAL DISCECTOMY W/ FUSION  2016    BACK SURGERY      back surgery      CHOLECYSTECTOMY  2012    TONSILLECTOMY         Family History   Problem Relation Name Age of Onset    Hypertension Mother      Diabetes Mother      Neuropathy Father         Social History     Socioeconomic History    Marital status: Single   Tobacco Use    Smoking status: Former     Current packs/day: 0.00     Average packs/day: 0.1 packs/day for 2.0 years (0.3 ttl pk-yrs)     Types: Cigarettes     Start date: 2009     Quit date: 2011     Years since quittin.6    Smokeless tobacco: Never    Tobacco comments:     quit 1 1/2 years ago   Substance and Sexual Activity    Alcohol use: Not Currently    Drug use: Never       Current Outpatient Medications   Medication Sig Dispense Refill    baclofen (LIORESAL) 10 MG tablet Take 1 tablet (10 mg total) by mouth 3 (three) times daily. 180 tablet 1    cetirizine (ZYRTEC) 10 MG tablet Take 1 tablet (10 mg total) by mouth once daily. 90 tablet 1    citalopram (CELEXA) 10 MG tablet Take 1 tablet (10 mg total) by mouth once daily. 30 tablet 1    docusate sodium (COLACE) 100 MG capsule Take 1 capsule (100 mg total) by mouth 2 (two) times daily. 60 capsule 6    fluticasone propionate (FLONASE) 50 mcg/actuation nasal spray 2 sprays (100 mcg  total) by Each Nostril route daily as needed for Rhinitis. 16 g 3    furosemide (LASIX) 20 MG tablet Take 1 tablet (20 mg total) by mouth once daily. 90 tablet 1    gabapentin (NEURONTIN) 300 MG capsule 1 capsule before bedtime Orally Twice a day      guaiFENesin (MUCINEX) 600 mg 12 hr tablet Take 1 tablet (600 mg total) by mouth 2 (two) times daily. 20 tablet 0    hydrOXYzine (ATARAX) 50 MG tablet 1 tablet as needed Orally every 6 hrs      lactulose (CHRONULAC) 10 gram/15 mL solution Take 15 mLs (10 g total) by mouth 2 (two) times daily as needed (constipation). 237 mL 0    lamoTRIgine (LAMICTAL) 200 MG tablet       lubiprostone (AMITIZA) 8 MCG Cap Take 1 capsule (8 mcg total) by mouth 2 (two) times daily. 180 capsule 3    meloxicam (MOBIC) 15 MG tablet Take 1 tablet (15 mg total) by mouth once daily. 90 tablet 1    mirtazapine (REMERON) 15 MG tablet Take 1 tablet (15 mg total) by mouth every evening. 90 tablet 1    mupirocin (BACTROBAN) 2 % ointment Apply topically 2 (two) times daily. Apply to affected area. 1 g 3    omeprazole (PRILOSEC) 40 MG capsule Take 1 capsule (40 mg total) by mouth 2 (two) times daily before meals. 180 capsule 3    oxycodone-acetaminophen  mg (PERCOCET)  mg per tablet Take 1 tablet by mouth daily as needed.      potassium chloride (KLOR-CON) 8 MEQ TbSR Take 1 tablet (8 mEq total) by mouth once daily. 90 tablet 1    prazosin (MINIPRESS) 1 MG Cap       pregabalin (LYRICA) 150 MG capsule Take 2 capsules (300 mg total) by mouth once daily. 30 capsule 1    promethazine (PHENERGAN) 25 MG tablet Take 1 tablet (25 mg total) by mouth every 8 (eight) hours as needed for Nausea. 30 tablet 1    propranoloL (INDERAL) 20 MG tablet       topiramate (TOPAMAX) 100 MG tablet Take 1 tablet by mouth 2 (two) times a day.      ziprasidone (GEODON) 60 MG Cap Take 1 capsule (60 mg total) by mouth once daily. 90 capsule 1     No current facility-administered medications for this encounter.        Review of patient's allergies indicates:   Allergen Reactions    Perflutren lipid microspheres Shortness Of Breath, Nausea Only, Rash and Other (See Comments)     Red faced, short of breath and nauseated.     Amoxicillin Rash and Other (See Comments)       Objective:  There were no vitals filed for this visit.     GEN: normal appearing, NAD, AAO x3  HENT: NCAT, anicteric, OP benign  CV: normal rate, regular rhythm  RESP: CTA, symmetric rise, unlabored  ABD: soft, ND, no guarding or TTP  SKIN: warm and dry  NEURO: grossly afocal    Assessment and Plan:    Proceed with:    EGD for dysphagia       Luiz Hutchinson MD  Gastroenterology

## 2024-10-09 NOTE — ANESTHESIA PREPROCEDURE EVALUATION
10/09/2024  Shayy Hunter is a 53 y.o., female.      Pre-op Assessment    I have reviewed the Patient Summary Reports.     I have reviewed the Nursing Notes. I have reviewed the NPO Status.   I have reviewed the Medications.     Review of Systems  Anesthesia Hx:  No problems with previous Anesthesia                Social:  Former Smoker, No Alcohol Use       Hepatic/GI:     GERD, well controlled             Musculoskeletal:  Arthritis          Spine Disorders: lumbar and cervical            Endocrine:  Diabetes, well controlled, type 2         Obesity / BMI > 30  Psych:  Psychiatric History  depression                Physical Exam  General: Well nourished, Cooperative, Alert and Oriented    Airway:  Mallampati: III   Mouth Opening: Normal  TM Distance: < 4 cm  Tongue: Normal  Neck ROM: Normal ROM    Dental:  Intact        Anesthesia Plan  Type of Anesthesia, risks & benefits discussed:    Anesthesia Type: MAC  Intra-op Monitoring Plan: Standard ASA Monitors  Post Op Pain Control Plan: multimodal analgesia and IV/PO Opioids PRN  Induction:  IV  Informed Consent: Informed consent signed with the Patient and all parties understand the risks and agree with anesthesia plan.  All questions answered. Patient consented to blood products? Yes  ASA Score: 3  Day of Surgery Review of History & Physical: I have interviewed and examined the patient. I have reviewed the patient's H&P dated: There are no significant changes.     Ready For Surgery From Anesthesia Perspective.     Past Medical History:   Diagnosis Date    Depression 7/31/2012    GERD (gastroesophageal reflux disease)     Herniation of intervertebral disc between L4 and L5     L4, L5, S1    LBP (low back pain) 7/31/2012    Sciatica        Past Surgical History:   Procedure Laterality Date    ANTERIOR CERVICAL DISCECTOMY W/ FUSION  2016    BACK SURGERY  2012     back surgery  2015    CHOLECYSTECTOMY  2012    TONSILLECTOMY         Family History   Problem Relation Name Age of Onset    Hypertension Mother      Diabetes Mother      Neuropathy Father         Social History     Socioeconomic History    Marital status: Single   Tobacco Use    Smoking status: Former     Current packs/day: 0.00     Average packs/day: 0.1 packs/day for 2.0 years (0.3 ttl pk-yrs)     Types: Cigarettes     Start date: 2009     Quit date: 2011     Years since quittin.6    Smokeless tobacco: Never    Tobacco comments:     quit 1 1/2 years ago   Substance and Sexual Activity    Alcohol use: Not Currently    Drug use: Never       Current Outpatient Medications   Medication Sig Dispense Refill    baclofen (LIORESAL) 10 MG tablet Take 1 tablet (10 mg total) by mouth 3 (three) times daily. 180 tablet 1    cetirizine (ZYRTEC) 10 MG tablet Take 1 tablet (10 mg total) by mouth once daily. 90 tablet 1    citalopram (CELEXA) 10 MG tablet Take 1 tablet (10 mg total) by mouth once daily. 30 tablet 1    docusate sodium (COLACE) 100 MG capsule Take 1 capsule (100 mg total) by mouth 2 (two) times daily. 60 capsule 6    fluticasone propionate (FLONASE) 50 mcg/actuation nasal spray 2 sprays (100 mcg total) by Each Nostril route daily as needed for Rhinitis. 16 g 3    furosemide (LASIX) 20 MG tablet Take 1 tablet (20 mg total) by mouth once daily. 90 tablet 1    gabapentin (NEURONTIN) 300 MG capsule 1 capsule before bedtime Orally Twice a day      guaiFENesin (MUCINEX) 600 mg 12 hr tablet Take 1 tablet (600 mg total) by mouth 2 (two) times daily. 20 tablet 0    hydrOXYzine (ATARAX) 50 MG tablet 1 tablet as needed Orally every 6 hrs      lactulose (CHRONULAC) 10 gram/15 mL solution Take 15 mLs (10 g total) by mouth 2 (two) times daily as needed (constipation). 237 mL 0    lamoTRIgine (LAMICTAL) 200 MG tablet       lubiprostone (AMITIZA) 8 MCG Cap Take 1 capsule (8 mcg total) by mouth 2 (two) times  daily. 180 capsule 3    meloxicam (MOBIC) 15 MG tablet Take 1 tablet (15 mg total) by mouth once daily. 90 tablet 1    mirtazapine (REMERON) 15 MG tablet Take 1 tablet (15 mg total) by mouth every evening. 90 tablet 1    mupirocin (BACTROBAN) 2 % ointment Apply topically 2 (two) times daily. Apply to affected area. 1 g 3    omeprazole (PRILOSEC) 40 MG capsule Take 1 capsule (40 mg total) by mouth 2 (two) times daily before meals. 180 capsule 3    oxycodone-acetaminophen  mg (PERCOCET)  mg per tablet Take 1 tablet by mouth daily as needed.      potassium chloride (KLOR-CON) 8 MEQ TbSR Take 1 tablet (8 mEq total) by mouth once daily. 90 tablet 1    prazosin (MINIPRESS) 1 MG Cap       pregabalin (LYRICA) 150 MG capsule Take 2 capsules (300 mg total) by mouth once daily. 30 capsule 1    promethazine (PHENERGAN) 25 MG tablet Take 1 tablet (25 mg total) by mouth every 8 (eight) hours as needed for Nausea. 30 tablet 1    propranoloL (INDERAL) 20 MG tablet       topiramate (TOPAMAX) 100 MG tablet Take 1 tablet by mouth 2 (two) times a day.      ziprasidone (GEODON) 60 MG Cap Take 1 capsule (60 mg total) by mouth once daily. 90 capsule 1     No current facility-administered medications for this encounter.       Review of patient's allergies indicates:   Allergen Reactions    Perflutren lipid microspheres Shortness Of Breath, Nausea Only, Rash and Other (See Comments)     Red faced, short of breath and nauseated.     Amoxicillin Rash and Other (See Comments)      .

## 2024-10-18 ENCOUNTER — TELEPHONE (OUTPATIENT)
Dept: GASTROENTEROLOGY | Facility: CLINIC | Age: 54
End: 2024-10-18
Payer: MEDICARE

## 2024-10-18 NOTE — TELEPHONE ENCOUNTER
----- Message from KOLE Ying sent at 10/18/2024  9:10 AM CDT -----  Will someone please call and notify the patient that the results of her sucrose breath test she completed at home was normal. Thanks!

## 2024-10-18 NOTE — TELEPHONE ENCOUNTER
I attempted to contact pt regarding sucrose breath test results w/o success - VM left - pt advised to return if any inquiry arise

## 2024-11-05 DIAGNOSIS — R09.81 CONGESTION OF NASAL SINUS: ICD-10-CM

## 2024-11-05 RX ORDER — FLUTICASONE PROPIONATE 50 MCG
2 SPRAY, SUSPENSION (ML) NASAL DAILY PRN
Qty: 16 G | Refills: 3 | Status: SHIPPED | OUTPATIENT
Start: 2024-11-05

## 2024-11-05 RX ORDER — MUPIROCIN 20 MG/G
OINTMENT TOPICAL 2 TIMES DAILY
Qty: 1 G | Refills: 3 | Status: SHIPPED | OUTPATIENT
Start: 2024-11-05

## 2024-12-04 DIAGNOSIS — R11.0 NAUSEA: Chronic | ICD-10-CM

## 2024-12-09 RX ORDER — PROMETHAZINE HYDROCHLORIDE 25 MG/1
25 TABLET ORAL EVERY 8 HOURS PRN
Qty: 30 TABLET | Refills: 1 | Status: SHIPPED | OUTPATIENT
Start: 2024-12-09

## 2025-01-05 DIAGNOSIS — M62.838 MUSCLE SPASM: ICD-10-CM

## 2025-01-05 DIAGNOSIS — E87.6 HYPOKALEMIA: ICD-10-CM

## 2025-01-06 RX ORDER — BACLOFEN 10 MG/1
10 TABLET ORAL 3 TIMES DAILY
Qty: 180 TABLET | Refills: 1 | Status: SHIPPED | OUTPATIENT
Start: 2025-01-06

## 2025-01-06 RX ORDER — MELOXICAM 15 MG/1
15 TABLET ORAL DAILY
Qty: 90 TABLET | Refills: 1 | Status: SHIPPED | OUTPATIENT
Start: 2025-01-06

## 2025-01-06 RX ORDER — POTASSIUM CHLORIDE 600 MG/1
8 TABLET, FILM COATED, EXTENDED RELEASE ORAL DAILY
Qty: 90 TABLET | Refills: 1 | Status: SHIPPED | OUTPATIENT
Start: 2025-01-06

## 2025-01-14 ENCOUNTER — OFFICE VISIT (OUTPATIENT)
Dept: FAMILY MEDICINE | Facility: CLINIC | Age: 55
End: 2025-01-14
Payer: MEDICARE

## 2025-01-14 VITALS
HEART RATE: 101 BPM | SYSTOLIC BLOOD PRESSURE: 124 MMHG | DIASTOLIC BLOOD PRESSURE: 83 MMHG | BODY MASS INDEX: 36.88 KG/M2 | RESPIRATION RATE: 17 BRPM | HEIGHT: 69 IN | WEIGHT: 249 LBS | OXYGEN SATURATION: 98 % | TEMPERATURE: 98 F

## 2025-01-14 DIAGNOSIS — R05.9 COUGH, UNSPECIFIED TYPE: ICD-10-CM

## 2025-01-14 DIAGNOSIS — J40 BRONCHITIS: Primary | ICD-10-CM

## 2025-01-14 DIAGNOSIS — E11.9 DM (DIABETES MELLITUS): ICD-10-CM

## 2025-01-14 DIAGNOSIS — E66.812 CLASS 2 OBESITY WITH BODY MASS INDEX (BMI) OF 37.0 TO 37.9 IN ADULT, UNSPECIFIED OBESITY TYPE, UNSPECIFIED WHETHER SERIOUS COMORBIDITY PRESENT: ICD-10-CM

## 2025-01-14 DIAGNOSIS — M16.11 OSTEOARTHRITIS OF RIGHT HIP, UNSPECIFIED OSTEOARTHRITIS TYPE: ICD-10-CM

## 2025-01-14 LAB
CREAT UR-MCNC: 93 MG/DL (ref 15–325)
CTP QC/QA: YES
EST. AVERAGE GLUCOSE BLD GHB EST-MCNC: 114 MG/DL
HBA1C MFR BLD HPLC: 5.6 %
MICROALBUMIN UR-MCNC: <0.5 MG/DL
MICROALBUMIN/CREAT RATIO PNL UR: NORMAL
MOLECULAR STREP A: NEGATIVE
POC MOLECULAR INFLUENZA A AGN: NEGATIVE
POC MOLECULAR INFLUENZA B AGN: NEGATIVE
PROT UR-MCNC: 7.9 MG/DL
SARS-COV-2 RDRP RESP QL NAA+PROBE: NEGATIVE

## 2025-01-14 PROCEDURE — 99214 OFFICE O/P EST MOD 30 MIN: CPT | Mod: ,,, | Performed by: INTERNAL MEDICINE

## 2025-01-14 PROCEDURE — 82043 UR ALBUMIN QUANTITATIVE: CPT | Mod: ,,, | Performed by: CLINICAL MEDICAL LABORATORY

## 2025-01-14 PROCEDURE — 82570 ASSAY OF URINE CREATININE: CPT | Mod: ,,, | Performed by: CLINICAL MEDICAL LABORATORY

## 2025-01-14 PROCEDURE — 87635 SARS-COV-2 COVID-19 AMP PRB: CPT | Mod: RHCUB | Performed by: INTERNAL MEDICINE

## 2025-01-14 PROCEDURE — 87651 STREP A DNA AMP PROBE: CPT | Mod: RHCUB | Performed by: INTERNAL MEDICINE

## 2025-01-14 PROCEDURE — 87502 INFLUENZA DNA AMP PROBE: CPT | Mod: RHCUB | Performed by: INTERNAL MEDICINE

## 2025-01-14 PROCEDURE — 96372 THER/PROPH/DIAG INJ SC/IM: CPT | Mod: ,,, | Performed by: INTERNAL MEDICINE

## 2025-01-14 PROCEDURE — 83036 HEMOGLOBIN GLYCOSYLATED A1C: CPT | Mod: ,,, | Performed by: CLINICAL MEDICAL LABORATORY

## 2025-01-14 PROCEDURE — 84156 ASSAY OF PROTEIN URINE: CPT | Mod: ,,, | Performed by: CLINICAL MEDICAL LABORATORY

## 2025-01-14 RX ORDER — CLINDAMYCIN PHOSPHATE 10 UG/ML
LOTION TOPICAL
COMMUNITY
Start: 2025-01-06

## 2025-01-14 RX ORDER — BENZONATATE 200 MG/1
200 CAPSULE ORAL 2 TIMES DAILY
Qty: 30 CAPSULE | Refills: 0 | Status: SHIPPED | OUTPATIENT
Start: 2025-01-14

## 2025-01-14 RX ORDER — DEXAMETHASONE SODIUM PHOSPHATE 4 MG/ML
2 INJECTION, SOLUTION INTRA-ARTICULAR; INTRALESIONAL; INTRAMUSCULAR; INTRAVENOUS; SOFT TISSUE
Status: COMPLETED | OUTPATIENT
Start: 2025-01-14 | End: 2025-01-14

## 2025-01-14 RX ORDER — AZITHROMYCIN 250 MG/1
TABLET, FILM COATED ORAL
Qty: 6 TABLET | Refills: 0 | Status: SHIPPED | OUTPATIENT
Start: 2025-01-14 | End: 2025-01-19

## 2025-01-14 RX ORDER — ALPRAZOLAM 0.25 MG/1
0.25 TABLET ORAL
COMMUNITY
Start: 2025-01-06

## 2025-01-14 RX ADMIN — DEXAMETHASONE SODIUM PHOSPHATE 2 MG: 4 INJECTION, SOLUTION INTRA-ARTICULAR; INTRALESIONAL; INTRAMUSCULAR; INTRAVENOUS; SOFT TISSUE at 02:01

## 2025-01-16 NOTE — PROGRESS NOTES
Subjective:       Patient ID: Shayy Hunter is a 54 y.o. female.    Chief Complaint: Cough and Health Maintenance (Pt will have A1c and diabetes urine screeing done today )    History of Present Illness    CHIEF COMPLAINT:  Patient presents today with headache, nosebleed, and cough.    HEADACHE AND EPISTAXIS:  She reports severe frontal headache with associated epistaxis. She denies nausea, vomiting, or visual disturbances.    RESPIRATORY:  She reports cough and symptoms consistent with bronchitis. She is experiencing congestion.    CONSTITUTIONAL:  She reports fatigue and slept intermittently throughout the previous day.    MUSCULOSKELETAL:  She reports pain in the right middle finger.    MEDICAL HISTORY:  She has a history of multiple allergies.         Current Medications:    Current Outpatient Medications:     ALPRAZolam (XANAX) 0.25 MG tablet, Take 0.25 mg by mouth., Disp: , Rfl:     baclofen (LIORESAL) 10 MG tablet, Take 1 tablet (10 mg total) by mouth 3 (three) times daily., Disp: 180 tablet, Rfl: 1    cetirizine (ZYRTEC) 10 MG tablet, Take 1 tablet (10 mg total) by mouth once daily., Disp: 90 tablet, Rfl: 1    citalopram (CELEXA) 10 MG tablet, Take 1 tablet (10 mg total) by mouth once daily., Disp: 30 tablet, Rfl: 1    clindamycin (CLEOCIN T) 1 % lotion, Apply topically., Disp: , Rfl:     docusate sodium (COLACE) 100 MG capsule, Take 1 capsule (100 mg total) by mouth 2 (two) times daily., Disp: 60 capsule, Rfl: 6    fluticasone propionate (FLONASE) 50 mcg/actuation nasal spray, 2 sprays (100 mcg total) by Each Nostril route daily as needed for Rhinitis., Disp: 16 g, Rfl: 3    furosemide (LASIX) 20 MG tablet, Take 1 tablet (20 mg total) by mouth once daily., Disp: 90 tablet, Rfl: 1    gabapentin (NEURONTIN) 300 MG capsule, 1 capsule before bedtime Orally Twice a day, Disp: , Rfl:     guaiFENesin (MUCINEX) 600 mg 12 hr tablet, Take 1 tablet (600 mg total) by mouth 2 (two) times daily., Disp: 20 tablet, Rfl:  0    hydrOXYzine (ATARAX) 50 MG tablet, 1 tablet as needed Orally every 6 hrs, Disp: , Rfl:     lactulose (CHRONULAC) 10 gram/15 mL solution, Take 15 mLs (10 g total) by mouth 2 (two) times daily as needed (constipation)., Disp: 237 mL, Rfl: 0    lamoTRIgine (LAMICTAL) 200 MG tablet, , Disp: , Rfl:     lubiprostone (AMITIZA) 8 MCG Cap, Take 1 capsule (8 mcg total) by mouth 2 (two) times daily., Disp: 180 capsule, Rfl: 3    meloxicam (MOBIC) 15 MG tablet, Take 1 tablet (15 mg total) by mouth once daily., Disp: 90 tablet, Rfl: 1    mirtazapine (REMERON) 15 MG tablet, Take 1 tablet (15 mg total) by mouth every evening., Disp: 90 tablet, Rfl: 1    mupirocin (BACTROBAN) 2 % ointment, Apply topically 2 (two) times daily. Apply to affected area., Disp: 1 g, Rfl: 3    omeprazole (PRILOSEC) 40 MG capsule, Take 1 capsule (40 mg total) by mouth 2 (two) times daily before meals., Disp: 180 capsule, Rfl: 3    oxycodone-acetaminophen  mg (PERCOCET)  mg per tablet, Take 1 tablet by mouth daily as needed., Disp: , Rfl:     potassium chloride (KLOR-CON) 8 MEQ TbSR, Take 1 tablet (8 mEq total) by mouth once daily., Disp: 90 tablet, Rfl: 1    prazosin (MINIPRESS) 1 MG Cap, , Disp: , Rfl:     pregabalin (LYRICA) 150 MG capsule, Take 2 capsules (300 mg total) by mouth once daily., Disp: 30 capsule, Rfl: 1    promethazine (PHENERGAN) 25 MG tablet, Take 1 tablet (25 mg total) by mouth every 8 (eight) hours as needed for Nausea., Disp: 30 tablet, Rfl: 1    propranoloL (INDERAL) 20 MG tablet, , Disp: , Rfl:     topiramate (TOPAMAX) 100 MG tablet, Take 1 tablet by mouth 2 (two) times a day., Disp: , Rfl:     ziprasidone (GEODON) 60 MG Cap, Take 1 capsule (60 mg total) by mouth once daily., Disp: 90 capsule, Rfl: 1    azithromycin (Z-GIANA) 250 MG tablet, Take 2 tablets by mouth on day 1; Take 1 tablet by mouth on days 2-5, Disp: 6 tablet, Rfl: 0    benzonatate (TESSALON) 200 MG capsule, Take 1 capsule (200 mg total) by mouth 2  "(two) times a day., Disp: 30 capsule, Rfl: 0           ROS  Twelve point system reviewed, unremarkable except for stated above in HPI.        Objective:         Vitals:    01/14/25 1333   BP: 124/83   BP Location: Right arm   Patient Position: Sitting   Pulse: 101   Resp: 17   Temp: 97.5 °F (36.4 °C)   TempSrc: Temporal   SpO2: 98%   Weight: 112.9 kg (249 lb)   Height: 5' 9" (1.753 m)        Physical Exam     Patient is awake alert oriented person place and  Lungs diffuse wheezing  Cardiovascular S1-S2 regular rate and rhythm no murmurs rubs or gallops   Abdomen is soft positive bowel sounds nontender, extremities no clubbing cyanosis edema  Neuro no focal neurological deficits  Skin warm and dry.     Last Labs:     Office Visit on 01/14/2025   Component Date Value    Molecular Strep A, POC 01/14/2025 Negative      Acceptab* 01/14/2025 Yes     POC Rapid COVID 01/14/2025 Negative      Acceptab* 01/14/2025 Yes     POC Molecular Influenza * 01/14/2025 Negative     POC Molecular Influenza * 01/14/2025 Negative      Acceptab* 01/14/2025 Yes     Hemoglobin A1C 01/14/2025 5.6     Estimated Average Glucose 01/14/2025 114     Protein, Urine 01/14/2025 7.9     Creatinine, Urine 01/14/2025 93     Microalbumin 01/14/2025 <0.5     Microalbumin/Creatinine * 01/14/2025         Last Imaging:  EGD  Narrative: Table formatting from the original result was not included.  Procedure Date  10/9/24    Impression  Overall   Impression:    The upper third of the esophagus, middle third of the esophagus, lower   third of the esophagus, Z-line, cardia, fundus of the stomach, body of the   stomach, incisura, antrum, duodenal bulb, 1st part of the duodenum and 2nd   part of the duodenum appeared normal. Performed random biopsy to rule out   eosinophilic esophagitis.  Dilated in the esophagus with Lui-Rashad dilator to 54 Fr ending   size. Dilation caused improved passage of the " scope    Recommendation  Await pathology results  If no improvement with dilation, consider manometry at follow up  Patient with recently diagnosed gastroparesis and prn opioid use for   chronic pain - opioids can cause dysmotility and may be contributing to   dysphagia and gastroparesis    Follow up in GI clinic as scheduled or sooner PRN     Outcome of procedure: successful EGD  Disposition: patient to recovery following procedure; discharge to home   when appropriate parameters met  Provisions for follow up: please call my office for any unexpected   symptoms like chest or abdominal pain or bleeding following your   procedure.  Final Diagnosis: dysphagia     Indication  Dysphagia, unspecified type    Providers  Susan Campos, RN Registered Nurse   Syd Erickson, Luiz Smith CRNA, MD Proceduralist   Heather Kaur RN Technician     Medications  Moderate sedation administered by anesthesia staff - See anesthesia   record.    Preprocedure  A history and physical has been performed, and patient medication   allergies have been reviewed. The patient's tolerance of previous   anesthesia has been reviewed. The risks and benefits of the procedure and   the sedation options and risks were discussed with the patient. All   questions were answered and informed consent obtained.    ASA Score: ASA 3 - Patient with moderate systemic disease with functional   limitations  Mallampati Airway Score: II (hard and soft palate, upper portion of   tonsils anduvula visible)    Details of the Procedure  The patient underwent monitored anesthesia care, which was administered by   an anesthesia professional. The patient's blood pressure, heart rate,   level of consciousness, oxygen, respirations, ECG and ETCO2 were monitored   throughout the procedure. The scope was introduced through the mouth and   advanced to the second part of the duodenum. Retroflexion was performed in   the cardia. The patient's estimated  blood loss was minimal (<5 mL). The   procedure was not difficult. The patient tolerated the procedure well.   There were no apparent adverse events.     Scope: Gastroscope  Scope Serial: [id number]    Events  Procedure Events   Event Event Time     Procedure Events   Event Event Time   SCOPE IN 10/9/2024  2:29 PM   SCOPE OUT 10/9/2024  2:33 PM     Findings  The upper third of the esophagus, middle third of the esophagus, lower   third of the esophagus, Z-line, cardia, fundus of the stomach, body of the   stomach, incisura, antrum, duodenal bulb, 1st part of the duodenum and 2nd   part of the duodenum appeared normal. Z-line is 37 cm from the incisors.   Performed random biopsy using biopsy forceps to rule out eosinophilic   esophagitis.  Dilated in the esophagus with Savpetar-Rashad dilator from 54 Fr starting   size to 54 Fr ending size. Dilation caused improved passage of the scope         **Labs and x-rays personally reviewed by me    ** reviewed           Assessment & Plan:   Assessment & Plan    IMPRESSION:  - Patient negative for COVID, flu, and strep  - Diagnosed with congestion and mild bronchitis  - Noted significant allergies  - Decided on combination therapy: steroid injection, antibiotic course, and symptomatic treatment for cough  - Identified need for finger x-ray to assess injury    DM (DIABETES MELLITUS):  - Ordered A1C test to monitor diabetes management.    HEADACHE:  - Administered Depo-Medrol intramuscular injection in office for immediate relief of severe headache.  - Prescribed medication for ongoing headache management.    EPISTAXIS:  - Patient reports minor epistaxis.  - No immediate intervention required.    BRONCHITIS:  - Diagnosed bronchitis based on clinical presentation and physical exam.  - Assessed for COVID-19, influenza, and streptococcal infection.  - Prescribed Z-Pack (azithromycin) with 1 refill for antibiotic treatment and additional medication for cough management.    FINGER  CONTUSION:  - Identified contusion on left ring finger.  - Ordered finger x-ray to rule out fracture or other complications, with option to obtain x-ray at office tomorrow or at [facility].  - Prescribed Voltaren gel, to be applied 2 times daily to affected finger for pain and inflammation.    FOLLOW UP:  - Scheduled follow-up visit in 2 weeks to assess treatment efficacy for respiratory symptoms, review finger's condition and x-ray results.           1. Bronchitis  -     dexAMETHasone injection 2 mg    2. Cough, unspecified type  -     POCT Strep A, Molecular  -     POCT COVID-19 Rapid Screening  -     POCT Influenza A/B Molecular    3. Class 2 obesity with body mass index (BMI) of 37.0 to 37.9 in adult, unspecified obesity type, unspecified whether serious comorbidity present  -     Hemoglobin A1C; Future; Expected date: 01/14/2025  -     Protein, Random Urine; Future; Expected date: 01/14/2025  -     Microalbumin/Creatinine Ratio, Urine; Future; Expected date: 01/14/2025    4. DM (diabetes mellitus)  -     Hemoglobin A1C; Future; Expected date: 01/14/2025    5. Osteoarthritis of right hip, unspecified osteoarthritis type  -     X-Ray Hip 2 or 3 views Right with Pelvis when performed; Future; Expected date: 01/14/2025    Bronchitis as stated above  -     benzonatate (TESSALON) 200 MG capsule; Take 1 capsule (200 mg total) by mouth 2 (two) times a day.  Dispense: 30 capsule; Refill: 0  -     azithromycin (Z-GIANA) 250 MG tablet; Take 2 tablets by mouth on day 1; Take 1 tablet by mouth on days 2-5  Dispense: 6 tablet; Refill: 0            Juno Brady MD  This note was generated with the assistance of ambient listening technology. Verbal consent was obtained by the patient and accompanying visitor(s) for the recording of patient appointment to facilitate this note. I attest to having reviewed and edited the generated note for accuracy, though some syntax or spelling errors may persist. Please contact the  author of this note for any clarification.

## 2025-02-05 ENCOUNTER — OFFICE VISIT (OUTPATIENT)
Dept: GASTROENTEROLOGY | Facility: CLINIC | Age: 55
End: 2025-02-05
Payer: MEDICARE

## 2025-02-05 VITALS
HEIGHT: 69 IN | DIASTOLIC BLOOD PRESSURE: 91 MMHG | WEIGHT: 259 LBS | OXYGEN SATURATION: 96 % | SYSTOLIC BLOOD PRESSURE: 138 MMHG | HEART RATE: 92 BPM | BODY MASS INDEX: 38.36 KG/M2

## 2025-02-05 DIAGNOSIS — R11.0 NAUSEA: Chronic | ICD-10-CM

## 2025-02-05 DIAGNOSIS — T78.40XD ALLERGY, SUBSEQUENT ENCOUNTER: ICD-10-CM

## 2025-02-05 DIAGNOSIS — R10.13 EPIGASTRIC PAIN: ICD-10-CM

## 2025-02-05 DIAGNOSIS — K59.04 CHRONIC IDIOPATHIC CONSTIPATION: ICD-10-CM

## 2025-02-05 DIAGNOSIS — R09.81 CONGESTION OF NASAL SINUS: ICD-10-CM

## 2025-02-05 DIAGNOSIS — K21.9 GASTROESOPHAGEAL REFLUX DISEASE WITHOUT ESOPHAGITIS: ICD-10-CM

## 2025-02-05 DIAGNOSIS — K31.84 GASTROPARESIS: Primary | ICD-10-CM

## 2025-02-05 DIAGNOSIS — K76.0 HEPATIC STEATOSIS: ICD-10-CM

## 2025-02-05 DIAGNOSIS — R13.10 DYSPHAGIA, UNSPECIFIED TYPE: ICD-10-CM

## 2025-02-05 PROCEDURE — 99215 OFFICE O/P EST HI 40 MIN: CPT | Mod: PBBFAC

## 2025-02-05 PROCEDURE — 99999 PR PBB SHADOW E&M-EST. PATIENT-LVL V: CPT | Mod: PBBFAC,,,

## 2025-02-05 PROCEDURE — 99214 OFFICE O/P EST MOD 30 MIN: CPT | Mod: S$PBB,,,

## 2025-02-05 RX ORDER — LUBIPROSTONE 24 UG/1
24 CAPSULE ORAL 2 TIMES DAILY
Qty: 180 CAPSULE | Refills: 3 | Status: SHIPPED | OUTPATIENT
Start: 2025-02-05 | End: 2026-02-05

## 2025-02-05 NOTE — PATIENT INSTRUCTIONS
Keep in mind gastroparesis is not a static condition, meaning your symptoms can vary from week-to-week or even day-to-day. During a gastroparesis flare you may experience a period of time where all you can take is liquids.     - Stay away from or limit high-fat foods and foods that have lots of fiber, such as oranges and broccoli. These can be hard to digest. Chew food fully.  - Consider eating six small meals a day instead of three large ones. Eating less food may make it easier for the stomach to empty, because it is not as full.  - Certain types of foods and nutrients, such as fat and fiber, take longer to digest and further delay stomach emptying, exacerbating symptoms. A lower fat, lower or modified fiber diet with small frequent meals is generally recommended to reduce gastroparesis symptoms, though tolerance level varies from person to person. Also, soft-textured, ground, pureed and liquid foods may work better than solid foods.  - Exercise can be an effective way to manage symptoms, such as low-impact aerobic exercise (walking, swimming, cycling, or using an elliptical)    Refer to the [facility] online for additional diet resources.

## 2025-02-05 NOTE — PROGRESS NOTES
"Gastroenterology Clinic Note    Patient ID: [id number]   Referring MD: No ref. provider found   Chief Complaint:   Chief Complaint   Patient presents with    Follow-up       History of Present Illness   Shayy Hunter is an 54 y.o. WF who is referred for GERD symptoms. Patient reports daily symptoms of epigastric pain/burning. Symptoms will wake her from sleep at night. Also experiencing recurrent nausea and vomiting. She has tried multiple therapies in the past including omeprazole and pantoprazole along with OTC therapies. None of these provided her with relief of symptoms. She reports taking Dexilant for many years now; this has controlled her symptoms well until she ran out and medication was no longer covered by her insurance. She is requesting refill today. She complains of dysphagia. Required dilation in 2017 with Dr. Crespo that improved symptoms until they returned over the past several months. She reports alternating constipation and diarrhea throughout her life. Was seen by GI in 2021 for diarrhea; now reports she is experiencing just constipation. Denies any hematochezia or melena. She has tried OTC laxatives and stool softeners without relief; also tried Movantik but this caused a lot of stomach cramping. Reports lactulose has helped with constipation in the past. She had colonoscopy in 2008; denies any FMH of CRC or IBD.       Previous workup:EGD    Last colonoscopy was 01/24/2024 with 5 yr recall.    Interval   - EGD with dilation improved symptoms of dysphagia somewhat; however, she continues to experience feeling like her food is getting "hung up"  - she reports mild improvement in constipation with Amitiza 8 mcg b.i.d.  - gastric emptying study was delayed consistent with gastroparesis  - she is on opioids for chronic pain  - she continues to report upper abdominal pain and early satiety    Review of Systems   Constitutional:  Negative for weight loss.   Gastrointestinal:  Positive for abdominal pain, " constipation, heartburn and nausea. Negative for blood in stool, diarrhea and melena.       Past Medical History      Past Medical History:   Diagnosis Date    Depression 2012    GERD (gastroesophageal reflux disease)     Herniation of intervertebral disc between L4 and L5     L4, L5, S1    LBP (low back pain) 2012    Sciatica        Past Surgical History     Past Surgical History:   Procedure Laterality Date    ANTERIOR CERVICAL DISCECTOMY W/ FUSION  2016    BACK SURGERY      back surgery  2015    CHOLECYSTECTOMY  2012    TONSILLECTOMY         Allergies     Review of patient's allergies indicates:   Allergen Reactions    Perflutren lipid microspheres Shortness Of Breath, Nausea Only, Rash and Other (See Comments)     Red faced, short of breath and nauseated.     Amoxicillin Rash and Other (See Comments)       Immunization History     Immunization History   Administered Date(s) Administered    COVID-19, MRNA, LN-S, PF (MODERNA FULL 0.5 ML DOSE) 2021, 08/10/2021    DTP 1973, 1973, 1973, 1974, 1977    IPV 1973, 1973, 1973, 1974, 1977    Influenza - Quadrivalent - PF *Preferred* (6 months and older) 2013, 10/24/2019, 2020, 10/27/2021, 2023, 2024    Influenza Split 2013    Measles / Rubella 1973    Pneumococcal Conjugate - 20 Valent 2023    Td (ADULT) 1995       Past Family History      Family History   Problem Relation Name Age of Onset    Hypertension Mother      Diabetes Mother      Neuropathy Father         Past Social History      Social History     Socioeconomic History    Marital status: Single   Tobacco Use    Smoking status: Former     Current packs/day: 0.00     Average packs/day: 0.1 packs/day for 2.0 years (0.3 ttl pk-yrs)     Types: Cigarettes     Start date: 2009     Quit date: 2011     Years since quittin.0    Smokeless tobacco: Never    Tobacco comments:      quit 1 1/2 years ago   Substance and Sexual Activity    Alcohol use: Not Currently    Drug use: Never       Current Medications     Outpatient Medications Marked as Taking for the 2/5/25 encounter (Office Visit) with Tri Head FNP   Medication Sig Dispense Refill    ALPRAZolam (XANAX) 0.25 MG tablet Take 0.25 mg by mouth.      baclofen (LIORESAL) 10 MG tablet Take 1 tablet (10 mg total) by mouth 3 (three) times daily. 180 tablet 1    benzonatate (TESSALON) 200 MG capsule Take 1 capsule (200 mg total) by mouth 2 (two) times a day. 30 capsule 0    cetirizine (ZYRTEC) 10 MG tablet Take 1 tablet (10 mg total) by mouth once daily. 90 tablet 1    citalopram (CELEXA) 10 MG tablet Take 1 tablet (10 mg total) by mouth once daily. 30 tablet 1    clindamycin (CLEOCIN T) 1 % lotion Apply topically.      docusate sodium (COLACE) 100 MG capsule Take 1 capsule (100 mg total) by mouth 2 (two) times daily. 60 capsule 6    fluticasone propionate (FLONASE) 50 mcg/actuation nasal spray 2 sprays (100 mcg total) by Each Nostril route daily as needed for Rhinitis. 16 g 3    furosemide (LASIX) 20 MG tablet Take 1 tablet (20 mg total) by mouth once daily. 90 tablet 1    gabapentin (NEURONTIN) 300 MG capsule 1 capsule before bedtime Orally Twice a day      guaiFENesin (MUCINEX) 600 mg 12 hr tablet Take 1 tablet (600 mg total) by mouth 2 (two) times daily. 20 tablet 0    hydrOXYzine (ATARAX) 50 MG tablet 1 tablet as needed Orally every 6 hrs      lactulose (CHRONULAC) 10 gram/15 mL solution Take 15 mLs (10 g total) by mouth 2 (two) times daily as needed (constipation). 237 mL 0    lamoTRIgine (LAMICTAL) 200 MG tablet       meloxicam (MOBIC) 15 MG tablet Take 1 tablet (15 mg total) by mouth once daily. 90 tablet 1    mirtazapine (REMERON) 15 MG tablet Take 1 tablet (15 mg total) by mouth every evening. 90 tablet 1    mupirocin (BACTROBAN) 2 % ointment Apply topically 2 (two) times daily. Apply to affected area. 1 g 3    omeprazole  "(PRILOSEC) 40 MG capsule Take 1 capsule (40 mg total) by mouth 2 (two) times daily before meals. 180 capsule 3    oxycodone-acetaminophen  mg (PERCOCET)  mg per tablet Take 1 tablet by mouth daily as needed.      potassium chloride (KLOR-CON) 8 MEQ TbSR Take 1 tablet (8 mEq total) by mouth once daily. 90 tablet 1    prazosin (MINIPRESS) 1 MG Cap       pregabalin (LYRICA) 150 MG capsule Take 2 capsules (300 mg total) by mouth once daily. 30 capsule 1    promethazine (PHENERGAN) 25 MG tablet Take 1 tablet (25 mg total) by mouth every 8 (eight) hours as needed for Nausea. 30 tablet 1    propranoloL (INDERAL) 20 MG tablet       topiramate (TOPAMAX) 100 MG tablet Take 1 tablet by mouth 2 (two) times a day.      ziprasidone (GEODON) 60 MG Cap Take 1 capsule (60 mg total) by mouth once daily. 90 capsule 1    [DISCONTINUED] lubiprostone (AMITIZA) 8 MCG Cap Take 1 capsule (8 mcg total) by mouth 2 (two) times daily. 180 capsule 3        I have reviewed the current medications, allergies, vital signs, past medical and surgical history, family medical history, and social history for this encounter and agree with all findings.    OBJECTIVE    Physical Exam    BP (!) 138/91 (BP Location: Left arm, Patient Position: Sitting)   Pulse 92   Ht 5' 9" (1.753 m)   Wt 117.5 kg (259 lb)   SpO2 96%   BMI 38.25 kg/m²   GEN: Well appearing, cooperative, NAD, Obese  NECK: Supple, no LAD  CV: Normal rate  RESP: Unlabored  ABD: ND,  no guarding  EXT: No clubbing, cyanosis, or edema  SKIN: Warm and dry  NEURO: AAO x4.     LABS    CBC (with or without Differential):   Lab Results   Component Value Date    WBC 5.37 09/12/2023    WBC 6.41 10/18/2012    HGB 14.6 09/12/2023    HGB 13.6 10/18/2012    HCT 45.1 09/12/2023    HCT 41.0 10/18/2012    MCV 89.5 09/12/2023    MCV 85.1 10/18/2012    MCH 29.0 09/12/2023    MCH 28.2 10/18/2012    MCHC 32.4 09/12/2023    MCHC 33.2 10/18/2012    RDW 13.3 09/12/2023    RDW 13.3 10/18/2012    PLT " 316 09/12/2023     (H) 10/18/2012    MPV 9.5 09/12/2023    MPV 8.9 (L) 10/18/2012    NEUTOPHILPCT 59.0 09/12/2023    DIFFTYPE Auto 09/12/2023     BMP/CMP:   Lab Results   Component Value Date     09/12/2023     10/18/2012    K 4.2 09/12/2023    K 3.9 10/18/2012     09/12/2023     10/18/2012    CO2 28 09/12/2023    CO2 22 (L) 10/18/2012    BUN 9 09/12/2023    BUN 5 (L) 10/18/2012    CREATININE 0.91 09/12/2023    CREATININE 0.7 10/18/2012     09/12/2023    GLU 97 10/18/2012    CALCIUM 9.0 09/12/2023    CALCIUM 9.0 10/18/2012    ALBUMIN 3.8 10/18/2012    AST 18 10/18/2012    ALT 15 10/18/2012    ALKPHOS 95 10/18/2012        IMAGING  Gastric emptying study 10/2024  - delayed gastric emptying    ASSESSMENT  Shayy Hunter is a 54 y.o. WF with PMH of GERD and depression who is referred for follow-up.    1. Gastroparesis    2. Epigastric pain    3. Chronic idiopathic constipation    4. Gastroesophageal reflux disease without esophagitis    5. Dysphagia, unspecified type    6. Hepatic steatosis           PLAN    - increase Amitiza to 24 mcg b.i.d. for chronic constipation  - refer for esophageal manometry for persistent dysphagia  - gastroparesis diet  - discussed that opioids could be a contributing factor to both dysphagia and gastroparesis  - labs and ultrasound liver for surveillance of hepatic steatosis    Orders Placed This Encounter   Procedures    US Abdomen Limited_Liver     Standing Status:   Future     Standing Expiration Date:   2/5/2026     Order Specific Question:   May the Radiologist modify the order per protocol to meet the clinical needs of the patient?     Answer:   Yes     Order Specific Question:   Release to patient     Answer:   Immediate    CBC Auto Differential     Standing Status:   Future     Number of Occurrences:   1     Standing Expiration Date:   4/6/2026    Comprehensive Metabolic Panel     Standing Status:   Future     Number of Occurrences:   1      Standing Expiration Date:   4/6/2026         The risks and benefits of my recommendations, as well as other treatment options were discussed with the patient today. All questions were answered.    35 minutes of total time spent on the encounter, which includes face to face time and non-face to face time preparing to see the patient (eg, review of tests), obtaining and/or reviewing separately obtained history, documenting clinical information in the electronic or other health record, Independently interpreting results (not separately reported) and communicating results to the patient/family/caregiver, or care coordination (not separately reported).        Tri Head, CÉSARP/ACNP  [facility]

## 2025-02-06 RX ORDER — PROMETHAZINE HYDROCHLORIDE 25 MG/1
25 TABLET ORAL EVERY 8 HOURS PRN
Qty: 30 TABLET | Refills: 1 | Status: SHIPPED | OUTPATIENT
Start: 2025-02-06

## 2025-02-06 RX ORDER — CETIRIZINE HYDROCHLORIDE 10 MG/1
10 TABLET ORAL DAILY
Qty: 90 TABLET | Refills: 1 | Status: SHIPPED | OUTPATIENT
Start: 2025-02-06

## 2025-02-06 RX ORDER — FLUTICASONE PROPIONATE 50 MCG
2 SPRAY, SUSPENSION (ML) NASAL DAILY PRN
Qty: 16 G | Refills: 3 | Status: SHIPPED | OUTPATIENT
Start: 2025-02-06

## 2025-02-11 ENCOUNTER — TELEPHONE (OUTPATIENT)
Dept: GASTROENTEROLOGY | Facility: CLINIC | Age: 55
End: 2025-02-11
Payer: MEDICARE

## 2025-02-11 DIAGNOSIS — R10.84 GENERALIZED ABDOMINAL PAIN: Primary | ICD-10-CM

## 2025-02-11 RX ORDER — DICYCLOMINE HYDROCHLORIDE 10 MG/1
10 CAPSULE ORAL 3 TIMES DAILY PRN
Qty: 120 CAPSULE | Refills: 3 | Status: SHIPPED | OUTPATIENT
Start: 2025-02-11

## 2025-02-11 NOTE — TELEPHONE ENCOUNTER
----- Message from KOLE Ying sent at 2/11/2025  2:29 PM CST -----  Her labs are ok. I sent the bentyl

## 2025-02-11 NOTE — TELEPHONE ENCOUNTER
Spoke with patient and informed her that Tri Head NP increased her Amitiza. Recommend Bentyl as needed per Tri Head NP.

## 2025-02-11 NOTE — TELEPHONE ENCOUNTER
----- Message from Melba sent at 2/11/2025  1:05 PM CST -----  Regarding: Test results and medication  Who Called: Shayy Hunter    Patient has questions about test results being abnormal. Patient states that there were to be 2 medications called in to the pharmacy and there was only one at [facility].   lubiprostone (AMITIZA) 24 MCG Cap 180 capsule 3 2/5/2025 2/5/2026 No  Sig - Route: Take 1 capsule (24 mcg total) by mouth 2 (two) times daily. - Oral  Sent to pharmacy as: lubiprostone (AMITIZA) 24 MCG Cap    Patient states that she needs something for stomach pains. The pains comes sporadically. Patient states that she thought that there would be something called in for the kicked in the stomach feeling and bloating.     Preferred Method of Contact: Phone Call  Patient's Preferred Phone Number on File: [phone]   Best Call Back Number, if different:  Additional Information:

## 2025-02-25 ENCOUNTER — HOSPITAL ENCOUNTER (OUTPATIENT)
Dept: RADIOLOGY | Facility: HOSPITAL | Age: 55
Discharge: HOME | End: 2025-02-25
Payer: MEDICARE

## 2025-02-25 DIAGNOSIS — K76.0 HEPATIC STEATOSIS: ICD-10-CM

## 2025-02-25 PROCEDURE — 76705 ECHO EXAM OF ABDOMEN: CPT | Mod: 26,,, | Performed by: RADIOLOGY

## 2025-02-25 PROCEDURE — 76705 ECHO EXAM OF ABDOMEN: CPT | Mod: TC

## 2025-02-26 ENCOUNTER — RESULTS FOLLOW-UP (OUTPATIENT)
Dept: GASTROENTEROLOGY | Facility: CLINIC | Age: 55
End: 2025-02-26
Payer: MEDICARE

## 2025-02-27 NOTE — TELEPHONE ENCOUNTER
----- Message from KOLE Ying sent at 2/26/2025  3:58 PM CST -----  Findings consistent with fatty liver.  No changes in our current treatment plan.    - Take all of your medicines as instructed.  - Avoid alcohol. Alcohol can make liver problems worse.  - Eat a healthy diet with plenty of vegetables, fruits, and whole grains.  - Get regular physical activity. Even gentle activity, like walking, is good for your health.  - Treat your high blood sugar if you have diabetes  - Treat your high cholesterol if you have it     ----- Message -----  From: Interface, Rad Results In  Sent: 2/25/2025   4:28 PM CST  To: KOLE Ying

## 2025-04-02 ENCOUNTER — OFFICE VISIT (OUTPATIENT)
Dept: FAMILY MEDICINE | Facility: CLINIC | Age: 55
End: 2025-04-02
Payer: MEDICARE

## 2025-04-02 VITALS
HEIGHT: 69 IN | HEART RATE: 96 BPM | TEMPERATURE: 98 F | OXYGEN SATURATION: 95 % | WEIGHT: 263 LBS | SYSTOLIC BLOOD PRESSURE: 132 MMHG | BODY MASS INDEX: 38.95 KG/M2 | DIASTOLIC BLOOD PRESSURE: 83 MMHG | RESPIRATION RATE: 18 BRPM

## 2025-04-02 DIAGNOSIS — R60.9 EDEMA, UNSPECIFIED TYPE: ICD-10-CM

## 2025-04-02 DIAGNOSIS — R09.81 CONGESTION OF NASAL SINUS: ICD-10-CM

## 2025-04-02 DIAGNOSIS — J30.9 ALLERGIC RHINITIS, UNSPECIFIED SEASONALITY, UNSPECIFIED TRIGGER: ICD-10-CM

## 2025-04-02 DIAGNOSIS — R68.84 JAW PAIN: ICD-10-CM

## 2025-04-02 DIAGNOSIS — R11.0 NAUSEA: Chronic | ICD-10-CM

## 2025-04-02 DIAGNOSIS — R05.9 COUGH, UNSPECIFIED TYPE: Primary | ICD-10-CM

## 2025-04-02 LAB
CTP QC/QA: YES
MOLECULAR STREP A: NEGATIVE
POC MOLECULAR INFLUENZA A AGN: NEGATIVE
POC MOLECULAR INFLUENZA B AGN: NEGATIVE
SARS-COV-2 RDRP RESP QL NAA+PROBE: NEGATIVE

## 2025-04-02 PROCEDURE — 87635 SARS-COV-2 COVID-19 AMP PRB: CPT | Mod: RHCUB | Performed by: INTERNAL MEDICINE

## 2025-04-02 PROCEDURE — 87502 INFLUENZA DNA AMP PROBE: CPT | Mod: RHCUB | Performed by: INTERNAL MEDICINE

## 2025-04-02 PROCEDURE — 96372 THER/PROPH/DIAG INJ SC/IM: CPT | Mod: ,,, | Performed by: INTERNAL MEDICINE

## 2025-04-02 PROCEDURE — 87651 STREP A DNA AMP PROBE: CPT | Mod: RHCUB | Performed by: INTERNAL MEDICINE

## 2025-04-02 PROCEDURE — 99214 OFFICE O/P EST MOD 30 MIN: CPT | Mod: ,,, | Performed by: INTERNAL MEDICINE

## 2025-04-02 RX ORDER — PROMETHAZINE HYDROCHLORIDE 25 MG/1
25 TABLET ORAL EVERY 8 HOURS PRN
Qty: 30 TABLET | Refills: 1 | Status: SHIPPED | OUTPATIENT
Start: 2025-04-02

## 2025-04-02 RX ORDER — METHYLPREDNISOLONE 4 MG/1
TABLET ORAL
Qty: 21 EACH | Refills: 0 | Status: SHIPPED | OUTPATIENT
Start: 2025-04-02 | End: 2025-04-23

## 2025-04-02 RX ORDER — FUROSEMIDE 20 MG/1
20 TABLET ORAL DAILY
Qty: 90 TABLET | Refills: 1 | Status: SHIPPED | OUTPATIENT
Start: 2025-04-02

## 2025-04-02 RX ORDER — NAPROXEN 500 MG/1
500 TABLET ORAL 2 TIMES DAILY PRN
Qty: 30 TABLET | Refills: 1 | Status: SHIPPED | OUTPATIENT
Start: 2025-04-02

## 2025-04-02 RX ORDER — FLUTICASONE PROPIONATE 50 MCG
2 SPRAY, SUSPENSION (ML) NASAL DAILY PRN
Qty: 16 G | Refills: 3 | Status: CANCELLED | OUTPATIENT
Start: 2025-04-02

## 2025-04-02 RX ORDER — BENZONATATE 200 MG/1
200 CAPSULE ORAL 2 TIMES DAILY
Qty: 30 CAPSULE | Refills: 0 | Status: SHIPPED | OUTPATIENT
Start: 2025-04-02

## 2025-04-02 RX ORDER — FLUTICASONE PROPIONATE 50 MCG
1 SPRAY, SUSPENSION (ML) NASAL DAILY
Qty: 16 G | Refills: 1 | Status: SHIPPED | OUTPATIENT
Start: 2025-04-02

## 2025-04-02 RX ORDER — DEXAMETHASONE SODIUM PHOSPHATE 4 MG/ML
2 INJECTION, SOLUTION INTRA-ARTICULAR; INTRALESIONAL; INTRAMUSCULAR; INTRAVENOUS; SOFT TISSUE
Status: COMPLETED | OUTPATIENT
Start: 2025-04-02 | End: 2025-04-02

## 2025-04-02 RX ADMIN — DEXAMETHASONE SODIUM PHOSPHATE 2 MG: 4 INJECTION, SOLUTION INTRA-ARTICULAR; INTRALESIONAL; INTRAMUSCULAR; INTRAVENOUS; SOFT TISSUE at 02:04

## 2025-04-02 NOTE — PROGRESS NOTES
"Subjective:       Patient ID: Shayy Hunter is a 54 y.o. female.    Chief Complaint: Nasal Congestion, Cough, Health Maintenance (Cervical Cancer Screening Never done/Foot Exam Never done/Diabetic Eye Exam Never done/HIV Screening Never done/Low Dose Statin Never done/Shingles Vaccine(1 of 2) Never done/COVID-19 Vaccine(3 - 2024-25 season) due on 09/01/2024 //Pt refuses all care gaps for today pt is ill. /Sm ), Neck Pain, Shoulder Pain, and Back Pain    History of Present Illness    CHIEF COMPLAINT:  Patient presents today for sinus symptoms    SINUS SYMPTOMS:  She presents with headache, nasal congestion, and sore throat. She tested negative for COVID, flu, and strep.    TMJ:  She reports severe jaw pain with frequent popping sensation over the past few days. The pain is localized to a specific area.    MEDICAL HISTORY:  She denies any history of diabetes and requests correction of previous incorrect documentation in medical records.    CURRENT MEDICATIONS:  She is currently taking Flonase two puffs daily, Lasix, and Jax for nausea.         Current Medications:  Current Medications[1]           ROS  Twelve point system reviewed, unremarkable except for stated above in HPI.        Objective:         Vitals:    04/02/25 1321   BP: 132/83   BP Location: Left arm   Patient Position: Sitting   Pulse: 96   Resp: 18   Temp: 97.8 °F (36.6 °C)   TempSrc: Temporal   SpO2: 95%   Weight: 119.3 kg (263 lb)   Height: 5' 9" (1.753 m)        Physical Exam     Patient is awake alert oriented person place and  Lungs are clear to auscultation bilaterally no crackles or wheezes   Cardiovascular S1-S2 regular rate and rhythm no murmurs rubs or gallops   Abdomen is soft positive bowel sounds nontender, extremities no clubbing cyanosis edema  Neuro no focal neurological deficits  Skin warm and dry.     Last Labs:     Office Visit on 04/02/2025   Component Date Value    Molecular Strep A, POC 04/02/2025 Negative      " Acceptab* 04/02/2025 Yes     POC Rapid COVID 04/02/2025 Negative      Acceptab* 04/02/2025 Yes     POC Molecular Influenza * 04/02/2025 Negative     POC Molecular Influenza * 04/02/2025 Negative      Acceptab* 04/02/2025 Yes        Last Imaging:  US Abdomen Limited_Liver  Narrative: EXAMINATION:  US ABDOMEN LIMITED_LIVER    CLINICAL HISTORY:  Fatty (change of) liver, not elsewhere classified    TECHNIQUE:  Limited ultrasound of the right upper quadrant of the abdomen (including pancreas, liver, gallbladder, common bile duct, and spleen) was performed.    COMPARISON:  Ultrasound 11/17/2021.    FINDINGS:  Pancreas: Visualized portion of the pancreas are normal.    Liver: Normal in size, measuring 16.2 cm. Fatty liver infiltration. No focal hepatic lesions.    Gallbladder: Cholecystectomy.    Biliary system: The common duct is borderline dilated, measuring 7 mm.  No intrahepatic ductal dilatation.    Spleen: Normal in size and echotexture, measuring 10.8 x 6.3 cm.    Miscellaneous: No upper abdominal ascites.  Impression: 1. Hepatic steatosis.  2. Cholecystectomy.    Electronically signed by: Juancarlos Duran MD  Date:    02/25/2025  Time:    16:25         **Labs and x-rays personally reviewed by me    ** reviewed           Assessment & Plan:   Assessment & Plan    R05.9 Cough, unspecified type  R09.81 Congestion of nasal sinus  R60.9 Edema, unspecified type  R11.0 Nausea  E66.01 Severe obesity (BMI 35.0-39.9) with comorbidity    IMPRESSION:  - Assessed sinus problems and TMJ symptoms.  - Determined need for steroid injection and pack to address sinus problems.  - Considered pain management for TMJ discomfort.  - Reviewed A1C results to confirm absence of diabetes diagnosis.  - Corrected medical record to remove erroneous diabetes notation.    R05.9 COUGH, UNSPECIFIED TYPE:  - Initiated medication for cough management.    R09.81 CONGESTION OF NASAL SINUS:  - Increased Flonase dosage to 2  puffs daily.  - Ordered XR Sinus imaging to assess sinus condition.    R60.9 EDEMA, UNSPECIFIED TYPE:  - Refilled Lasix prescription for management of fluid retention.    R11.0 NAUSEA:  - Refilled Jax prescription for nausea management.    ** DIAGNOSES NOT IN VISIT DX OR FOR REVIEW **  E66.01 SEVERE OBESITY (BMI 35.0-39.9) WITH COMORBIDITY:  - Continued Lasix prescription to manage obesity-related fluid retention.  - Will monitor patient's response to treatment.           1. Cough, unspecified type  -     POCT Strep A, Molecular  -     POCT COVID-19 Rapid Screening  -     POCT Influenza A/B Molecular  -     benzonatate (TESSALON) 200 MG capsule; Take 1 capsule (200 mg total) by mouth 2 (two) times a day.  Dispense: 30 capsule; Refill: 0    2. Congestion of nasal sinus    3. Edema, unspecified type  -     furosemide (LASIX) 20 MG tablet; Take 1 tablet (20 mg total) by mouth once daily.  Dispense: 90 tablet; Refill: 1    4. Nausea  Comments:  Refill her Phenergan.  Overview:  Refill her Phenergan.    Orders:  -     promethazine (PHENERGAN) 25 MG tablet; Take 1 tablet (25 mg total) by mouth every 8 (eight) hours as needed for Nausea.  Dispense: 30 tablet; Refill: 1    5. Allergic rhinitis, unspecified seasonality, unspecified trigger  -     dexAMETHasone injection 2 mg  -     Cancel: X-Ray Sinuses Min 3 Views; Future; Expected date: 04/02/2025  -     methylPREDNISolone (MEDROL DOSEPACK) 4 mg tablet; use as directed  Dispense: 21 each; Refill: 0  -     fluticasone propionate (FLONASE) 50 mcg/actuation nasal spray; 1 spray (50 mcg total) by Each Nostril route once daily.  Dispense: 16 g; Refill: 1    6. Jaw pain  -     X-Ray Facial Bones  3 Or More View; Future; Expected date: 04/02/2025  -     naproxen (NAPROSYN) 500 MG tablet; Take 1 tablet (500 mg total) by mouth 2 (two) times daily as needed (for pain).  Dispense: 30 tablet; Refill: 1            Juno Brady MD  This note was generated with the assistance  of ambient listening technology. Verbal consent was obtained by the patient and accompanying visitor(s) for the recording of patient appointment to facilitate this note. I attest to having reviewed and edited the generated note for accuracy, though some syntax or spelling errors may persist. Please contact the author of this note for any clarification.            [1]   Current Outpatient Medications:     ALPRAZolam (XANAX) 0.25 MG tablet, Take 0.25 mg by mouth., Disp: , Rfl:     baclofen (LIORESAL) 10 MG tablet, Take 1 tablet (10 mg total) by mouth 3 (three) times daily., Disp: 180 tablet, Rfl: 1    cetirizine (ZYRTEC) 10 MG tablet, Take 1 tablet (10 mg total) by mouth once daily., Disp: 90 tablet, Rfl: 1    citalopram (CELEXA) 10 MG tablet, Take 1 tablet (10 mg total) by mouth once daily., Disp: 30 tablet, Rfl: 1    clindamycin (CLEOCIN T) 1 % lotion, Apply topically., Disp: , Rfl:     dicyclomine (BENTYL) 10 MG capsule, Take 1 capsule (10 mg total) by mouth 3 (three) times daily as needed (abdominal pain)., Disp: 120 capsule, Rfl: 3    docusate sodium (COLACE) 100 MG capsule, Take 1 capsule (100 mg total) by mouth 2 (two) times daily., Disp: 60 capsule, Rfl: 6    fluticasone propionate (FLONASE) 50 mcg/actuation nasal spray, 2 sprays (100 mcg total) by Each Nostril route daily as needed for Rhinitis., Disp: 16 g, Rfl: 3    gabapentin (NEURONTIN) 300 MG capsule, 1 capsule before bedtime Orally Twice a day, Disp: , Rfl:     guaiFENesin (MUCINEX) 600 mg 12 hr tablet, Take 1 tablet (600 mg total) by mouth 2 (two) times daily., Disp: 20 tablet, Rfl: 0    hydrOXYzine (ATARAX) 50 MG tablet, 1 tablet as needed Orally every 6 hrs, Disp: , Rfl:     lactulose (CHRONULAC) 10 gram/15 mL solution, Take 15 mLs (10 g total) by mouth 2 (two) times daily as needed (constipation)., Disp: 237 mL, Rfl: 0    lamoTRIgine (LAMICTAL) 200 MG tablet, , Disp: , Rfl:     lubiprostone (AMITIZA) 24 MCG Cap, Take 1 capsule (24 mcg total) by  mouth 2 (two) times daily., Disp: 180 capsule, Rfl: 3    meloxicam (MOBIC) 15 MG tablet, Take 1 tablet (15 mg total) by mouth once daily., Disp: 90 tablet, Rfl: 1    mirtazapine (REMERON) 15 MG tablet, Take 1 tablet (15 mg total) by mouth every evening., Disp: 90 tablet, Rfl: 1    mupirocin (BACTROBAN) 2 % ointment, Apply topically 2 (two) times daily. Apply to affected area., Disp: 1 g, Rfl: 3    omeprazole (PRILOSEC) 40 MG capsule, Take 1 capsule (40 mg total) by mouth 2 (two) times daily before meals., Disp: 180 capsule, Rfl: 3    oxycodone-acetaminophen  mg (PERCOCET)  mg per tablet, Take 1 tablet by mouth daily as needed., Disp: , Rfl:     potassium chloride (KLOR-CON) 8 MEQ TbSR, Take 1 tablet (8 mEq total) by mouth once daily., Disp: 90 tablet, Rfl: 1    prazosin (MINIPRESS) 1 MG Cap, , Disp: , Rfl:     pregabalin (LYRICA) 150 MG capsule, Take 2 capsules (300 mg total) by mouth once daily., Disp: 30 capsule, Rfl: 1    propranoloL (INDERAL) 20 MG tablet, , Disp: , Rfl:     topiramate (TOPAMAX) 100 MG tablet, Take 1 tablet by mouth 2 (two) times a day., Disp: , Rfl:     ziprasidone (GEODON) 60 MG Cap, Take 1 capsule (60 mg total) by mouth once daily., Disp: 90 capsule, Rfl: 1    benzonatate (TESSALON) 200 MG capsule, Take 1 capsule (200 mg total) by mouth 2 (two) times a day., Disp: 30 capsule, Rfl: 0    fluticasone propionate (FLONASE) 50 mcg/actuation nasal spray, 1 spray (50 mcg total) by Each Nostril route once daily., Disp: 16 g, Rfl: 1    furosemide (LASIX) 20 MG tablet, Take 1 tablet (20 mg total) by mouth once daily., Disp: 90 tablet, Rfl: 1    methylPREDNISolone (MEDROL DOSEPACK) 4 mg tablet, use as directed, Disp: 21 each, Rfl: 0    naproxen (NAPROSYN) 500 MG tablet, Take 1 tablet (500 mg total) by mouth 2 (two) times daily as needed (for pain)., Disp: 30 tablet, Rfl: 1    promethazine (PHENERGAN) 25 MG tablet, Take 1 tablet (25 mg total) by mouth every 8 (eight) hours as needed for  Nausea., Disp: 30 tablet, Rfl: 1  No current facility-administered medications for this visit.

## 2025-04-04 ENCOUNTER — RESULTS FOLLOW-UP (OUTPATIENT)
Dept: FAMILY MEDICINE | Facility: CLINIC | Age: 55
End: 2025-04-04
Payer: MEDICARE

## 2025-04-04 DIAGNOSIS — K21.9 GASTROESOPHAGEAL REFLUX DISEASE WITHOUT ESOPHAGITIS: ICD-10-CM

## 2025-04-08 RX ORDER — OMEPRAZOLE 40 MG/1
40 CAPSULE, DELAYED RELEASE ORAL
Qty: 180 CAPSULE | Refills: 3 | Status: SHIPPED | OUTPATIENT
Start: 2025-04-08 | End: 2026-04-08

## 2025-04-17 ENCOUNTER — OFFICE VISIT (OUTPATIENT)
Dept: FAMILY MEDICINE | Facility: CLINIC | Age: 55
End: 2025-04-17
Payer: MEDICARE

## 2025-04-17 VITALS
DIASTOLIC BLOOD PRESSURE: 84 MMHG | WEIGHT: 265.19 LBS | HEART RATE: 97 BPM | BODY MASS INDEX: 39.28 KG/M2 | HEIGHT: 69 IN | SYSTOLIC BLOOD PRESSURE: 139 MMHG | OXYGEN SATURATION: 95 % | RESPIRATION RATE: 18 BRPM | TEMPERATURE: 97 F

## 2025-04-17 DIAGNOSIS — R05.9 COUGH, UNSPECIFIED TYPE: ICD-10-CM

## 2025-04-17 DIAGNOSIS — J34.9 SINUS DISEASE: Primary | ICD-10-CM

## 2025-04-17 DIAGNOSIS — E66.812 CLASS 2 OBESITY WITH BODY MASS INDEX (BMI) OF 37.0 TO 37.9 IN ADULT, UNSPECIFIED OBESITY TYPE, UNSPECIFIED WHETHER SERIOUS COMORBIDITY PRESENT: ICD-10-CM

## 2025-04-17 PROCEDURE — 99214 OFFICE O/P EST MOD 30 MIN: CPT | Mod: ,,, | Performed by: INTERNAL MEDICINE

## 2025-04-17 RX ORDER — PROMETHAZINE HYDROCHLORIDE AND DEXTROMETHORPHAN HYDROBROMIDE 6.25; 15 MG/5ML; MG/5ML
7.5 SYRUP ORAL 3 TIMES DAILY PRN
Qty: 500 ML | Refills: 0 | Status: SHIPPED | OUTPATIENT
Start: 2025-04-17

## 2025-04-17 RX ORDER — PHENTERMINE HYDROCHLORIDE 37.5 MG/1
37.5 TABLET ORAL DAILY
Qty: 30 TABLET | Refills: 0 | Status: SHIPPED | OUTPATIENT
Start: 2025-04-17

## 2025-04-17 NOTE — PROGRESS NOTES
"Subjective:       Patient ID: Shayy Hunter is a 54 y.o. female.    Chief Complaint: Health Maintenance (Cervical Cancer Screening Never done/Foot Exam Never done/Diabetic Eye Exam Never done/HIV Screening Never done/Low Dose Statin Never done/Shingles Vaccine(1 of 2) Never done/COVID-19 Vaccine(3 - 2024-25 season) due on 09/01/2024/Lipid Panel due on 05/08/2025/Mammogram due on 07/08/2025/), Leg Pain, Neck Pain, and Back Pain    History of Present Illness    CHIEF COMPLAINT:  Patient presents today for follow up of persistent cough    RESPIRATORY:  She reports improvement but continues to have a "hacky" cough with thick white sputum production. She denies yellow or green sputum. Previous course of antibiotics was helpful in alleviating symptoms.    PAIN:  She reports jaw pain attributed to teeth clenching due to chronic pain. She also notes mild pressure sensation in knee.    WEIGHT MANAGEMENT:  She reports continued weight gain despite lifestyle modifications and expresses interest in weight loss medications.    MEDICATIONS:  She has history of Tensilon Pearls prescription and reports codeine allergy/intolerance.         Current Medications:  Current Medications[1]           ROS  Twelve point system reviewed, unremarkable except for stated above in HPI.        Objective:         Vitals:    04/17/25 1323   BP: 139/84   BP Location: Left arm   Patient Position: Sitting   Pulse: 97   Resp: 18   Temp: 97.1 °F (36.2 °C)   TempSrc: Temporal   SpO2: 95%   Weight: 120.3 kg (265 lb 3.2 oz)   Height: 5' 9" (1.753 m)        Physical Exam     Patient is awake alert oriented person place and  Lungs are clear to auscultation bilaterally no crackles or wheezes   Cardiovascular S1-S2 regular rate and rhythm no murmurs rubs or gallops   Abdomen is soft positive bowel sounds nontender, extremities no clubbing cyanosis edema  Neuro no focal neurological deficits  Skin warm and dry.     Last Labs:     Office Visit on 04/02/2025 "   Component Date Value    Molecular Strep A, POC 04/02/2025 Negative      Acceptab* 04/02/2025 Yes     POC Rapid COVID 04/02/2025 Negative      Acceptab* 04/02/2025 Yes     POC Molecular Influenza * 04/02/2025 Negative     POC Molecular Influenza * 04/02/2025 Negative      Acceptab* 04/02/2025 Yes        Last Imaging:  X-Ray Facial Bones  3 Or More View  Narrative: EXAMINATION:  XR FACIAL BONES 3 OR MORE VIEW    CLINICAL HISTORY:  Jaw pain    FINDINGS:  Four views of the facial bones show no acute displaced fracture or destructive osseous lesion.  Subtle asymmetric opacification of left maxillary sinus is evident.  Other paranasal sinuses and mastoids appear clear.  Soft tissues are unremarkable.  Impression: 1. No abnormality to explain reported jaw pain.  2. Asymmetric left maxillary sinus opacification could indicate acute or chronic sinus disease.    Electronically signed by: Jorge Nina  Date:    04/04/2025  Time:    09:29         **Labs and x-rays personally reviewed by me    ** reviewed           Assessment & Plan:   Assessment & Plan    E66.01 Severe obesity (BMI 35.0-39.9) with comorbidity  F31.9 Bipolar disorder, unspecified    IMPRESSION:  - Cough improved but hacky cough persists.  - Evaluated request for weight loss medication and started phentermine (Adipex) for weight loss.  - Noted jaw pain, possibly related to teeth clenching.  - Considered chronic sinus disease as a factor in symptoms.  - Will prescribe and started liquid cough medicine (without codeine).    ** DIAGNOSES NOT IN VISIT DX OR FOR REVIEW **  E66.01 SEVERE OBESITY (BMI 35.0-39.9) WITH COMORBIDITY:  - Initiated phentermine (Adipex) for weight loss management in response to patient's request and continued weight gain despite self-management attempts.  - Scheduled a follow-up visit in 1 month to assess medication effectiveness.    F31.9 BIPOLAR DISORDER, UNSPECIFIED:  - Patient reports jaw  pain, possibly due to teeth clenching from experiencing pain throughout the day.  - Confirmed ongoing treatment under the care of pain management.           1. Sinus disease  -     Ambulatory referral/consult to ENT; Future; Expected date: 04/24/2025    2. Class 2 obesity with body mass index (BMI) of 37.0 to 37.9 in adult, unspecified obesity type, unspecified whether serious comorbidity present  -     phentermine (ADIPEX-P) 37.5 mg tablet; Take 1 tablet (37.5 mg total) by mouth once daily.  Dispense: 30 tablet; Refill: 0    3. Cough, unspecified type  -     promethazine-dextromethorphan (PROMETHAZINE-DM) 6.25-15 mg/5 mL Syrp; Take 7.5 mLs by mouth 3 (three) times daily as needed (for cough).  Dispense: 500 mL; Refill: 0            Juno Brady MD  This note was generated with the assistance of ambient listening technology. Verbal consent was obtained by the patient and accompanying visitor(s) for the recording of patient appointment to facilitate this note. I attest to having reviewed and edited the generated note for accuracy, though some syntax or spelling errors may persist. Please contact the author of this note for any clarification.            [1]   Current Outpatient Medications:     ALPRAZolam (XANAX) 0.25 MG tablet, Take 0.25 mg by mouth., Disp: , Rfl:     baclofen (LIORESAL) 10 MG tablet, Take 1 tablet (10 mg total) by mouth 3 (three) times daily., Disp: 180 tablet, Rfl: 1    benzonatate (TESSALON) 200 MG capsule, Take 1 capsule (200 mg total) by mouth 2 (two) times a day., Disp: 30 capsule, Rfl: 0    cetirizine (ZYRTEC) 10 MG tablet, Take 1 tablet (10 mg total) by mouth once daily., Disp: 90 tablet, Rfl: 1    citalopram (CELEXA) 10 MG tablet, Take 1 tablet (10 mg total) by mouth once daily., Disp: 30 tablet, Rfl: 1    clindamycin (CLEOCIN T) 1 % lotion, Apply topically., Disp: , Rfl:     dicyclomine (BENTYL) 10 MG capsule, Take 1 capsule (10 mg total) by mouth 3 (three) times daily as needed  (abdominal pain)., Disp: 120 capsule, Rfl: 3    docusate sodium (COLACE) 100 MG capsule, Take 1 capsule (100 mg total) by mouth 2 (two) times daily., Disp: 60 capsule, Rfl: 6    fluticasone propionate (FLONASE) 50 mcg/actuation nasal spray, 2 sprays (100 mcg total) by Each Nostril route daily as needed for Rhinitis., Disp: 16 g, Rfl: 3    fluticasone propionate (FLONASE) 50 mcg/actuation nasal spray, 1 spray (50 mcg total) by Each Nostril route once daily., Disp: 16 g, Rfl: 1    furosemide (LASIX) 20 MG tablet, Take 1 tablet (20 mg total) by mouth once daily., Disp: 90 tablet, Rfl: 1    gabapentin (NEURONTIN) 300 MG capsule, 1 capsule before bedtime Orally Twice a day, Disp: , Rfl:     guaiFENesin (MUCINEX) 600 mg 12 hr tablet, Take 1 tablet (600 mg total) by mouth 2 (two) times daily., Disp: 20 tablet, Rfl: 0    hydrOXYzine (ATARAX) 50 MG tablet, 1 tablet as needed Orally every 6 hrs, Disp: , Rfl:     lactulose (CHRONULAC) 10 gram/15 mL solution, Take 15 mLs (10 g total) by mouth 2 (two) times daily as needed (constipation)., Disp: 237 mL, Rfl: 0    lamoTRIgine (LAMICTAL) 200 MG tablet, , Disp: , Rfl:     lubiprostone (AMITIZA) 24 MCG Cap, Take 1 capsule (24 mcg total) by mouth 2 (two) times daily., Disp: 180 capsule, Rfl: 3    meloxicam (MOBIC) 15 MG tablet, Take 1 tablet (15 mg total) by mouth once daily., Disp: 90 tablet, Rfl: 1    methylPREDNISolone (MEDROL DOSEPACK) 4 mg tablet, use as directed, Disp: 21 each, Rfl: 0    mirtazapine (REMERON) 15 MG tablet, Take 1 tablet (15 mg total) by mouth every evening., Disp: 90 tablet, Rfl: 1    mupirocin (BACTROBAN) 2 % ointment, Apply topically 2 (two) times daily. Apply to affected area., Disp: 1 g, Rfl: 3    naproxen (NAPROSYN) 500 MG tablet, Take 1 tablet (500 mg total) by mouth 2 (two) times daily as needed (for pain)., Disp: 30 tablet, Rfl: 1    omeprazole (PRILOSEC) 40 MG capsule, Take 1 capsule (40 mg total) by mouth 2 (two) times daily before meals., Disp: 180  capsule, Rfl: 3    oxycodone-acetaminophen  mg (PERCOCET)  mg per tablet, Take 1 tablet by mouth daily as needed., Disp: , Rfl:     potassium chloride (KLOR-CON) 8 MEQ TbSR, Take 1 tablet (8 mEq total) by mouth once daily., Disp: 90 tablet, Rfl: 1    prazosin (MINIPRESS) 1 MG Cap, , Disp: , Rfl:     pregabalin (LYRICA) 150 MG capsule, Take 2 capsules (300 mg total) by mouth once daily., Disp: 30 capsule, Rfl: 1    promethazine (PHENERGAN) 25 MG tablet, Take 1 tablet (25 mg total) by mouth every 8 (eight) hours as needed for Nausea., Disp: 30 tablet, Rfl: 1    propranoloL (INDERAL) 20 MG tablet, , Disp: , Rfl:     topiramate (TOPAMAX) 100 MG tablet, Take 1 tablet by mouth 2 (two) times a day., Disp: , Rfl:     ziprasidone (GEODON) 60 MG Cap, Take 1 capsule (60 mg total) by mouth once daily., Disp: 90 capsule, Rfl: 1    phentermine (ADIPEX-P) 37.5 mg tablet, Take 1 tablet (37.5 mg total) by mouth once daily., Disp: 30 tablet, Rfl: 0    promethazine-dextromethorphan (PROMETHAZINE-DM) 6.25-15 mg/5 mL Syrp, Take 7.5 mLs by mouth 3 (three) times daily as needed (for cough)., Disp: 500 mL, Rfl: 0

## 2025-05-05 DIAGNOSIS — M62.838 MUSCLE SPASM: ICD-10-CM

## 2025-05-05 RX ORDER — BACLOFEN 10 MG/1
10 TABLET ORAL 3 TIMES DAILY
Qty: 180 TABLET | Refills: 1 | Status: SHIPPED | OUTPATIENT
Start: 2025-05-05

## 2025-05-15 ENCOUNTER — OFFICE VISIT (OUTPATIENT)
Dept: FAMILY MEDICINE | Facility: CLINIC | Age: 55
End: 2025-05-15
Payer: MEDICARE

## 2025-05-15 VITALS
DIASTOLIC BLOOD PRESSURE: 83 MMHG | BODY MASS INDEX: 38.06 KG/M2 | HEART RATE: 101 BPM | SYSTOLIC BLOOD PRESSURE: 128 MMHG | HEIGHT: 69 IN | OXYGEN SATURATION: 96 % | RESPIRATION RATE: 18 BRPM | WEIGHT: 257 LBS | TEMPERATURE: 97 F

## 2025-05-15 DIAGNOSIS — E66.812 CLASS 2 OBESITY WITH BODY MASS INDEX (BMI) OF 37.0 TO 37.9 IN ADULT, UNSPECIFIED OBESITY TYPE, UNSPECIFIED WHETHER SERIOUS COMORBIDITY PRESENT: ICD-10-CM

## 2025-05-15 DIAGNOSIS — R05.3 CHRONIC COUGH: Primary | ICD-10-CM

## 2025-05-15 PROCEDURE — 99213 OFFICE O/P EST LOW 20 MIN: CPT | Mod: ,,, | Performed by: INTERNAL MEDICINE

## 2025-05-15 RX ORDER — PHENTERMINE HYDROCHLORIDE 37.5 MG/1
37.5 TABLET ORAL DAILY
Qty: 30 TABLET | Refills: 0 | Status: SHIPPED | OUTPATIENT
Start: 2025-05-15

## 2025-05-15 RX ORDER — GUAIFENESIN 600 MG/1
600 TABLET, EXTENDED RELEASE ORAL 2 TIMES DAILY
Qty: 30 TABLET | Refills: 3 | Status: SHIPPED | OUTPATIENT
Start: 2025-05-15

## 2025-05-15 NOTE — PROGRESS NOTES
"Subjective:       Patient ID: Shayy Hunter is a 54 y.o. female.    Chief Complaint: Medication Refill and Health Maintenance (Cervical Cancer Screening Never done/Foot Exam Never done/Diabetic Eye Exam Never done/HIV Screening Never done/Mammogram Never done/Low Dose Statin Never done/COVID-19 Vaccine(3 - 2024-25 season) due on 09/01/2024/Lipid Panel due on 05/08/2025 )    History of Present Illness    CHIEF COMPLAINT:  Patient presents today for persistent cough    CURRENT SYMPTOMS:  She reports coughing up a small amount of phlegm and experiencing severe headache described as "intense".    MEDICAL HISTORY:  She has a history of acid reflux.    MEDICATIONS:  She takes mirtazapine twice daily and cetirizine. She reports an allergy or contraindication to codeine.         Current Medications:  Current Medications[1]           ROS  Twelve point system reviewed, unremarkable except for stated above in HPI.        Objective:         Vitals:    05/15/25 1346   BP: 128/83   BP Location: Left arm   Patient Position: Sitting   Pulse: 101   Resp: 18   Temp: 97.4 °F (36.3 °C)   TempSrc: Temporal   SpO2: 96%   Weight: 116.6 kg (257 lb)   Height: 5' 9" (1.753 m)        Physical Exam     Patient is awake alert oriented person place and  Lungs are clear to auscultation bilaterally no crackles or wheezes   Cardiovascular S1-S2 regular rate and rhythm no murmurs rubs or gallops   Abdomen is soft positive bowel sounds nontender, extremities no clubbing cyanosis edema  Neuro no focal neurological deficits  Skin warm and dry.     Last Labs:     No visits with results within 1 Month(s) from this visit.   Latest known visit with results is:   Office Visit on 04/02/2025   Component Date Value    Molecular Strep A, POC 04/02/2025 Negative      Acceptab* 04/02/2025 Yes     POC Rapid COVID 04/02/2025 Negative      Acceptab* 04/02/2025 Yes     POC Molecular Influenza * 04/02/2025 Negative     POC Molecular " Influenza * 04/02/2025 Negative      Acceptab* 04/02/2025 Yes        Last Imaging:  X-Ray Facial Bones  3 Or More View  Narrative: EXAMINATION:  XR FACIAL BONES 3 OR MORE VIEW    CLINICAL HISTORY:  Jaw pain    FINDINGS:  Four views of the facial bones show no acute displaced fracture or destructive osseous lesion.  Subtle asymmetric opacification of left maxillary sinus is evident.  Other paranasal sinuses and mastoids appear clear.  Soft tissues are unremarkable.  Impression: 1. No abnormality to explain reported jaw pain.  2. Asymmetric left maxillary sinus opacification could indicate acute or chronic sinus disease.    Electronically signed by: Jorge Nina  Date:    04/04/2025  Time:    09:29         **Labs and x-rays personally reviewed by me    ** reviewed           Assessment & Plan:   Assessment & Plan    E66.01 Severe obesity (BMI 35.0-39.9) with comorbidity    IMPRESSION:  - Considered alternative cough medications due to insurance coverage issues with previously prescribed Jax DM.  - Assessed acid reflux and current medication regimen.  - Evaluated chronic cough, considering both upper and lower respiratory causes.    ** DIAGNOSES NOT IN VISIT DX OR FOR REVIEW **  E66.01 SEVERE OBESITY (BMI 35.0-39.9) WITH COMORBIDITY:  - Noted patient's weight loss of 9-10 lbs, indicating improvement in obesity management.  - Continued weight loss medication based on positive response and provided refill.  - Instructed patient to contact office or pharmacist when 3-4 pills remain for next refill.  - Follow up scheduled in 2 months.           1. Chronic cough  -     Ambulatory referral/consult to Pulmonology; Future; Expected date: 05/22/2025  -     guaiFENesin (MUCINEX) 600 mg 12 hr tablet; Take 1 tablet (600 mg total) by mouth 2 (two) times daily.  Dispense: 30 tablet; Refill: 3    2. Class 2 obesity with body mass index (BMI) of 37.0 to 37.9 in adult, unspecified obesity type, unspecified whether  serious comorbidity present  -     phentermine (ADIPEX-P) 37.5 mg tablet; Take 1 tablet (37.5 mg total) by mouth once daily.  Dispense: 30 tablet; Refill: 0            Juno Brady MD  This note was generated with the assistance of ambient listening technology. Verbal consent was obtained by the patient and accompanying visitor(s) for the recording of patient appointment to facilitate this note. I attest to having reviewed and edited the generated note for accuracy, though some syntax or spelling errors may persist. Please contact the author of this note for any clarification.            [1]   Current Outpatient Medications:     ALPRAZolam (XANAX) 0.25 MG tablet, Take 0.25 mg by mouth., Disp: , Rfl:     baclofen (LIORESAL) 10 MG tablet, Take 1 tablet (10 mg total) by mouth 3 (three) times daily., Disp: 180 tablet, Rfl: 1    benzonatate (TESSALON) 200 MG capsule, Take 1 capsule (200 mg total) by mouth 2 (two) times a day., Disp: 30 capsule, Rfl: 0    cetirizine (ZYRTEC) 10 MG tablet, Take 1 tablet (10 mg total) by mouth once daily., Disp: 90 tablet, Rfl: 1    citalopram (CELEXA) 10 MG tablet, Take 1 tablet (10 mg total) by mouth once daily., Disp: 30 tablet, Rfl: 1    clindamycin (CLEOCIN T) 1 % lotion, Apply topically., Disp: , Rfl:     dicyclomine (BENTYL) 10 MG capsule, Take 1 capsule (10 mg total) by mouth 3 (three) times daily as needed (abdominal pain)., Disp: 120 capsule, Rfl: 3    docusate sodium (COLACE) 100 MG capsule, Take 1 capsule (100 mg total) by mouth 2 (two) times daily., Disp: 60 capsule, Rfl: 6    fluticasone propionate (FLONASE) 50 mcg/actuation nasal spray, 2 sprays (100 mcg total) by Each Nostril route daily as needed for Rhinitis., Disp: 16 g, Rfl: 3    fluticasone propionate (FLONASE) 50 mcg/actuation nasal spray, 1 spray (50 mcg total) by Each Nostril route once daily., Disp: 16 g, Rfl: 1    furosemide (LASIX) 20 MG tablet, Take 1 tablet (20 mg total) by mouth once daily., Disp: 90  tablet, Rfl: 1    gabapentin (NEURONTIN) 300 MG capsule, 1 capsule before bedtime Orally Twice a day, Disp: , Rfl:     hydrOXYzine (ATARAX) 50 MG tablet, 1 tablet as needed Orally every 6 hrs, Disp: , Rfl:     lactulose (CHRONULAC) 10 gram/15 mL solution, Take 15 mLs (10 g total) by mouth 2 (two) times daily as needed (constipation)., Disp: 237 mL, Rfl: 0    lamoTRIgine (LAMICTAL) 200 MG tablet, , Disp: , Rfl:     lubiprostone (AMITIZA) 24 MCG Cap, Take 1 capsule (24 mcg total) by mouth 2 (two) times daily., Disp: 180 capsule, Rfl: 3    meloxicam (MOBIC) 15 MG tablet, Take 1 tablet (15 mg total) by mouth once daily., Disp: 90 tablet, Rfl: 1    mirtazapine (REMERON) 15 MG tablet, Take 1 tablet (15 mg total) by mouth every evening., Disp: 90 tablet, Rfl: 1    mupirocin (BACTROBAN) 2 % ointment, Apply topically 2 (two) times daily. Apply to affected area., Disp: 1 g, Rfl: 3    naproxen (NAPROSYN) 500 MG tablet, Take 1 tablet (500 mg total) by mouth 2 (two) times daily as needed (for pain)., Disp: 30 tablet, Rfl: 1    omeprazole (PRILOSEC) 40 MG capsule, Take 1 capsule (40 mg total) by mouth 2 (two) times daily before meals., Disp: 180 capsule, Rfl: 3    oxycodone-acetaminophen  mg (PERCOCET)  mg per tablet, Take 1 tablet by mouth daily as needed., Disp: , Rfl:     potassium chloride (KLOR-CON) 8 MEQ TbSR, Take 1 tablet (8 mEq total) by mouth once daily., Disp: 90 tablet, Rfl: 1    prazosin (MINIPRESS) 1 MG Cap, , Disp: , Rfl:     pregabalin (LYRICA) 150 MG capsule, Take 2 capsules (300 mg total) by mouth once daily., Disp: 30 capsule, Rfl: 1    promethazine (PHENERGAN) 25 MG tablet, Take 1 tablet (25 mg total) by mouth every 8 (eight) hours as needed for Nausea., Disp: 30 tablet, Rfl: 1    promethazine-dextromethorphan (PROMETHAZINE-DM) 6.25-15 mg/5 mL Syrp, Take 7.5 mLs by mouth 3 (three) times daily as needed (for cough)., Disp: 500 mL, Rfl: 0    propranoloL (INDERAL) 20 MG tablet, , Disp: , Rfl:      topiramate (TOPAMAX) 100 MG tablet, Take 1 tablet by mouth 2 (two) times a day., Disp: , Rfl:     ziprasidone (GEODON) 60 MG Cap, Take 1 capsule (60 mg total) by mouth once daily., Disp: 90 capsule, Rfl: 1    guaiFENesin (MUCINEX) 600 mg 12 hr tablet, Take 1 tablet (600 mg total) by mouth 2 (two) times daily., Disp: 30 tablet, Rfl: 3    phentermine (ADIPEX-P) 37.5 mg tablet, Take 1 tablet (37.5 mg total) by mouth once daily., Disp: 30 tablet, Rfl: 0

## 2025-05-29 ENCOUNTER — TELEPHONE (OUTPATIENT)
Dept: PULMONOLOGY | Facility: CLINIC | Age: 55
End: 2025-05-29
Payer: MEDICARE

## 2025-06-03 DIAGNOSIS — R11.0 NAUSEA: Chronic | ICD-10-CM

## 2025-06-03 DIAGNOSIS — E66.812 CLASS 2 OBESITY WITH BODY MASS INDEX (BMI) OF 37.0 TO 37.9 IN ADULT, UNSPECIFIED OBESITY TYPE, UNSPECIFIED WHETHER SERIOUS COMORBIDITY PRESENT: ICD-10-CM

## 2025-06-04 RX ORDER — MUPIROCIN 20 MG/G
OINTMENT TOPICAL 2 TIMES DAILY
Qty: 1 G | Refills: 3 | Status: SHIPPED | OUTPATIENT
Start: 2025-06-04

## 2025-06-04 RX ORDER — PHENTERMINE HYDROCHLORIDE 37.5 MG/1
37.5 TABLET ORAL DAILY
Qty: 30 TABLET | Refills: 0 | Status: SHIPPED | OUTPATIENT
Start: 2025-06-04

## 2025-06-04 RX ORDER — PROMETHAZINE HYDROCHLORIDE 25 MG/1
25 TABLET ORAL EVERY 8 HOURS PRN
Qty: 30 TABLET | Refills: 1 | Status: SHIPPED | OUTPATIENT
Start: 2025-06-04

## 2025-07-01 DIAGNOSIS — E66.812 CLASS 2 OBESITY WITH BODY MASS INDEX (BMI) OF 37.0 TO 37.9 IN ADULT, UNSPECIFIED OBESITY TYPE, UNSPECIFIED WHETHER SERIOUS COMORBIDITY PRESENT: ICD-10-CM

## 2025-07-01 DIAGNOSIS — E87.6 HYPOKALEMIA: ICD-10-CM

## 2025-07-07 RX ORDER — MELOXICAM 15 MG/1
15 TABLET ORAL DAILY
Qty: 90 TABLET | Refills: 1 | Status: SHIPPED | OUTPATIENT
Start: 2025-07-07

## 2025-07-07 RX ORDER — POTASSIUM CHLORIDE 600 MG/1
8 TABLET, EXTENDED RELEASE ORAL DAILY
Qty: 90 TABLET | Refills: 1 | Status: SHIPPED | OUTPATIENT
Start: 2025-07-07

## 2025-07-07 RX ORDER — PHENTERMINE HYDROCHLORIDE 37.5 MG/1
37.5 TABLET ORAL DAILY
Qty: 30 TABLET | Refills: 0 | Status: SHIPPED | OUTPATIENT
Start: 2025-07-07

## 2025-07-31 DIAGNOSIS — R11.0 NAUSEA: Chronic | ICD-10-CM

## 2025-07-31 DIAGNOSIS — R10.84 GENERALIZED ABDOMINAL PAIN: ICD-10-CM

## 2025-07-31 DIAGNOSIS — E66.812 CLASS 2 OBESITY WITH BODY MASS INDEX (BMI) OF 37.0 TO 37.9 IN ADULT, UNSPECIFIED OBESITY TYPE, UNSPECIFIED WHETHER SERIOUS COMORBIDITY PRESENT: ICD-10-CM

## 2025-07-31 DIAGNOSIS — T78.40XD ALLERGY, SUBSEQUENT ENCOUNTER: ICD-10-CM

## 2025-07-31 RX ORDER — PROMETHAZINE HYDROCHLORIDE 25 MG/1
25 TABLET ORAL EVERY 8 HOURS PRN
Qty: 30 TABLET | Refills: 1 | Status: SHIPPED | OUTPATIENT
Start: 2025-07-31

## 2025-07-31 RX ORDER — PHENTERMINE HYDROCHLORIDE 37.5 MG/1
37.5 TABLET ORAL DAILY
Qty: 30 TABLET | Refills: 0 | Status: SHIPPED | OUTPATIENT
Start: 2025-07-31

## 2025-07-31 RX ORDER — CETIRIZINE HYDROCHLORIDE 10 MG/1
10 TABLET ORAL DAILY PRN
Qty: 90 TABLET | Refills: 1 | Status: SHIPPED | OUTPATIENT
Start: 2025-07-31

## 2025-07-31 NOTE — TELEPHONE ENCOUNTER
Copied from CRM #[id number]. Topic: Medications - Medication Refill  >> Jul 31, 2025  1:35 PM Melba wrote:  Who Called: Shayy Hunter    Refill or New Rx:Refill    dicyclomine (BENTYL) 10 MG capsule   Sig - Route: Take 1 capsule (10 mg total) by mouth 3 (three) times daily as needed (abdominal pain)  List of preferred pharmacies on file (remove unneeded): [facility] [place] [place] - [address]  [address] [facility] [place] [address]  Phone: [phone] Fax: [phone]  Ordering Provider:Tri Head FNP      Preferred Method of Contact: Phone Call  Patient's Preferred Phone Number on File: [phone]   Best Call Back Number, if different:  Additional Information:

## 2025-08-01 RX ORDER — DICYCLOMINE HYDROCHLORIDE 10 MG/1
10 CAPSULE ORAL 3 TIMES DAILY PRN
Qty: 120 CAPSULE | Refills: 3 | Status: SHIPPED | OUTPATIENT
Start: 2025-08-01

## 2025-08-05 ENCOUNTER — OFFICE VISIT (OUTPATIENT)
Dept: FAMILY MEDICINE | Facility: CLINIC | Age: 55
End: 2025-08-05
Payer: MEDICARE

## 2025-08-05 VITALS
WEIGHT: 252 LBS | OXYGEN SATURATION: 98 % | BODY MASS INDEX: 37.33 KG/M2 | HEIGHT: 69 IN | TEMPERATURE: 98 F | RESPIRATION RATE: 18 BRPM | SYSTOLIC BLOOD PRESSURE: 139 MMHG | HEART RATE: 102 BPM | DIASTOLIC BLOOD PRESSURE: 82 MMHG

## 2025-08-05 DIAGNOSIS — Z13.1 SCREENING FOR DIABETES MELLITUS: ICD-10-CM

## 2025-08-05 DIAGNOSIS — M54.50 LOW BACK PAIN, UNSPECIFIED BACK PAIN LATERALITY, UNSPECIFIED CHRONICITY, UNSPECIFIED WHETHER SCIATICA PRESENT: Primary | ICD-10-CM

## 2025-08-05 DIAGNOSIS — Z13.220 SCREENING FOR LIPID DISORDERS: ICD-10-CM

## 2025-08-05 LAB
CHOLEST SERPL-MCNC: 174 MG/DL
CHOLEST/HDLC SERPL: 3.1 {RATIO}
EST. AVERAGE GLUCOSE BLD GHB EST-MCNC: 114 MG/DL
HBA1C MFR BLD HPLC: 5.6 %
HDLC SERPL-MCNC: 56 MG/DL (ref 35–60)
LDLC SERPL CALC-MCNC: 102 MG/DL
LDLC/HDLC SERPL: 1.8 {RATIO}
NONHDLC SERPL-MCNC: 118 MG/DL
TRIGL SERPL-MCNC: 78 MG/DL (ref 37–140)
VLDLC SERPL-MCNC: 16 MG/DL

## 2025-08-05 PROCEDURE — 99214 OFFICE O/P EST MOD 30 MIN: CPT | Mod: ,,, | Performed by: INTERNAL MEDICINE

## 2025-08-05 PROCEDURE — 96372 THER/PROPH/DIAG INJ SC/IM: CPT | Mod: ,,, | Performed by: INTERNAL MEDICINE

## 2025-08-05 RX ORDER — DICLOFENAC SODIUM 10 MG/G
2 GEL TOPICAL 3 TIMES DAILY
Qty: 20 G | Refills: 3 | Status: SHIPPED | OUTPATIENT
Start: 2025-08-05

## 2025-08-05 RX ORDER — DICLOFENAC SODIUM 10 MG/G
2 GEL TOPICAL 3 TIMES DAILY
COMMUNITY
End: 2025-08-05 | Stop reason: SDUPTHER

## 2025-08-05 RX ORDER — KETOROLAC TROMETHAMINE 30 MG/ML
15 INJECTION, SOLUTION INTRAMUSCULAR; INTRAVENOUS
Status: COMPLETED | OUTPATIENT
Start: 2025-08-05 | End: 2025-08-05

## 2025-08-05 RX ADMIN — KETOROLAC TROMETHAMINE 15 MG: 30 INJECTION, SOLUTION INTRAMUSCULAR; INTRAVENOUS at 02:08

## 2025-08-05 NOTE — PROGRESS NOTES
"Subjective:       Patient ID: Shayy Hunter is a 54 y.o. female.    Chief Complaint: Back Pain, Neck Pain, and Health Maintenance (Cervical Cancer Screening Never done/Foot Exam Never done/Diabetic Eye Exam Never done/HIV Screening Never done/Low Dose Statin Never done/COVID-19 Vaccine(3 - 2024-25 season) due on 09/01/2024/Lipid Panel due on 05/08/2025/Mammogram due on 07/08/2025/Hemoglobin A1c due on 07/14/2025 )    History of Present Illness    CHIEF COMPLAINT:  Patient presents today for routine follow up with complaints of generalized pain.    PAIN MANAGEMENT:  She has a history of three spinal surgeries and is currently under care of a pain management specialist. She has a bulging disc requiring injection, about which she expresses significant anxiety due to its location near her throat. She has been diagnosed with fibromyalgia which complicates her pain management.    MUSCULOSKELETAL PAIN:  She reports widespread musculoskeletal pain involving multiple body regions including neck, back, hands, knees, and hips. She uses Voltaren gel daily and other topical treatments for pain management in these areas. She describes feeling exhausted from the generalized pain.    WEIGHT MANAGEMENT:  She reports ongoing gradual weight loss efforts with minimal progress to date.         Current Medications:  Current Medications[1]           ROS  Twelve point system reviewed, unremarkable except for stated above in HPI.        Objective:         Vitals:    08/05/25 1335   BP: 139/82   Pulse: 102   Resp: 18   Temp: 97.8 °F (36.6 °C)   TempSrc: Temporal   SpO2: 98%   Weight: 114.3 kg (252 lb)   Height: 5' 9" (1.753 m)        Physical Exam     Patient is awake alert oriented person place and  Lungs are clear to auscultation bilaterally no crackles or wheezes   Cardiovascular S1-S2 regular rate and rhythm no murmurs rubs or gallops   Abdomen is soft positive bowel sounds nontender, extremities no clubbing cyanosis edema  Neuro no " focal neurological deficits  Skin warm and dry.     Last Labs:     No visits with results within 1 Month(s) from this visit.   Latest known visit with results is:   Office Visit on 04/02/2025   Component Date Value    Molecular Strep A, POC 04/02/2025 Negative      Acceptab* 04/02/2025 Yes     POC Rapid COVID 04/02/2025 Negative      Acceptab* 04/02/2025 Yes     POC Molecular Influenza * 04/02/2025 Negative     POC Molecular Influenza * 04/02/2025 Negative      Acceptab* 04/02/2025 Yes        Last Imaging:  X-Ray Facial Bones  3 Or More View  Narrative: EXAMINATION:  XR FACIAL BONES 3 OR MORE VIEW    CLINICAL HISTORY:  Jaw pain    FINDINGS:  Four views of the facial bones show no acute displaced fracture or destructive osseous lesion.  Subtle asymmetric opacification of left maxillary sinus is evident.  Other paranasal sinuses and mastoids appear clear.  Soft tissues are unremarkable.  Impression: 1. No abnormality to explain reported jaw pain.  2. Asymmetric left maxillary sinus opacification could indicate acute or chronic sinus disease.    Electronically signed by: Jorge Nina  Date:    04/04/2025  Time:    09:29         **Labs and x-rays personally reviewed by me    ** reviewed           Assessment & Plan:   Assessment & Plan    E66.01 Severe obesity (BMI 35.0-39.9) with comorbidity    IMPRESSION:  - Assessed widespread pain, considering history of osteoarthritis, fibromyalgia, and multiple spinal surgeries.  - Evaluated need for immediate pain relief, opting for low-dose Toradol injection due to self-driving status.    ** DIAGNOSES NOT IN VISIT DX OR FOR REVIEW **  E66.01 SEVERE OBESITY (BMI 35.0-39.9) WITH COMORBIDITY:  - Noted that the patient is losing weight slowly.           1. Low back pain, unspecified back pain laterality, unspecified chronicity, unspecified whether sciatica present  Overview:  Dx updated per 2019 IMO Load    Orders:  -     ketorolac  injection 15 mg  -     diclofenac sodium (VOLTAREN ARTHRITIS PAIN) 1 % Gel; Apply 2 g topically 3 (three) times daily.  Dispense: 20 g; Refill: 3    2. Screening for diabetes mellitus  -     Hemoglobin A1C; Future; Expected date: 08/05/2025    3. Screening for lipid disorders  -     Lipid panel; Future; Expected date: 08/05/2025            Juno Brady MD  This note was generated with the assistance of ambient listening technology. Verbal consent was obtained by the patient and accompanying visitor(s) for the recording of patient appointment to facilitate this note. I attest to having reviewed and edited the generated note for accuracy, though some syntax or spelling errors may persist. Please contact the author of this note for any clarification.            [1]   Current Outpatient Medications:     ALPRAZolam (XANAX) 0.25 MG tablet, Take 0.25 mg by mouth., Disp: , Rfl:     baclofen (LIORESAL) 10 MG tablet, Take 1 tablet (10 mg total) by mouth 3 (three) times daily., Disp: 180 tablet, Rfl: 1    benzonatate (TESSALON) 200 MG capsule, Take 1 capsule (200 mg total) by mouth 2 (two) times a day., Disp: 30 capsule, Rfl: 0    cetirizine (ZYRTEC) 10 MG tablet, Take 1 tablet (10 mg total) by mouth daily as needed for Allergies., Disp: 90 tablet, Rfl: 1    citalopram (CELEXA) 10 MG tablet, Take 1 tablet (10 mg total) by mouth once daily., Disp: 30 tablet, Rfl: 1    clindamycin (CLEOCIN T) 1 % lotion, Apply topically., Disp: , Rfl:     dicyclomine (BENTYL) 10 MG capsule, Take 1 capsule (10 mg total) by mouth 3 (three) times daily as needed (abdominal pain)., Disp: 120 capsule, Rfl: 3    docusate sodium (COLACE) 100 MG capsule, Take 1 capsule (100 mg total) by mouth 2 (two) times daily., Disp: 60 capsule, Rfl: 6    fluticasone propionate (FLONASE) 50 mcg/actuation nasal spray, 2 sprays (100 mcg total) by Each Nostril route daily as needed for Rhinitis., Disp: 16 g, Rfl: 3    fluticasone propionate (FLONASE) 50  mcg/actuation nasal spray, 1 spray (50 mcg total) by Each Nostril route once daily., Disp: 16 g, Rfl: 1    furosemide (LASIX) 20 MG tablet, Take 1 tablet (20 mg total) by mouth once daily., Disp: 90 tablet, Rfl: 1    gabapentin (NEURONTIN) 300 MG capsule, 1 capsule before bedtime Orally Twice a day, Disp: , Rfl:     guaiFENesin (MUCINEX) 600 mg 12 hr tablet, Take 1 tablet (600 mg total) by mouth 2 (two) times daily., Disp: 30 tablet, Rfl: 3    hydrOXYzine (ATARAX) 50 MG tablet, 1 tablet as needed Orally every 6 hrs, Disp: , Rfl:     lactulose (CHRONULAC) 10 gram/15 mL solution, Take 15 mLs (10 g total) by mouth 2 (two) times daily as needed (constipation)., Disp: 237 mL, Rfl: 0    lamoTRIgine (LAMICTAL) 200 MG tablet, , Disp: , Rfl:     lubiprostone (AMITIZA) 24 MCG Cap, Take 1 capsule (24 mcg total) by mouth 2 (two) times daily., Disp: 180 capsule, Rfl: 3    meloxicam (MOBIC) 15 MG tablet, Take 1 tablet (15 mg total) by mouth once daily., Disp: 90 tablet, Rfl: 1    mirtazapine (REMERON) 15 MG tablet, Take 1 tablet (15 mg total) by mouth every evening., Disp: 90 tablet, Rfl: 1    mupirocin (BACTROBAN) 2 % ointment, Apply topically 2 (two) times daily. Apply to affected area., Disp: 1 g, Rfl: 3    naproxen (NAPROSYN) 500 MG tablet, Take 1 tablet (500 mg total) by mouth 2 (two) times daily as needed (for pain)., Disp: 30 tablet, Rfl: 1    omeprazole (PRILOSEC) 40 MG capsule, Take 1 capsule (40 mg total) by mouth 2 (two) times daily before meals., Disp: 180 capsule, Rfl: 3    oxycodone-acetaminophen  mg (PERCOCET)  mg per tablet, Take 1 tablet by mouth daily as needed., Disp: , Rfl:     phentermine (ADIPEX-P) 37.5 mg tablet, Take 1 tablet (37.5 mg total) by mouth once daily., Disp: 30 tablet, Rfl: 0    potassium chloride (KLOR-CON) 8 MEQ TbSR, Take 1 tablet (8 mEq total) by mouth once daily., Disp: 90 tablet, Rfl: 1    prazosin (MINIPRESS) 1 MG Cap, , Disp: , Rfl:     pregabalin (LYRICA) 150 MG capsule,  Take 2 capsules (300 mg total) by mouth once daily., Disp: 30 capsule, Rfl: 1    promethazine (PHENERGAN) 25 MG tablet, Take 1 tablet (25 mg total) by mouth every 8 (eight) hours as needed for Nausea., Disp: 30 tablet, Rfl: 1    promethazine-dextromethorphan (PROMETHAZINE-DM) 6.25-15 mg/5 mL Syrp, Take 7.5 mLs by mouth 3 (three) times daily as needed (for cough)., Disp: 500 mL, Rfl: 0    propranoloL (INDERAL) 20 MG tablet, , Disp: , Rfl:     topiramate (TOPAMAX) 100 MG tablet, Take 1 tablet by mouth 2 (two) times a day., Disp: , Rfl:     ziprasidone (GEODON) 60 MG Cap, Take 1 capsule (60 mg total) by mouth once daily., Disp: 90 capsule, Rfl: 1    diclofenac sodium (VOLTAREN ARTHRITIS PAIN) 1 % Gel, Apply 2 g topically 3 (three) times daily., Disp: 20 g, Rfl: 3  No current facility-administered medications for this visit.

## 2025-08-25 ENCOUNTER — PATIENT MESSAGE (OUTPATIENT)
Facility: HOSPITAL | Age: 55
End: 2025-08-25
Payer: MEDICARE

## 2025-09-02 DIAGNOSIS — M62.838 MUSCLE SPASM: ICD-10-CM

## 2025-09-02 DIAGNOSIS — E66.812 CLASS 2 OBESITY WITH BODY MASS INDEX (BMI) OF 37.0 TO 37.9 IN ADULT, UNSPECIFIED OBESITY TYPE, UNSPECIFIED WHETHER SERIOUS COMORBIDITY PRESENT: ICD-10-CM

## 2025-09-03 RX ORDER — PHENTERMINE HYDROCHLORIDE 37.5 MG/1
37.5 TABLET ORAL DAILY
Qty: 30 TABLET | Refills: 0 | Status: SHIPPED | OUTPATIENT
Start: 2025-09-03

## 2025-09-03 RX ORDER — BACLOFEN 10 MG/1
10 TABLET ORAL 3 TIMES DAILY
Qty: 180 TABLET | Refills: 1 | Status: SHIPPED | OUTPATIENT
Start: 2025-09-03